# Patient Record
Sex: FEMALE | Race: BLACK OR AFRICAN AMERICAN | Employment: UNEMPLOYED | URBAN - METROPOLITAN AREA
[De-identification: names, ages, dates, MRNs, and addresses within clinical notes are randomized per-mention and may not be internally consistent; named-entity substitution may affect disease eponyms.]

---

## 2017-04-12 ENCOUNTER — HOSPITAL ENCOUNTER (EMERGENCY)
Age: 52
Discharge: HOME | End: 2017-04-12
Attending: EMERGENCY MEDICINE
Payer: SELF-PAY

## 2017-04-12 ENCOUNTER — APPOINTMENT (OUTPATIENT)
Dept: ULTRASOUND IMAGING | Age: 52
End: 2017-04-12
Attending: PHYSICIAN ASSISTANT
Payer: SELF-PAY

## 2017-04-12 VITALS
DIASTOLIC BLOOD PRESSURE: 95 MMHG | HEART RATE: 83 BPM | WEIGHT: 126 LBS | BODY MASS INDEX: 21.51 KG/M2 | RESPIRATION RATE: 20 BRPM | SYSTOLIC BLOOD PRESSURE: 120 MMHG | HEIGHT: 64 IN | OXYGEN SATURATION: 100 % | TEMPERATURE: 98.1 F

## 2017-04-12 DIAGNOSIS — N83.202 LEFT OVARIAN CYST: ICD-10-CM

## 2017-04-12 DIAGNOSIS — N39.0 URINARY TRACT INFECTION, ACUTE: Primary | ICD-10-CM

## 2017-04-12 LAB
APPEARANCE UR: ABNORMAL
BACTERIA URNS QL MICRO: ABNORMAL /HPF
BILIRUB UR QL: NEGATIVE
COLOR UR: YELLOW
EPITH CASTS URNS QL MICRO: ABNORMAL /LPF (ref 0–5)
GLUCOSE UR STRIP.AUTO-MCNC: NEGATIVE MG/DL
HCG UR QL: NEGATIVE
HGB UR QL STRIP: NEGATIVE
KETONES UR QL STRIP.AUTO: NEGATIVE MG/DL
LEUKOCYTE ESTERASE UR QL STRIP.AUTO: ABNORMAL
MUCOUS THREADS URNS QL MICRO: ABNORMAL /LPF
NITRITE UR QL STRIP.AUTO: NEGATIVE
PH UR STRIP: 6.5 [PH] (ref 5–8)
PROT UR STRIP-MCNC: NEGATIVE MG/DL
RBC #/AREA URNS HPF: ABNORMAL /HPF (ref 0–5)
SP GR UR REFRACTOMETRY: 1.03 (ref 1–1.03)
UROBILINOGEN UR QL STRIP.AUTO: 1 EU/DL (ref 0.2–1)
WBC URNS QL MICRO: ABNORMAL /HPF (ref 0–4)

## 2017-04-12 PROCEDURE — 76830 TRANSVAGINAL US NON-OB: CPT

## 2017-04-12 PROCEDURE — 81025 URINE PREGNANCY TEST: CPT | Performed by: EMERGENCY MEDICINE

## 2017-04-12 PROCEDURE — 99283 EMERGENCY DEPT VISIT LOW MDM: CPT

## 2017-04-12 PROCEDURE — 81001 URINALYSIS AUTO W/SCOPE: CPT | Performed by: EMERGENCY MEDICINE

## 2017-04-12 RX ORDER — NAPROXEN 375 MG/1
375 TABLET ORAL 2 TIMES DAILY WITH MEALS
Qty: 20 TAB | Refills: 0 | Status: SHIPPED | OUTPATIENT
Start: 2017-04-12 | End: 2017-10-26

## 2017-04-12 RX ORDER — CEPHALEXIN 500 MG/1
500 CAPSULE ORAL 2 TIMES DAILY
Qty: 14 CAP | Refills: 0 | Status: SHIPPED | OUTPATIENT
Start: 2017-04-12 | End: 2017-04-19

## 2017-04-12 NOTE — DISCHARGE INSTRUCTIONS
Urinary Tract Infection in Women: Care Instructions  Your Care Instructions    A urinary tract infection, or UTI, is a general term for an infection anywhere between the kidneys and the urethra (where urine comes out). Most UTIs are bladder infections. They often cause pain or burning when you urinate. UTIs are caused by bacteria and can be cured with antibiotics. Be sure to complete your treatment so that the infection goes away. Follow-up care is a key part of your treatment and safety. Be sure to make and go to all appointments, and call your doctor if you are having problems. It's also a good idea to know your test results and keep a list of the medicines you take. How can you care for yourself at home? · Take your antibiotics as directed. Do not stop taking them just because you feel better. You need to take the full course of antibiotics. · Drink extra water and other fluids for the next day or two. This may help wash out the bacteria that are causing the infection. (If you have kidney, heart, or liver disease and have to limit fluids, talk with your doctor before you increase your fluid intake.)  · Avoid drinks that are carbonated or have caffeine. They can irritate the bladder. · Urinate often. Try to empty your bladder each time. · To relieve pain, take a hot bath or lay a heating pad set on low over your lower belly or genital area. Never go to sleep with a heating pad in place. To prevent UTIs  · Drink plenty of water each day. This helps you urinate often, which clears bacteria from your system. (If you have kidney, heart, or liver disease and have to limit fluids, talk with your doctor before you increase your fluid intake.)  · Urinate when you need to. · Urinate right after you have sex. · Change sanitary pads often. · Avoid douches, bubble baths, feminine hygiene sprays, and other feminine hygiene products that have deodorants.   · After going to the bathroom, wipe from front to back.  When should you call for help? Call your doctor now or seek immediate medical care if:  · Symptoms such as fever, chills, nausea, or vomiting get worse or appear for the first time. · You have new pain in your back just below your rib cage. This is called flank pain. · There is new blood or pus in your urine. · You have any problems with your antibiotic medicine. Watch closely for changes in your health, and be sure to contact your doctor if:  · You are not getting better after taking an antibiotic for 2 days. · Your symptoms go away but then come back. Where can you learn more? Go to http://allie-francine.info/. Enter O982 in the search box to learn more about \"Urinary Tract Infection in Women: Care Instructions. \"  Current as of: November 28, 2016  Content Version: 11.2  © 7887-2362 [employer]. Care instructions adapted under license by [employer] (which disclaims liability or warranty for this information). If you have questions about a medical condition or this instruction, always ask your healthcare professional. [address] any warranty or liability for your use of this information.

## 2017-04-12 NOTE — ED PROVIDER NOTES
HPI Comments: 49yoF to ED c/o pelvic pain. Pt states pain similar to previous pain when she was dx with ovarian cyst. Reports some urinary frequency. She is still having normal periods. Denies dysuria, hematuria, vaginal discharge, fever, chills, or any other symptoms or concerns. Patient is a 46 y.o. female presenting with pelvic pain. The history is provided by the patient. Pelvic Pain          No past medical history on file. No past surgical history on file. No family history on file. Social History     Social History    Marital status: SINGLE     Spouse name: N/A    Number of children: N/A    Years of education: N/A     Occupational History    Not on file. Social History Main Topics    Smoking status: Never Smoker    Smokeless tobacco: Not on file    Alcohol use Yes      Comment: occational beer    Drug use: No    Sexual activity: Not on file     Other Topics Concern    Not on file     Social History Narrative         ALLERGIES: Review of patient's allergies indicates no known allergies. Review of Systems   Genitourinary: Positive for pelvic pain. All other systems reviewed and are negative. Vitals:    04/12/17 1112   BP: (!) 120/95   Pulse: 83   Resp: 20   Temp: 98.1 °F (36.7 °C)   SpO2: 100%   Weight: 57.2 kg (126 lb)   Height: 5' 4\" (1.626 m)            Physical Exam   Constitutional: She appears well-developed and well-nourished. No distress. HENT:   Head: Normocephalic and atraumatic. Eyes: Conjunctivae are normal.   Neck: Normal range of motion. Neck supple. Cardiovascular: Normal rate and regular rhythm. Pulmonary/Chest: Effort normal and breath sounds normal.   Abdominal: Soft. She exhibits no distension. There is no tenderness. Musculoskeletal: Normal range of motion. Neurological: She is alert. Skin: Skin is warm and dry. She is not diaphoretic. Psychiatric: She has a normal mood and affect.         MDM  Number of Diagnoses or Management Options  Left ovarian cyst:   Urinary tract infection, acute:   Diagnosis management comments: Pt presents with pelvic pain which she states is similar to pain when she was dx with ovarian cyst. She appears non toxic, VSS, afebrile. US with left ovarian cyst, no torsion. UTI noted on UA, will treat. Advised gyn f/u.  ARCHIE High 1:01 PM         Amount and/or Complexity of Data Reviewed  Clinical lab tests: reviewed and ordered  Tests in the radiology section of CPT®: ordered and reviewed    Risk of Complications, Morbidity, and/or Mortality  Presenting problems: moderate  Diagnostic procedures: moderate  Management options: low    Patient Progress  Patient progress: improved    ED Course       Procedures

## 2017-04-12 NOTE — ED NOTES
I have reviewed discharge instructions with the patient. The patient verbalized understanding. Pt given both verbal and written D/C information and 2 Rxs. Pt understands to F/U in ED for any new or worsening symptoms. Pt leaving ED now in stable condition, ambulatory, w/ no further questions or concerns at this time.

## 2017-04-12 NOTE — ED TRIAGE NOTES
Low pelvic pain returned from previous visit, did not follow up as directed.  \"they told me it was my ovaries\"

## 2017-10-24 ENCOUNTER — HOSPITAL ENCOUNTER (INPATIENT)
Age: 52
LOS: 2 days | Discharge: HOME | DRG: 282 | End: 2017-10-26
Attending: EMERGENCY MEDICINE | Admitting: HOSPITALIST
Payer: SELF-PAY

## 2017-10-24 ENCOUNTER — APPOINTMENT (OUTPATIENT)
Dept: GENERAL RADIOLOGY | Age: 52
DRG: 282 | End: 2017-10-24
Attending: EMERGENCY MEDICINE
Payer: SELF-PAY

## 2017-10-24 DIAGNOSIS — F14.10 COCAINE ABUSE (HCC): ICD-10-CM

## 2017-10-24 DIAGNOSIS — F10.10 ALCOHOL ABUSE: ICD-10-CM

## 2017-10-24 DIAGNOSIS — R77.8 ELEVATED TROPONIN: ICD-10-CM

## 2017-10-24 DIAGNOSIS — R55 SYNCOPE AND COLLAPSE: Primary | ICD-10-CM

## 2017-10-24 DIAGNOSIS — R11.0 NAUSEA WITHOUT VOMITING: ICD-10-CM

## 2017-10-24 DIAGNOSIS — R94.31 PROLONGED Q-T INTERVAL ON ECG: ICD-10-CM

## 2017-10-24 PROBLEM — I21.4 NSTEMI (NON-ST ELEVATED MYOCARDIAL INFARCTION) (HCC): Status: ACTIVE | Noted: 2017-10-24

## 2017-10-24 LAB
ALBUMIN SERPL-MCNC: 4.1 G/DL (ref 3.4–5)
ALBUMIN/GLOB SERPL: 0.9 {RATIO} (ref 0.8–1.7)
ALP SERPL-CCNC: 78 U/L (ref 45–117)
ALT SERPL-CCNC: 35 U/L (ref 13–56)
AMPHET UR QL SCN: NEGATIVE
ANION GAP SERPL CALC-SCNC: 12 MMOL/L (ref 3–18)
AST SERPL-CCNC: 57 U/L (ref 15–37)
BARBITURATES UR QL SCN: NEGATIVE
BASOPHILS # BLD: 0 K/UL (ref 0–0.1)
BASOPHILS NFR BLD: 0 % (ref 0–2)
BENZODIAZ UR QL: NEGATIVE
BILIRUB SERPL-MCNC: 0.4 MG/DL (ref 0.2–1)
BUN SERPL-MCNC: 16 MG/DL (ref 7–18)
BUN/CREAT SERPL: 24 (ref 12–20)
CALCIUM SERPL-MCNC: 8.2 MG/DL (ref 8.5–10.1)
CANNABINOIDS UR QL SCN: NEGATIVE
CHLORIDE SERPL-SCNC: 108 MMOL/L (ref 100–108)
CK MB CFR SERPL CALC: 1.9 % (ref 0–4)
CK MB SERPL-MCNC: 3.4 NG/ML (ref 5–25)
CK SERPL-CCNC: 177 U/L (ref 26–192)
CO2 SERPL-SCNC: 20 MMOL/L (ref 21–32)
COCAINE UR QL SCN: POSITIVE
CREAT SERPL-MCNC: 0.67 MG/DL (ref 0.6–1.3)
DIFFERENTIAL METHOD BLD: ABNORMAL
EOSINOPHIL # BLD: 0 K/UL (ref 0–0.4)
EOSINOPHIL NFR BLD: 0 % (ref 0–5)
ERYTHROCYTE [DISTWIDTH] IN BLOOD BY AUTOMATED COUNT: 14.1 % (ref 11.6–14.5)
ETHANOL SERPL-MCNC: 91 MG/DL (ref 0–3)
GLOBULIN SER CALC-MCNC: 4.4 G/DL (ref 2–4)
GLUCOSE SERPL-MCNC: 70 MG/DL (ref 74–99)
HCG UR QL: NEGATIVE
HCT VFR BLD AUTO: 40.1 % (ref 35–45)
HDSCOM,HDSCOM: ABNORMAL
HGB BLD-MCNC: 13.6 G/DL (ref 12–16)
LYMPHOCYTES # BLD: 0.5 K/UL (ref 0.9–3.6)
LYMPHOCYTES NFR BLD: 4 % (ref 21–52)
MCH RBC QN AUTO: 35.3 PG (ref 24–34)
MCHC RBC AUTO-ENTMCNC: 33.9 G/DL (ref 31–37)
MCV RBC AUTO: 104.2 FL (ref 74–97)
METHADONE UR QL: NEGATIVE
MONOCYTES # BLD: 0.1 K/UL (ref 0.05–1.2)
MONOCYTES NFR BLD: 1 % (ref 3–10)
NEUTS SEG # BLD: 9.6 K/UL (ref 1.8–8)
NEUTS SEG NFR BLD: 95 % (ref 40–73)
OPIATES UR QL: NEGATIVE
PCP UR QL: NEGATIVE
PLATELET # BLD AUTO: 265 K/UL (ref 135–420)
PMV BLD AUTO: 9.5 FL (ref 9.2–11.8)
POTASSIUM SERPL-SCNC: 3.8 MMOL/L (ref 3.5–5.5)
PROT SERPL-MCNC: 8.5 G/DL (ref 6.4–8.2)
RBC # BLD AUTO: 3.85 M/UL (ref 4.2–5.3)
SODIUM SERPL-SCNC: 140 MMOL/L (ref 136–145)
TROPONIN I BLD-MCNC: 0.09 NG/ML (ref 0–0.08)
TROPONIN I SERPL-MCNC: 0.24 NG/ML (ref 0–0.04)
WBC # BLD AUTO: 10.2 K/UL (ref 4.6–13.2)

## 2017-10-24 PROCEDURE — 80307 DRUG TEST PRSMV CHEM ANLYZR: CPT | Performed by: EMERGENCY MEDICINE

## 2017-10-24 PROCEDURE — 84484 ASSAY OF TROPONIN QUANT: CPT

## 2017-10-24 PROCEDURE — 93005 ELECTROCARDIOGRAM TRACING: CPT

## 2017-10-24 PROCEDURE — 81025 URINE PREGNANCY TEST: CPT | Performed by: EMERGENCY MEDICINE

## 2017-10-24 PROCEDURE — 85025 COMPLETE CBC W/AUTO DIFF WBC: CPT | Performed by: EMERGENCY MEDICINE

## 2017-10-24 PROCEDURE — 74011250636 HC RX REV CODE- 250/636: Performed by: EMERGENCY MEDICINE

## 2017-10-24 PROCEDURE — 99218 HC RM OBSERVATION: CPT

## 2017-10-24 PROCEDURE — 71010 XR CHEST PORT: CPT

## 2017-10-24 PROCEDURE — 80053 COMPREHEN METABOLIC PANEL: CPT | Performed by: EMERGENCY MEDICINE

## 2017-10-24 PROCEDURE — 99285 EMERGENCY DEPT VISIT HI MDM: CPT

## 2017-10-24 PROCEDURE — 82550 ASSAY OF CK (CPK): CPT | Performed by: EMERGENCY MEDICINE

## 2017-10-24 PROCEDURE — 96360 HYDRATION IV INFUSION INIT: CPT

## 2017-10-24 PROCEDURE — 65660000000 HC RM CCU STEPDOWN

## 2017-10-24 RX ORDER — ENOXAPARIN SODIUM 100 MG/ML
1 INJECTION SUBCUTANEOUS
Status: COMPLETED | OUTPATIENT
Start: 2017-10-24 | End: 2017-10-25

## 2017-10-24 RX ADMIN — SODIUM CHLORIDE 1000 ML: 9 INJECTION, SOLUTION INTRAVENOUS at 22:14

## 2017-10-24 NOTE — ED PROVIDER NOTES
HPI Comments: Justino Xavier   Is a 52-year-old female with no significant past medical history presents with syncope. The patient sees been drinking heavily today and resting on the couch. The significant other she has been using heroin. She proceeded to sit up from the couch and then pass out. The  then called 46. She denies any chest pain shortness of breath or abdominal pain. She does note some nausea. No sob. No other aggravating or alleviating factors. No other associated symptoms. Patient is a 46 y.o. female presenting with syncope. Syncope          No past medical history on file. No past surgical history on file. No family history on file. Social History     Social History    Marital status: SINGLE     Spouse name: N/A    Number of children: N/A    Years of education: N/A     Occupational History    Not on file. Social History Main Topics    Smoking status: Never Smoker    Smokeless tobacco: Not on file    Alcohol use Yes      Comment: occational beer    Drug use: No    Sexual activity: Not on file     Other Topics Concern    Not on file     Social History Narrative         ALLERGIES: Review of patient's allergies indicates no known allergies. Review of Systems   Cardiovascular: Positive for syncope. All other systems reviewed and are negative. There were no vitals filed for this visit. Physical Exam   Constitutional: She is oriented to person, place, and time. She appears well-developed. HENT:   Head: Normocephalic and atraumatic. Eyes: EOM are normal. Pupils are equal, round, and reactive to light. Neck: Normal range of motion. Neck supple. Cardiovascular: Normal rate, regular rhythm and normal heart sounds. Exam reveals no friction rub. No murmur heard. Pulmonary/Chest: Effort normal and breath sounds normal. No respiratory distress. She has no wheezes. Abdominal: Soft. She exhibits no distension. There is no tenderness.  There is no rebound and no guarding. Musculoskeletal: Normal range of motion. Neurological: She is alert and oriented to person, place, and time. Skin: Skin is warm and dry. Psychiatric: She has a normal mood and affect. Her behavior is normal. Thought content normal.        MDM  Number of Diagnoses or Management Options  Diagnosis management comments: 1. Syncope: likley orthostatic based on hx. No concerning sx. EKG: sinus at 51; nl axis. Nl int. No azael/d no hypertrophy. No old. .now persistnat nausea; check repeat ekg poc trop. If both neg, d/c.   cxr napd. Repeat EKG nsr at 82; nl axis. Nl int. No azael/d no hypertrophy.      ED Course       Procedures

## 2017-10-24 NOTE — Clinical Note
Patient Class[de-identified] Observation [004] Type of Bed: Telemetry [19] Reason for Observation: syncope, unresponsive, elevated troponin, prolonged QTc, cocaine abuse Admitting Diagnosis: Syncope [287170] Admitting Diagnosis: Elevated troponin [3807624] Admitting Diagnosis: QT prolongation [7858761] Admitting Diagnosis: Cocaine abuse [648225] Admitting Diagnosis: Alcohol abuse [357804] Admitting Physician: Dottie Dohrety [[id number]] Attending Physician: Dottie Doherty [[id number]]

## 2017-10-24 NOTE — IP AVS SNAPSHOT
Summary of Care Report The Summary of Care report has been created to help improve care coordination. Users with access to [facility] or [employer] (Web-based application) may access additional patient information including the Discharge Summary. If you are not currently a [facility] user and need more information, please call the number listed below in the [place] [address] section and ask to be connected with Medical Records. Facility Information Name Address Phone [address] [facility]  [address] [facility] [address] [phone] Patient Information Patient Name Sex ARIANNE Valdes ([id number]) Female 1965 Discharge Information Admitting Provider Service Area Unit Joaquin Headley MD / [address] 2s Telemetry / [phone] Discharge Provider Discharge Date/Time Discharge Disposition Destination (none) 10/26/2017 (Pending) [facility] (none) Patient Language Language ENGLISH [13] Hospital Problems as of 10/26/2017  Never Reviewed Class Noted - Resolved Last Modified POA Active Problems Alcohol abuse  10/24/2017 - Present 10/24/2017 by Sacha Sloan MD Unknown Entered by Sacha Sloan MD  
  Syncope  10/24/2017 - Present 10/24/2017 by Sacha Sloan MD Unknown Entered by Sacha Sloan MD  
  Cocaine abuse  10/24/2017 - Present 10/24/2017 by Sacha Sloan MD Unknown Entered by Sacha Sloan MD  
  Elevated troponin  10/24/2017 - Present 10/24/2017 by Sacha Sloan MD Unknown Entered by Sacha Sloan MD  
  QT prolongation  10/24/2017 - Present 10/24/2017 by Sacha Sloan MD Unknown Entered by Sacha Sloan MD  
  NSTEMI (non-ST elevated myocardial infarction) ([facility]  10/24/2017 - Present 10/24/2017 by Joaquin Headley MD Unknown   Entered by Joaquin Headley MD  
  
 You are allergic to the following No active allergies Current Discharge Medication List  
  
START taking these medications Dose & Instructions Dispensing Information Comments  
 amLODIPine 5 mg tablet Commonly known as:  Mabel Rivera Start taking on:  10/27/2017 Dose:  2.5 mg Take 0.5 Tabs by mouth daily. Indications: hypertension Quantity:  30 Tab Refills:  0  
   
 aspirin delayed-release 81 mg tablet Start taking on:  10/27/2017 Dose:  81 mg Take 1 Tab by mouth daily. Quantity:  30 Tab Refills:  0  
   
 atorvastatin 20 mg tablet Commonly known as:  LIPITOR Dose:  20 mg Take 1 Tab by mouth nightly. Quantity:  30 Tab Refills:  0  
   
 * docusate sodium 100 mg capsule Commonly known as:  Henreitta Pomeroy Dose:  100 mg Take 1 Cap by mouth two (2) times a day for 90 days. Quantity:  60 Cap Refills:  2  
   
 * docusate sodium 100 mg capsule Commonly known as:  Henreitta Pomeroy Take 1 capsule twice daily Quantity:  60 Cap Refills:  0  
   
 folic acid 1 mg tablet Commonly known as:  Malcolm Start taking on:  10/27/2017 Dose:  1 mg Take 1 Tab by mouth daily. Quantity:  30 Tab Refills:  0  
   
 potassium, sodium phosphates 280-160-250 mg packet Commonly known as:  NEUTRA-PHOS Dose:  1 Packet Take 1 Packet by mouth two (2) times a day for 5 doses. Quantity:  5 Packet Refills:  0  
   
 therapeutic multivitamin tablet Commonly known as:  [facility] Start taking on:  10/27/2017 Dose:  1 Tab Take 1 Tab by mouth daily. Quantity:  30 Tab Refills:  0  
   
 thiamine 100 mg tablet Commonly known as:  B-1 Start taking on:  10/27/2017 Dose:  100 mg Take 1 Tab by mouth daily. Quantity:  30 Tab Refills:  0  
   
 * Notice: This list has 2 medication(s) that are the same as other medications prescribed for you. Read the directions carefully, and ask your doctor or other care provider to review them with you. CONTINUE these medications which have NOT CHANGED Dose & Instructions Dispensing Information Comments  
 promethazine 25 mg tablet Commonly known as:  PHENERGAN Dose:  25 mg Take 1 Tab by mouth every six (6) hours as needed. Indications: Nausea/Vomiting Quantity:  12 Tab Refills:  0 STOP taking these medications Comments  
 naproxen 375 mg tablet Commonly known as:  NAPROSYN Current Immunizations Name Date Influenza Vaccine (Quad) PF 10/25/2017 TDAP Vaccine 9/29/2012 Follow-up Information Follow up With Details Comments Contact Info Caro Perez [facility] [id number] 
[phone] None   None (395) Patient stated that they have no PCP Discharge Instructions Acute Alcohol Intoxication: Care Instructions Your Care Instructions You have had treatment to help your body rid itself of alcohol. Too much alcohol upsets the body's fluid balance. Your doctor may have given you fluids and vitamins. For some people, drinking too much alcohol is a one-time event. For others, it is an ongoing problem. In either case, it is serious. It can be life-threatening. Follow-up care is a key part of your treatment and safety. Be sure to make and go to all appointments, and call your doctor if you are having problems. It's also a good idea to know your test results and keep a list of the medicines you take. How can you care for yourself at home? · Do not drink and drive. · Be safe with medicines. Take your medicines exactly as prescribed. Call your doctor if you think you are having a problem with your medicine. · Your doctor may have prescribed disulfiram (Antabuse). Do not drink any alcohol while you are taking this medicine. You may have severe or even life-threatening side effects from even small amounts of alcohol. · If you were given medicine to prevent nausea, be sure to take it exactly as prescribed. · Before you take any medicine, tell your doctor if: 
¨ You have had a bad reaction to any medicines in the past. 
¨ You are taking other medicines, including over-the-counter ones, or have other health problems. ¨ You are or could be pregnant. · Be prepared to have some symptoms of withdrawal in the next few days. · Drink plenty of liquids in the next few days. · Seek help if you need it to stop drinking. Getting counseling and joining a support group can help you stay sober. Try a support group such as Alcoholics Anonymous. · Avoid alcohol when you take medicines. It can react with many medicines and cause serious problems. When should you call for help? Call 911 anytime you think you may need emergency care. For example, call if: 
? · You feel confused and are seeing things that are not there. ? · You are thinking about killing yourself or hurting others. ? · You have a seizure. ? · You vomit blood or what looks like coffee grounds. ?Call your doctor now or seek immediate medical care if: 
? · You have trembling, restlessness, sweating, and other withdrawal symptoms that are new or that get worse. ? · Your withdrawal symptoms come back after not bothering you for days or weeks. ? · You can't stop vomiting. ? Watch closely for changes in your health, and be sure to contact your doctor if: 
? · You need help to stop drinking. Where can you learn more? Go to http://allie-francine.info/. Enter T102 in the search box to learn more about \"Acute Alcohol Intoxication: Care Instructions. \" Current as of: November 3, 2016 Content Version: 11.4 © 5829-6278 [employer]. Care instructions adapted under license by [employer] (which disclaims liability or warranty for this information).  If you have questions about a medical condition or this instruction, always ask your healthcare professional. [place] [address] any warranty or liability for your use of this information. DISCHARGE SUMMARY from Nurse PATIENT INSTRUCTIONS: 
 
 
F-face looks uneven A-arms unable to move or move unevenly S-speech slurred or non-existent T-time-call 911 as soon as signs and symptoms begin-DO NOT go Back to bed or wait to see if you get better-TIME IS BRAIN. Warning Signs of HEART ATTACK Call 911 if you have these symptoms: 
? Chest discomfort. Most heart attacks involve discomfort in the center of the chest that lasts more than a few minutes, or that goes away and comes back. It can feel like uncomfortable pressure, squeezing, fullness, or pain. ? Discomfort in other areas of the upper body. Symptoms can include pain or discomfort in one or both arms, the back, neck, jaw, or stomach. ? Shortness of breath with or without chest discomfort. ? Other signs may include breaking out in a cold sweat, nausea, or lightheadedness. Don't wait more than five minutes to call [address]! Fast action can save your life. Calling 911 is almost always the fastest way to get lifesaving treatment. Emergency Medical Services staff can begin treatment when they arrive  up to an hour sooner than if someone gets to the hospital by car. The discharge information has been reviewed with the patient. The patient verbalized understanding. Discharge medications reviewed with the patient and appropriate educational materials and side effects teaching were provided. ___________________________________________________________________________________________________________________________________ Chart Review Routing History No Routing History on File

## 2017-10-24 NOTE — ED NOTES
Bedside and Verbal shift change report given to Rena Brice (oncoming nurse) by Abdon Garcia RN (offgoing nurse). Report included the following information SBAR, Kardex, Intake/Output, MAR and Recent Results.

## 2017-10-24 NOTE — IP AVS SNAPSHOT
Euell Idol 
 
 
 [address] [facility] [address] [place] [address] Patient: Juan Hernandez MRN: [id number] :1965 My Medications STOP taking these medications   
 naproxen 375 mg tablet Commonly known as:  NAPROSYN  
   
  
  
TAKE these medications as instructed Instructions Each Dose to Equal  
 Morning Noon Evening Bedtime  
 amLODIPine 5 mg tablet Commonly known as:  Maxim Downing Start taking on:  10/27/2017 Your last dose was: Your next dose is: Take 0.5 Tabs by mouth daily. Indications: hypertension 2.5 mg  
    
   
   
   
  
 aspirin delayed-release 81 mg tablet Start taking on:  10/27/2017 Your last dose was: Your next dose is: Take 1 Tab by mouth daily. 81 mg  
    
   
   
   
  
 atorvastatin 20 mg tablet Commonly known as:  LIPITOR Your last dose was: Your next dose is: Take 1 Tab by mouth nightly. 20 mg  
    
   
   
   
  
 * docusate sodium 100 mg capsule Commonly known as:  Ana Lilia Agrawal Your last dose was: Your next dose is: Take 1 Cap by mouth two (2) times a day for 90 days. 100 mg  
    
   
   
   
  
 * docusate sodium 100 mg capsule Commonly known as:  Ana Lilia Agrawal Your last dose was: Your next dose is: Take 1 capsule twice daily  
     
   
   
   
  
 folic acid 1 mg tablet Commonly known as:  Malcolm Start taking on:  10/27/2017 Your last dose was: Your next dose is: Take 1 Tab by mouth daily. 1 mg  
    
   
   
   
  
 potassium, sodium phosphates 280-160-250 mg packet Commonly known as:  NEUTRA-PHOS Your last dose was: Your next dose is: Take 1 Packet by mouth two (2) times a day for 5 doses. 1 Packet  
    
   
   
   
  
 promethazine 25 mg tablet Commonly known as:  PHENERGAN Your last dose was: Your next dose is: Take 1 Tab by mouth every six (6) hours as needed. Indications: Nausea/Vomiting 25 mg  
    
   
   
   
  
 therapeutic multivitamin tablet Commonly known as:  [facility] Start taking on:  10/27/2017 Your last dose was: Your next dose is: Take 1 Tab by mouth daily. 1 Tab  
    
   
   
   
  
 thiamine 100 mg tablet Commonly known as:  B-1 Start taking on:  10/27/2017 Your last dose was: Your next dose is: Take 1 Tab by mouth daily. 100 mg * Notice: This list has 2 medication(s) that are the same as other medications prescribed for you. Read the directions carefully, and ask your doctor or other care provider to review them with you. Where to Get Your Medications Information on where to get these meds will be given to you by the nurse or doctor. ! Ask your nurse or doctor about these medications  
  amLODIPine 5 mg tablet  
 aspirin delayed-release 81 mg tablet  
 atorvastatin 20 mg tablet  
 docusate sodium 100 mg capsule  
 docusate sodium 100 mg capsule  
 folic acid 1 mg tablet  
 potassium, sodium phosphates 280-160-250 mg packet  
 therapeutic multivitamin tablet  
 thiamine 100 mg tablet

## 2017-10-24 NOTE — ED TRIAGE NOTES
BIBA, called by friend. Pt has been drinking and using drugs. Pt had been sleeping off \"gigh\" pt was sitting on side of couch and had syncopal episode. Pt denies medical hx. Only admitys to ETOH, pt is diaphoretic, pupils arent pinpoint. BS 80. Pt wasa afib . Attempted IV access. None at this time. No meds given. NKDA.

## 2017-10-24 NOTE — IP AVS SNAPSHOT
[address] Patient: Narinder Cowan MRN: [id number] :[id number] About your hospitalization You were admitted on:  2017 You last received care in the:  [address] [place] [address] You were discharged on:  2017 Why you were hospitalized Your primary diagnosis was:  Not on File Your diagnoses also included:  Alcohol Abuse, Syncope, Cocaine Abuse, Elevated Troponin, Qt Prolongation, Nstemi (Non-St Elevated Myocardial Infarction) (Hcc) Things You Need To Do (next 8 weeks) [facility] Phone:  [phone] Where:  [facility][note 3], [facility] [address] Follow up with None Where:  None (395) Patient stated that they have no PCP Discharge Orders None A check ladarius indicates which time of day the medication should be taken. My Medications STOP taking these medications   
 naproxen 375 mg tablet Commonly known as:  NAPROSYN  
   
  
  
TAKE these medications as instructed Instructions Each Dose to Equal  
 Morning Noon Evening Bedtime  
 amLODIPine 5 mg tablet Commonly known as:  Mabel Darting Start taking on:  10/27/2017 Your last dose was: Your next dose is: Take 0.5 Tabs by mouth daily. Indications: hypertension 2.5 mg  
    
   
   
   
  
 aspirin delayed-release 81 mg tablet Start taking on:  10/27/2017 Your last dose was: Your next dose is: Take 1 Tab by mouth daily. 81 mg  
    
   
   
   
  
 atorvastatin 20 mg tablet Commonly known as:  LIPITOR Your last dose was: Your next dose is: Take 1 Tab by mouth nightly. 20 mg  
    
   
   
   
  
 * docusate sodium 100 mg capsule Commonly known as:  Elfego Garcia Your last dose was: Your next dose is: Take 1 Cap by mouth two (2) times a day for 90 days. 100 mg  
    
   
   
   
  
 * docusate sodium 100 mg capsule Commonly known as:  oNemy Shore Your last dose was: Your next dose is: Take 1 capsule twice daily  
     
   
   
   
  
 folic acid 1 mg tablet Commonly known as:  [employer] Start taking on:  10/27/2017 Your last dose was: Your next dose is: Take 1 Tab by mouth daily. 1 mg  
    
   
   
   
  
 potassium, sodium phosphates 280-160-250 mg packet Commonly known as:  NEUTRA-PHOS Your last dose was: Your next dose is: Take 1 Packet by mouth two (2) times a day for 5 doses. 1 Packet  
    
   
   
   
  
 promethazine 25 mg tablet Commonly known as:  PHENERGAN Your last dose was: Your next dose is: Take 1 Tab by mouth every six (6) hours as needed. Indications: Nausea/Vomiting 25 mg  
    
   
   
   
  
 therapeutic multivitamin tablet Commonly known as:  [facility] Start taking on:  10/27/2017 Your last dose was: Your next dose is: Take 1 Tab by mouth daily. 1 Tab  
    
   
   
   
  
 thiamine 100 mg tablet Commonly known as:  B-1 Start taking on:  10/27/2017 Your last dose was: Your next dose is: Take 1 Tab by mouth daily. 100 mg * Notice: This list has 2 medication(s) that are the same as other medications prescribed for you. Read the directions carefully, and ask your doctor or other care provider to review them with you. Where to Get Your Medications Information on where to get these meds will be given to you by the nurse or doctor. ! Ask your nurse or doctor about these medications  
  amLODIPine 5 mg tablet  
 aspirin delayed-release 81 mg tablet  
 atorvastatin 20 mg tablet  
 docusate sodium 100 mg capsule  
 docusate sodium 100 mg capsule folic acid 1 mg tablet  
 potassium, sodium phosphates 280-160-250 mg packet  
 therapeutic multivitamin tablet  
 thiamine 100 mg tablet Discharge Instructions Acute Alcohol Intoxication: Care Instructions Your Care Instructions You have had treatment to help your body rid itself of alcohol. Too much alcohol upsets the body's fluid balance. Your doctor may have given you fluids and vitamins. For some people, drinking too much alcohol is a one-time event. For others, it is an ongoing problem. In either case, it is serious. It can be life-threatening. Follow-up care is a key part of your treatment and safety. Be sure to make and go to all appointments, and call your doctor if you are having problems. It's also a good idea to know your test results and keep a list of the medicines you take. How can you care for yourself at home? · Do not drink and drive. · Be safe with medicines. Take your medicines exactly as prescribed. Call your doctor if you think you are having a problem with your medicine. · Your doctor may have prescribed disulfiram (Antabuse). Do not drink any alcohol while you are taking this medicine. You may have severe or even life-threatening side effects from even small amounts of alcohol. · If you were given medicine to prevent nausea, be sure to take it exactly as prescribed. · Before you take any medicine, tell your doctor if: 
¨ You have had a bad reaction to any medicines in the past. 
¨ You are taking other medicines, including over-the-counter ones, or have other health problems. ¨ You are or could be pregnant. · Be prepared to have some symptoms of withdrawal in the next few days. · Drink plenty of liquids in the next few days. · Seek help if you need it to stop drinking. Getting counseling and joining a support group can help you stay sober. Try a support group such as Alcoholics Anonymous. · Avoid alcohol when you take medicines. It can react with many medicines and cause serious problems. When should you call for help? Call 911 anytime you think you may need emergency care. For example, call if: 
? · You feel confused and are seeing things that are not there. ? · You are thinking about killing yourself or hurting others. ? · You have a seizure. ? · You vomit blood or what looks like coffee grounds. ?Call your doctor now or seek immediate medical care if: 
? · You have trembling, restlessness, sweating, and other withdrawal symptoms that are new or that get worse. ? · Your withdrawal symptoms come back after not bothering you for days or weeks. ? · You can't stop vomiting. ? Watch closely for changes in your health, and be sure to contact your doctor if: 
? · You need help to stop drinking. Where can you learn more? Go to http://allieNovusEdgefrancine.info/. Enter T102 in the search box to learn more about \"Acute Alcohol Intoxication: Care Instructions. \" Current as of: November 3, 2016 Content Version: 11.4 © 1586-1297 [employer]. Care instructions adapted under license by [employer] (which disclaims liability or warranty for this information). If you have questions about a medical condition or this instruction, always ask your healthcare professional. [address] any warranty or liability for your use of this information. DISCHARGE SUMMARY from Nurse PATIENT INSTRUCTIONS: 
 
 
F-face looks uneven A-arms unable to move or move unevenly S-speech slurred or non-existent T-time-call 911 as soon as signs and symptoms begin-DO NOT go  
 Back to bed or wait to see if you get better-TIME IS BRAIN. Warning Signs of HEART ATTACK Call 911 if you have these symptoms: 
? Chest discomfort. Most heart attacks involve discomfort in the center of the chest that lasts more than a few minutes, or that goes away and comes back. It can feel like uncomfortable pressure, squeezing, fullness, or pain. ? Discomfort in other areas of the upper body. Symptoms can include pain or discomfort in one or both arms, the back, neck, jaw, or stomach. ? Shortness of breath with or without chest discomfort. ? Other signs may include breaking out in a cold sweat, nausea, or lightheadedness. Don't wait more than five minutes to call [address]! Fast action can save your life. Calling 911 is almost always the fastest way to get lifesaving treatment. Emergency Medical Services staff can begin treatment when they arrive  up to an hour sooner than if someone gets to the hospital by car. The discharge information has been reviewed with the patient. The patient verbalized understanding. Discharge medications reviewed with the patient and appropriate educational materials and side effects teaching were provided. ___________________________________________________________________________________________________________________________________ [employer] Announcement We are excited to announce that we are making your provider's discharge notes available to you in [employer]. You will see these notes when they are completed and signed by the physician that discharged you from your recent hospital stay. If you have any questions or concerns about any information you see in [employer], please call the Health Information Department where you were seen or reach out to your Primary Care Provider for more information about your plan of care. Introducing [facility]!    
 [facility] introduces [employer] patient portal. Now you can access parts of your medical record, email your doctor's office, and request medication refills online. 1. In your internet browser, go to [email or web address] 2. Click on the First Time User? Click Here link in the Sign In box. You will see the New Member Sign Up page. 3. Enter your [employer] Access Code exactly as it appears below. You will not need to use this code after youve completed the sign-up process. If you do not sign up before the expiration date, you must request a new code. · [employer] Access Code: [email or web address] Expires: 1/24/2018  5:51 PM 
 
4. Enter the last four digits of your Social Security Number (xxxx) and Date of Birth (mm/dd/yyyy) as indicated and click Submit. You will be taken to the next sign-up page. 5. Create a [employer] ID. This will be your [employer] login ID and cannot be changed, so think of one that is secure and easy to remember. 6. Create a [employer] password. You can change your password at any time. 7. Enter your Password Reset Question and Answer. This can be used at a later time if you forget your password. 8. Enter your e-mail address. You will receive e-mail notification when new information is available in [address] 9. Click Sign Up. You can now view and download portions of your medical record. 10. Click the Download Summary menu link to download a portable copy of your medical information. If you have questions, please visit the Frequently Asked Questions section of the [employer] website. Remember, [employer] is NOT to be used for urgent needs. For medical emergencies, dial 911. Now available from your iPhone and Android! Providers Seen During Your Hospitalization Provider Specialty Primary office phone Lizeth Das MD Emergency Medicine [phone] Taylor English MD Emergency Medicine [phone] Muriel Harrison MD Internal Medicine [phone] Immunizations Administered for This Admission Name Date Influenza Vaccine (Quad) PF 10/25/2017 Your Primary Care Physician (PCP) Primary Care Physician Office Phone Office Fax NONE ** None ** ** None ** You are allergic to the following No active allergies Recent Documentation Height Weight Breastfeeding? BMI Smoking Status 1.626 m 53 kg No 20.05 kg/m2 Never Smoker Emergency Contacts Name Discharge Info Relation Home Work Mobile Olga Rodriguez DECLINED CAREGIVER [4] Parent [1] [phone] Patient Belongings The following personal items are in your possession at time of discharge: 
  Dental Appliances: None  Visual Aid: None      Home Medications: None   Jewelry: None  Clothing: None    Other Valuables: None  Personal Items Sent to Safe: no 
 
  
  
 Please provide this summary of care documentation to your next provider. Signatures-by signing, you are acknowledging that this After Visit Summary has been reviewed with you and you have received a copy. Patient Signature:  ____________________________________________________________ Date:  ____________________________________________________________  
  
Terra Banger Provider Signature:  ____________________________________________________________ Date:  ____________________________________________________________

## 2017-10-25 LAB
ALBUMIN SERPL-MCNC: 3.5 G/DL (ref 3.4–5)
ALBUMIN/GLOB SERPL: 0.9 {RATIO} (ref 0.8–1.7)
ALP SERPL-CCNC: 66 U/L (ref 45–117)
ALT SERPL-CCNC: 30 U/L (ref 13–56)
ANION GAP SERPL CALC-SCNC: 10 MMOL/L (ref 3–18)
AST SERPL-CCNC: 49 U/L (ref 15–37)
ATRIAL RATE: 187 BPM
ATRIAL RATE: 82 BPM
ATRIAL RATE: 94 BPM
BILIRUB SERPL-MCNC: 0.5 MG/DL (ref 0.2–1)
BUN SERPL-MCNC: 16 MG/DL (ref 7–18)
BUN/CREAT SERPL: 28 (ref 12–20)
CALCIUM SERPL-MCNC: 7.5 MG/DL (ref 8.5–10.1)
CALCULATED P AXIS, ECG09: 70 DEGREES
CALCULATED P AXIS, ECG09: 81 DEGREES
CALCULATED R AXIS, ECG10: 36 DEGREES
CALCULATED R AXIS, ECG10: 49 DEGREES
CALCULATED R AXIS, ECG10: 58 DEGREES
CALCULATED T AXIS, ECG11: 137 DEGREES
CALCULATED T AXIS, ECG11: 81 DEGREES
CALCULATED T AXIS, ECG11: 88 DEGREES
CHLORIDE SERPL-SCNC: 109 MMOL/L (ref 100–108)
CHOLEST SERPL-MCNC: 166 MG/DL
CO2 SERPL-SCNC: 22 MMOL/L (ref 21–32)
CREAT SERPL-MCNC: 0.58 MG/DL (ref 0.6–1.3)
DIAGNOSIS, 93000: NORMAL
ERYTHROCYTE [DISTWIDTH] IN BLOOD BY AUTOMATED COUNT: 13.3 % (ref 11.6–14.5)
GLOBULIN SER CALC-MCNC: 3.7 G/DL (ref 2–4)
GLUCOSE SERPL-MCNC: 70 MG/DL (ref 74–99)
HCT VFR BLD AUTO: 33.7 % (ref 35–45)
HDLC SERPL-MCNC: 83 MG/DL (ref 40–60)
HDLC SERPL: 2 {RATIO} (ref 0–5)
HGB BLD-MCNC: 11.3 G/DL (ref 12–16)
LDLC SERPL CALC-MCNC: 76 MG/DL (ref 0–100)
LIPID PROFILE,FLP: ABNORMAL
MCH RBC QN AUTO: 34.6 PG (ref 24–34)
MCHC RBC AUTO-ENTMCNC: 33.5 G/DL (ref 31–37)
MCV RBC AUTO: 103.1 FL (ref 74–97)
P-R INTERVAL, ECG05: 184 MS
P-R INTERVAL, ECG05: 186 MS
PLATELET # BLD AUTO: 245 K/UL (ref 135–420)
PMV BLD AUTO: 9.4 FL (ref 9.2–11.8)
POTASSIUM SERPL-SCNC: 3.8 MMOL/L (ref 3.5–5.5)
PROT SERPL-MCNC: 7.2 G/DL (ref 6.4–8.2)
Q-T INTERVAL, ECG07: 384 MS
Q-T INTERVAL, ECG07: 466 MS
Q-T INTERVAL, ECG07: 534 MS
QRS DURATION, ECG06: 102 MS
QRS DURATION, ECG06: 88 MS
QRS DURATION, ECG06: 98 MS
QTC CALCULATION (BEZET), ECG08: 480 MS
QTC CALCULATION (BEZET), ECG08: 492 MS
QTC CALCULATION (BEZET), ECG08: 544 MS
RBC # BLD AUTO: 3.27 M/UL (ref 4.2–5.3)
SODIUM SERPL-SCNC: 141 MMOL/L (ref 136–145)
TRIGL SERPL-MCNC: 35 MG/DL (ref ?–150)
TROPONIN I SERPL-MCNC: 0.37 NG/ML (ref 0–0.04)
TROPONIN I SERPL-MCNC: 0.54 NG/ML (ref 0–0.04)
VENTRICULAR RATE, ECG03: 51 BPM
VENTRICULAR RATE, ECG03: 82 BPM
VENTRICULAR RATE, ECG03: 94 BPM
VLDLC SERPL CALC-MCNC: 7 MG/DL
WBC # BLD AUTO: 7.3 K/UL (ref 4.6–13.2)

## 2017-10-25 PROCEDURE — 90471 IMMUNIZATION ADMIN: CPT

## 2017-10-25 PROCEDURE — 93005 ELECTROCARDIOGRAM TRACING: CPT

## 2017-10-25 PROCEDURE — 65660000000 HC RM CCU STEPDOWN

## 2017-10-25 PROCEDURE — 74011250636 HC RX REV CODE- 250/636: Performed by: HOSPITALIST

## 2017-10-25 PROCEDURE — 85027 COMPLETE CBC AUTOMATED: CPT | Performed by: HOSPITALIST

## 2017-10-25 PROCEDURE — 93306 TTE W/DOPPLER COMPLETE: CPT

## 2017-10-25 PROCEDURE — 74011250637 HC RX REV CODE- 250/637: Performed by: NURSE PRACTITIONER

## 2017-10-25 PROCEDURE — 90686 IIV4 VACC NO PRSV 0.5 ML IM: CPT | Performed by: HOSPITALIST

## 2017-10-25 PROCEDURE — 36415 COLL VENOUS BLD VENIPUNCTURE: CPT | Performed by: HOSPITALIST

## 2017-10-25 PROCEDURE — 94760 N-INVAS EAR/PLS OXIMETRY 1: CPT

## 2017-10-25 PROCEDURE — 84484 ASSAY OF TROPONIN QUANT: CPT | Performed by: HOSPITALIST

## 2017-10-25 PROCEDURE — 74011250637 HC RX REV CODE- 250/637: Performed by: HOSPITALIST

## 2017-10-25 PROCEDURE — 80053 COMPREHEN METABOLIC PANEL: CPT | Performed by: HOSPITALIST

## 2017-10-25 PROCEDURE — 74011250636 HC RX REV CODE- 250/636: Performed by: EMERGENCY MEDICINE

## 2017-10-25 PROCEDURE — 80061 LIPID PANEL: CPT | Performed by: HOSPITALIST

## 2017-10-25 RX ORDER — THERA TABS 400 MCG
1 TAB ORAL DAILY
Status: DISCONTINUED | OUTPATIENT
Start: 2017-10-26 | End: 2017-10-26 | Stop reason: HOSPADM

## 2017-10-25 RX ORDER — ASPIRIN 81 MG/1
81 TABLET ORAL DAILY
Status: DISCONTINUED | OUTPATIENT
Start: 2017-10-26 | End: 2017-10-26 | Stop reason: HOSPADM

## 2017-10-25 RX ORDER — HYDRALAZINE HYDROCHLORIDE 25 MG/1
25 TABLET, FILM COATED ORAL 3 TIMES DAILY
Status: DISCONTINUED | OUTPATIENT
Start: 2017-10-25 | End: 2017-10-26

## 2017-10-25 RX ORDER — LORAZEPAM 2 MG/ML
3 INJECTION INTRAMUSCULAR
Status: DISCONTINUED | OUTPATIENT
Start: 2017-10-25 | End: 2017-10-26 | Stop reason: HOSPADM

## 2017-10-25 RX ORDER — LORAZEPAM 2 MG/ML
2 INJECTION INTRAMUSCULAR
Status: DISCONTINUED | OUTPATIENT
Start: 2017-10-25 | End: 2017-10-26 | Stop reason: HOSPADM

## 2017-10-25 RX ORDER — CHLORDIAZEPOXIDE HYDROCHLORIDE 25 MG/1
50 CAPSULE, GELATIN COATED ORAL EVERY 12 HOURS
Status: DISCONTINUED | OUTPATIENT
Start: 2017-10-26 | End: 2017-10-26 | Stop reason: HOSPADM

## 2017-10-25 RX ORDER — HALOPERIDOL 5 MG/ML
1 INJECTION INTRAMUSCULAR
Status: DISCONTINUED | OUTPATIENT
Start: 2017-10-25 | End: 2017-10-26 | Stop reason: HOSPADM

## 2017-10-25 RX ORDER — ASPIRIN 325 MG
325 TABLET, DELAYED RELEASE (ENTERIC COATED) ORAL DAILY
Status: DISCONTINUED | OUTPATIENT
Start: 2017-10-25 | End: 2017-10-25

## 2017-10-25 RX ORDER — LANOLIN ALCOHOL/MO/W.PET/CERES
100 CREAM (GRAM) TOPICAL DAILY
Status: DISCONTINUED | OUTPATIENT
Start: 2017-10-26 | End: 2017-10-26 | Stop reason: HOSPADM

## 2017-10-25 RX ORDER — CHLORDIAZEPOXIDE HYDROCHLORIDE 25 MG/1
50 CAPSULE, GELATIN COATED ORAL EVERY 8 HOURS
Status: COMPLETED | OUTPATIENT
Start: 2017-10-25 | End: 2017-10-26

## 2017-10-25 RX ORDER — LORAZEPAM 1 MG/1
1 TABLET ORAL
Status: DISCONTINUED | OUTPATIENT
Start: 2017-10-25 | End: 2017-10-26 | Stop reason: HOSPADM

## 2017-10-25 RX ORDER — CHLORDIAZEPOXIDE HYDROCHLORIDE 25 MG/1
25 CAPSULE, GELATIN COATED ORAL EVERY 12 HOURS
Status: DISCONTINUED | OUTPATIENT
Start: 2017-10-27 | End: 2017-10-26 | Stop reason: HOSPADM

## 2017-10-25 RX ORDER — SODIUM CHLORIDE 9 MG/ML
100 INJECTION, SOLUTION INTRAVENOUS CONTINUOUS
Status: DISCONTINUED | OUTPATIENT
Start: 2017-10-25 | End: 2017-10-26 | Stop reason: HOSPADM

## 2017-10-25 RX ORDER — SODIUM CHLORIDE 0.9 % (FLUSH) 0.9 %
5-10 SYRINGE (ML) INJECTION EVERY 8 HOURS
Status: DISCONTINUED | OUTPATIENT
Start: 2017-10-25 | End: 2017-10-26 | Stop reason: HOSPADM

## 2017-10-25 RX ORDER — CHLORDIAZEPOXIDE HYDROCHLORIDE 25 MG/1
25 CAPSULE, GELATIN COATED ORAL SEE ADMIN INSTRUCTIONS
Status: DISCONTINUED | OUTPATIENT
Start: 2017-10-25 | End: 2017-10-25

## 2017-10-25 RX ORDER — CHLORDIAZEPOXIDE HYDROCHLORIDE 25 MG/1
25 CAPSULE, GELATIN COATED ORAL EVERY 24 HOURS
Status: DISCONTINUED | OUTPATIENT
Start: 2017-10-28 | End: 2017-10-26 | Stop reason: HOSPADM

## 2017-10-25 RX ORDER — FOLIC ACID 1 MG/1
1 TABLET ORAL DAILY
Status: DISCONTINUED | OUTPATIENT
Start: 2017-10-26 | End: 2017-10-26

## 2017-10-25 RX ORDER — ATORVASTATIN CALCIUM 20 MG/1
20 TABLET, FILM COATED ORAL
Status: DISCONTINUED | OUTPATIENT
Start: 2017-10-25 | End: 2017-10-26 | Stop reason: HOSPADM

## 2017-10-25 RX ORDER — LORAZEPAM 2 MG/ML
1 INJECTION INTRAMUSCULAR
Status: DISCONTINUED | OUTPATIENT
Start: 2017-10-25 | End: 2017-10-26 | Stop reason: HOSPADM

## 2017-10-25 RX ORDER — LORAZEPAM 1 MG/1
2 TABLET ORAL
Status: DISCONTINUED | OUTPATIENT
Start: 2017-10-25 | End: 2017-10-26 | Stop reason: HOSPADM

## 2017-10-25 RX ORDER — ENOXAPARIN SODIUM 100 MG/ML
50 INJECTION SUBCUTANEOUS EVERY 12 HOURS
Status: DISCONTINUED | OUTPATIENT
Start: 2017-10-25 | End: 2017-10-26

## 2017-10-25 RX ORDER — SODIUM CHLORIDE 0.9 % (FLUSH) 0.9 %
5-10 SYRINGE (ML) INJECTION AS NEEDED
Status: DISCONTINUED | OUTPATIENT
Start: 2017-10-25 | End: 2017-10-26 | Stop reason: HOSPADM

## 2017-10-25 RX ORDER — HYDRALAZINE HYDROCHLORIDE 20 MG/ML
10 INJECTION INTRAMUSCULAR; INTRAVENOUS
Status: DISCONTINUED | OUTPATIENT
Start: 2017-10-25 | End: 2017-10-26 | Stop reason: HOSPADM

## 2017-10-25 RX ADMIN — ATORVASTATIN CALCIUM 20 MG: 20 TABLET, FILM COATED ORAL at 21:22

## 2017-10-25 RX ADMIN — HYDRALAZINE HYDROCHLORIDE 25 MG: 25 TABLET, FILM COATED ORAL at 13:06

## 2017-10-25 RX ADMIN — INFLUENZA VIRUS VACCINE 0.5 ML: 15; 15; 15; 15 SUSPENSION INTRAMUSCULAR at 08:48

## 2017-10-25 RX ADMIN — ENOXAPARIN SODIUM 50 MG: 60 INJECTION SUBCUTANEOUS at 00:27

## 2017-10-25 RX ADMIN — CHLORDIAZEPOXIDE HYDROCHLORIDE 50 MG: 25 CAPSULE ORAL at 21:22

## 2017-10-25 RX ADMIN — SODIUM CHLORIDE 100 ML/HR: 9 INJECTION, SOLUTION INTRAVENOUS at 01:55

## 2017-10-25 RX ADMIN — ASPIRIN 325 MG: 325 TABLET, DELAYED RELEASE ORAL at 08:48

## 2017-10-25 RX ADMIN — Medication 10 ML: at 21:23

## 2017-10-25 RX ADMIN — HYDRALAZINE HYDROCHLORIDE 25 MG: 25 TABLET, FILM COATED ORAL at 21:22

## 2017-10-25 RX ADMIN — ENOXAPARIN SODIUM 50 MG: 60 INJECTION SUBCUTANEOUS at 13:06

## 2017-10-25 RX ADMIN — HYDRALAZINE HYDROCHLORIDE 25 MG: 25 TABLET, FILM COATED ORAL at 15:21

## 2017-10-25 NOTE — CONSULTS
[facility] - Consult Note    Date of  Admission: 10/24/2017  5:00 PM     Primary Care Physician:  None     Plan:     1. ACS- in a patient w/o previous CAD history and recent Cocaine use. She is chest pain free no SOB no CHF symptoms. Will continue monitoring for any ischemia  Symptoms. Continue with Lovenox  SQ bid, asa and statin. Not on bb due to recent Cocaine use. Follow serial troponin. Ordered echo to asess lvf, rvf or any wma. Patient will need Cardiac w/u possible NUC stress test tomorrow. Further planes based  on clinicial course   2. Hypertension- slightly elevated will add low dose Hydralazine. 3. QTc prolongation- from Cocaine and alcohol use. resolved on today's ekg. 4. Alcohol abuse  5. Paroxymal Atrial Fib? - short run. Maintaining NSR    Patient with drug abuse admitted with chest pain. Has mild CE elevation with mild t wave inversion- likely from cocaine use. However cant rule out CAD. Will proceed with stress test/ echo tomorrow. Currently asymptomatic     Assessment:     Hospital Problems  Never Reviewed          Codes Class Noted POA    Alcohol abuse ICD-10-CM: F10.10  ICD-9-CM: 305.00  10/24/2017 Unknown        Syncope ICD-10-CM: R55  ICD-9-CM: 780.2  10/24/2017 Unknown        Cocaine abuse ICD-10-CM: F14.10  ICD-9-CM: 305.60  10/24/2017 Unknown        Elevated troponin ICD-10-CM: R74.8  ICD-9-CM: 790.6  10/24/2017 Unknown        QT prolongation ICD-10-CM: R94.31  ICD-9-CM: 794.31  10/24/2017 Unknown        NSTEMI (non-ST elevated myocardial infarction) [facility]) ICD-10-CM: I21.4  ICD-9-CM: 410.70  10/24/2017 Unknown                   History of Present Illness: This is a 46 y.o. female admitted for Syncope. Elevated troponin. QT prolongation. Cocaine abuse. Alcohol abuse and  NSTEMI (non-ST elevated myocardial infarction) ([facility] [address]  Patient w/o  significant past medical history presents with cocaine & alcohol abuse. The patient has been drinking heavily last night and smoking Cocaine. She reports not feeling well. She reports feeling dizzy. Then patient  lie down and passed out on the couch. The significant other she has been using cocaine. He mentions he had a hard time waking her up so he called EMS. The initial   ER eval - patient  noted to have elevated troponin and QTc prolongation. She is now resting in bed comfortable. deniea any chest pain or chest pressure. No SOB no palpitations no dizziness no  lightheadedness. No blood in stool or urine. No nausea or vomit. No recent CVA. Past Medical History:   History reviewed. No pertinent past medical history. Social History:     Social History     Social History    Marital status: SINGLE     Spouse name: N/A    Number of children: N/A    Years of education: N/A     Social History Main Topics    Smoking status: Never Smoker    Smokeless tobacco: Never Used    Alcohol use Yes      Comment: occational beer    Drug use: No    Sexual activity: Not Asked     Other Topics Concern    None     Social History Narrative        Family History:   History reviewed. No pertinent family history.      Medications:   No Known Allergies     Current Facility-Administered Medications   Medication Dose Route Frequency    0.9% sodium chloride infusion  100 mL/hr IntraVENous CONTINUOUS    enoxaparin (LOVENOX) injection 50 mg  50 mg SubCUTAneous Q12H    aspirin delayed-release tablet 325 mg  325 mg Oral DAILY    atorvastatin (LIPITOR) tablet 20 mg  20 mg Oral QHS    hydrALAZINE (APRESOLINE) 20 mg/mL injection 10 mg  10 mg IntraVENous Q6H PRN        Review Of Systems:         Constitutional: No fever, no chills, no weight loss, no night sweats   HEENT: No epistaxis, no nasal drainage, no difficulty in swallowing, no redness in eyes  Respiratory:  negative for cough, sputum, hemoptysis, pleurisy/chest pain, wheezing, dyspnea on exertion or emphysema  Cardiovascular: syncope  Gastrointestinal: nausea and vomiting  Genitourinary: No urinary symptoms or hematuria  Integument/breast: No ulcers or rashes  Musculoskeletal: no muscle pain, no weakness  Neurological: dizziness. Behvioral/Psych: No anxiety, no depression     Physical Exam:     Visit Vitals    BP (!) 144/91 (BP 1 Location: Right arm, BP Patient Position: At rest)    Pulse 89    Temp 99.1 °F (37.3 °C)    Resp 16    Wt 53.3 kg (117 lb 8 oz)    SpO2 99%    Breastfeeding No    BMI 20.17 kg/m2     BP Readings from Last 3 Encounters:   10/25/17 (!) 144/91   04/12/17 (!) 120/95   12/21/16 123/84     Pulse Readings from Last 3 Encounters:   10/25/17 89   04/12/17 83   12/21/16 91     Wt Readings from Last 3 Encounters:   10/25/17 53.3 kg (117 lb 8 oz)   04/12/17 57.2 kg (126 lb)   10/05/12 53.1 kg (117 lb)       General:  alert, cooperative, no distress, appears stated age  Skin: Warm and dry, acyanotic, normal color. Head: Normocephalic, atraumatic. Eyes: Sclerae anicteric, conjunctivae without injection. Neck:  nontender, no nuchal rigidity, no masses, no stridor, no carotid bruit, no JVD  Lungs:  clear to auscultation bilaterally. Heart:  regular rate and rhythm, S1, S2 normal, no S3 or S4, no click  Abdomen:  abdomen is soft without significant tenderness, masses, organomegaly or guarding  Extremities:  extremities normal, atraumatic, no cyanosis or edema  Neurological: grossly intact. No focal abnormalities, moves all extremities well. Psychiatric Affect: The patient is awake, alert and oriented x3. David Cordoba is interactive and appropriate.      Data Review:     Recent Results (from the past 48 hour(s))   EKG, 12 LEAD, INITIAL    Collection Time: 10/24/17  5:31 PM   Result Value Ref Range    Ventricular Rate 51 BPM    Atrial Rate 187 BPM    QRS Duration 88 ms    Q-T Interval 534 ms    QTC Calculation (Bezet) 492 ms    Calculated R Axis 36 degrees    Calculated T Axis 137 degrees    Diagnosis       Atrial fibrillation with slow ventricular response  Voltage criteria for left ventricular hypertrophy  Cannot rule out Septal infarct , age undetermined  ST & T wave abnormality, consider lateral ischemia  Abnormal ECG  No previous ECGs available     CBC WITH AUTOMATED DIFF    Collection Time: 10/24/17  6:46 PM   Result Value Ref Range    WBC 10.2 4.6 - 13.2 K/uL    RBC 3.85 (L) 4.20 - 5.30 M/uL    HGB 13.6 12.0 - 16.0 g/dL    HCT 40.1 35.0 - 45.0 %    .2 (H) 74.0 - 97.0 FL    MCH 35.3 (H) 24.0 - 34.0 PG    MCHC 33.9 31.0 - 37.0 g/dL    RDW 14.1 11.6 - 14.5 %    PLATELET 560 222 - 001 K/uL    MPV 9.5 9.2 - 11.8 FL    NEUTROPHILS 95 (H) 40 - 73 %    LYMPHOCYTES 4 (L) 21 - 52 %    MONOCYTES 1 (L) 3 - 10 %    EOSINOPHILS 0 0 - 5 %    BASOPHILS 0 0 - 2 %    ABS. NEUTROPHILS 9.6 (H) 1.8 - 8.0 K/UL    ABS. LYMPHOCYTES 0.5 (L) 0.9 - 3.6 K/UL    ABS. MONOCYTES 0.1 0.05 - 1.2 K/UL    ABS. EOSINOPHILS 0.0 0.0 - 0.4 K/UL    ABS.  BASOPHILS 0.0 0.0 - 0.1 K/UL    DF AUTOMATED     HCG URINE, QL    Collection Time: 10/24/17  8:24 PM   Result Value Ref Range    HCG urine, Ql. NEGATIVE  NEG     DRUG SCREEN, URINE    Collection Time: 10/24/17  8:24 PM   Result Value Ref Range    BENZODIAZEPINES NEGATIVE  NEG      BARBITURATES NEGATIVE  NEG      THC (TH-CANNABINOL) NEGATIVE  NEG      OPIATES NEGATIVE  NEG      PCP(PHENCYCLIDINE) NEGATIVE  NEG      COCAINE POSITIVE (A) NEG      AMPHETAMINES NEGATIVE  NEG      METHADONE NEGATIVE  NEG      HDSCOM (NOTE)    POC TROPONIN-I    Collection Time: 10/24/17  8:42 PM   Result Value Ref Range    Troponin-I (POC) 0.09 (H) 0.00 - 0.08 ng/mL   CARDIAC PANEL,(CK, CKMB & TROPONIN)    Collection Time: 10/24/17  8:57 PM   Result Value Ref Range     26 - 192 U/L    CK - MB 3.4 <3.6 ng/ml    CK-MB Index 1.9 0.0 - 4.0 %    Troponin-I, Qt. 0.24 (H) 0.0 - 1.951 NG/ML   METABOLIC PANEL, COMPREHENSIVE    Collection Time: 10/24/17  8:57 PM   Result Value Ref Range    Sodium 140 136 - 145 mmol/L    Potassium 3.8 3.5 - 5.5 mmol/L    Chloride 108 100 - 108 mmol/L    CO2 20 (L) 21 - 32 mmol/L    Anion gap 12 3.0 - 18 mmol/L    Glucose 70 (L) 74 - 99 mg/dL    BUN 16 7.0 - 18 MG/DL    Creatinine 0.67 0.6 - 1.3 MG/DL    BUN/Creatinine ratio 24 (H) 12 - 20      GFR est AA >60 >60 ml/min/1.73m2    GFR est non-AA >60 >60 ml/min/1.73m2    Calcium 8.2 (L) 8.5 - 10.1 MG/DL    Bilirubin, total 0.4 0.2 - 1.0 MG/DL    ALT (SGPT) 35 13 - 56 U/L    AST (SGOT) 57 (H) 15 - 37 U/L    Alk. phosphatase 78 45 - 117 U/L    Protein, total 8.5 (H) 6.4 - 8.2 g/dL    Albumin 4.1 3.4 - 5.0 g/dL    Globulin 4.4 (H) 2.0 - 4.0 g/dL    A-G Ratio 0.9 0.8 - 1.7     ETHYL ALCOHOL    Collection Time: 10/24/17  8:57 PM   Result Value Ref Range    ALCOHOL(ETHYL),SERUM 91 (H) 0 - 3 MG/DL   EKG, 12 LEAD, SUBSEQUENT    Collection Time: 10/24/17  9:10 PM   Result Value Ref Range    Ventricular Rate 82 BPM    Atrial Rate 82 BPM    P-R Interval 186 ms    QRS Duration 98 ms    Q-T Interval 466 ms    QTC Calculation (Bezet) 544 ms    Calculated P Axis 81 degrees    Calculated R Axis 58 degrees    Calculated T Axis 81 degrees    Diagnosis       Normal sinus rhythm  Possible Left atrial enlargement  Left ventricular hypertrophy  Anterior infarct (cited on or before 24-OCT-2017)  Prolonged QT  Abnormal ECG  When compared with ECG of 24-OCT-2017 17:31,  Sinus rhythm has replaced Atrial fibrillation  Vent.  rate has increased BY  31 BPM  Serial changes of Anterior infarct present     METABOLIC PANEL, COMPREHENSIVE    Collection Time: 10/25/17  2:29 AM   Result Value Ref Range    Sodium 141 136 - 145 mmol/L    Potassium 3.8 3.5 - 5.5 mmol/L    Chloride 109 (H) 100 - 108 mmol/L    CO2 22 21 - 32 mmol/L    Anion gap 10 3.0 - 18 mmol/L    Glucose 70 (L) 74 - 99 mg/dL    BUN 16 7.0 - 18 MG/DL    Creatinine 0.58 (L) 0.6 - 1.3 MG/DL    BUN/Creatinine ratio 28 (H) 12 - 20      GFR est AA >60 >60 ml/min/1.73m2    GFR est non-AA >60 >60 ml/min/1.73m2    Calcium 7.5 (L) 8.5 - 10.1 MG/DL    Bilirubin, total 0.5 0.2 - 1.0 MG/DL    ALT (SGPT) 30 13 - 56 U/L    AST (SGOT) 49 (H) 15 - 37 U/L    Alk. phosphatase 66 45 - 117 U/L    Protein, total 7.2 6.4 - 8.2 g/dL    Albumin 3.5 3.4 - 5.0 g/dL    Globulin 3.7 2.0 - 4.0 g/dL    A-G Ratio 0.9 0.8 - 1.7     LIPID PANEL    Collection Time: 10/25/17  2:29 AM   Result Value Ref Range    LIPID PROFILE          Cholesterol, total 166 <200 MG/DL    Triglyceride 35 <150 MG/DL    HDL Cholesterol 83 (H) 40 - 60 MG/DL    LDL, calculated 76 0 - 100 MG/DL    VLDL, calculated 7 MG/DL    CHOL/HDL Ratio 2.0 0 - 5.0     CBC W/O DIFF    Collection Time: 10/25/17  2:29 AM   Result Value Ref Range    WBC 7.3 4.6 - 13.2 K/uL    RBC 3.27 (L) 4.20 - 5.30 M/uL    HGB 11.3 (L) 12.0 - 16.0 g/dL    HCT 33.7 (L) 35.0 - 45.0 %    .1 (H) 74.0 - 97.0 FL    MCH 34.6 (H) 24.0 - 34.0 PG    MCHC 33.5 31.0 - 37.0 g/dL    RDW 13.3 11.6 - 14.5 %    PLATELET 965 876 - 515 K/uL    MPV 9.4 9.2 - 11.8 FL   TROPONIN I    Collection Time: 10/25/17  2:29 AM   Result Value Ref Range    Troponin-I, Qt. 0.54 (H) 0.0 - 0.045 NG/ML         Intake/Output Summary (Last 24 hours) at 10/25/17 1128  Last data filed at 10/25/17 1023   Gross per 24 hour   Intake                0 ml   Output                0 ml   Net                0 ml       Cardiographics:     ECG: NSR with QTc prolongation and LVH    Echocardiogram: ordered       Signed By: Cooper Conti NP supervised    October 25, 2017      I have independently evaluated and examined the patient. All relevant labs and testing data's are reviewed. Care plan discussed and updated after review.     Gama Marcano MD

## 2017-10-25 NOTE — ED NOTES
Pt resting on stretcher with eyes closed, observed rise and fall of chest.  Pt easily aroused by name calling. NAD noted, no new complaints voiced at this time. Family member remains at bedside.

## 2017-10-25 NOTE — ROUTINE PROCESS
Bedside and Verbal shift change report given to A Saba (oncoming nurse) by Yolanda Hartley   (offgoing nurse). Report included the following information SBAR, Intake/Output, MAR and Cardiac Rhythm Sinus Rhythm.

## 2017-10-25 NOTE — NURSE NAVIGATOR
Spoke with patient in room, with a friend at [place] [address] [phone]. The patient stated that the address on the face sheet is a friends address and that she stays with her mother Henrique Ram [phone] [address] [place] [address]. The [employer], [address] sometimes intermittently. She stated that the best phone # for contact is [phone]. She stated that she does not have a PCP at this time. She stated that she uses the city bus for her transportation. She plans to return to her mother address upon discharge and denies any need for DME's at this time. She stated that she is independent will ADL's. She stated she has never had Home Health before and feels she does not need this service at this time.

## 2017-10-25 NOTE — PROGRESS NOTES
Completed Echocardiogram. Report to follow. Patient to be transported back to room.     Jeremie Deutsch, MEGHAN, RDCS

## 2017-10-25 NOTE — ROUTINE PROCESS
TRANSFER - OUT REPORT:    Verbal report given to Son Martinez RN (name) on Wiliam Burnette  being transferred to [address] [place] [address] ([facility]) for routine progression of care       Report consisted of patients Situation, Background, Assessment and   Recommendations(SBAR). Information from the following report(s) SBAR, Kardex, ED Summary, Intake/Output, MAR and Recent Results was reviewed with the receiving nurse. Lines:   Peripheral IV 10/24/17 Right Antecubital (Active)   Site Assessment Clean, dry, & intact 10/24/2017  6:51 PM   Phlebitis Assessment 0 10/24/2017  6:51 PM   Infiltration Assessment 0 10/24/2017  6:51 PM   Dressing Status Clean, dry, & intact 10/24/2017  6:51 PM   Dressing Type Transparent 10/24/2017  6:51 PM   Hub Color/Line Status Flushed 10/24/2017  6:51 PM   Action Taken Blood drawn 10/24/2017  6:51 PM       Peripheral IV 10/24/17 Right Hand (Active)   Site Assessment Clean, dry, & intact 10/24/2017  9:05 PM   Phlebitis Assessment 0 10/24/2017  9:05 PM   Infiltration Assessment 0 10/24/2017  9:05 PM   Dressing Status Clean, dry, & intact 10/24/2017  9:05 PM   Dressing Type Transparent;Tape 10/24/2017  9:05 PM   Hub Color/Line Status Blue;Patent; Flushed 10/24/2017  9:05 PM   Action Taken Blood drawn 10/24/2017  9:05 PM        Opportunity for questions and clarification was provided.       Patient transported with:   Monitor  Registered Nurse

## 2017-10-25 NOTE — ED NOTES
TRANSFER - OUT REPORT:    Verbal report given to Courtney Watson RN (name) on Beto Hiss  being transferred to [address] [place] [address] ([facility]) for routine progression of care       Report consisted of patients Situation, Background, Assessment and   Recommendations(SBAR). Information from the following report(s) SBAR, Kardex, ED Summary, Intake/Output, MAR and Recent Results was reviewed with the receiving nurse. Lines:   Peripheral IV 10/24/17 Right Antecubital (Active)   Site Assessment Clean, dry, & intact 10/24/2017  6:51 PM   Phlebitis Assessment 0 10/24/2017  6:51 PM   Infiltration Assessment 0 10/24/2017  6:51 PM   Dressing Status Clean, dry, & intact 10/24/2017  6:51 PM   Dressing Type Transparent 10/24/2017  6:51 PM   Hub Color/Line Status Flushed 10/24/2017  6:51 PM   Action Taken Blood drawn 10/24/2017  6:51 PM       Peripheral IV 10/24/17 Right Hand (Active)   Site Assessment Clean, dry, & intact 10/24/2017  9:05 PM   Phlebitis Assessment 0 10/24/2017  9:05 PM   Infiltration Assessment 0 10/24/2017  9:05 PM   Dressing Status Clean, dry, & intact 10/24/2017  9:05 PM   Dressing Type Transparent;Tape 10/24/2017  9:05 PM   Hub Color/Line Status Blue;Patent; Flushed 10/24/2017  9:05 PM   Action Taken Blood drawn 10/24/2017  9:05 PM        Opportunity for questions and clarification was provided.       Patient transported with:   Monitor  Registered Nurse

## 2017-10-25 NOTE — PROGRESS NOTES
[facility] Hospitalist Group  Progress Note    Patient: Bety Rose Age: 46 y.o. : 1965 MR#: [id number] SSN: [id number]  Date: 10/25/2017     Subjective:     Reports feeling much improved. Denies chest pain, shortness of breath or nausea. States she used cocaine and drank ? Maybe 6 beers yesterday but she cannot remember for sure. Assessment/Plan:   1. Alcohol abuse - high risk for withdrawal, start CIWA protocol. Discussed with patient risk of withdrawal and continued alcohol abuse. 2.  Cocaine abuse - Erin Morales conversation with patient r/t illicit drug use and impact on health including risk of death. Patient states she is very motivated to stop using. No Beta Block in light of recent illicit drug use. 3.  NSTEMI - troponin elevated, now trending down. Echo pending, stress test tomorrow, NPO after midnight. 4.  Hypertension - Elevated earlier today, hydralazine ordered by cardiology. No meds PTA. 5.  QT prolongation - Initial prolongation in setting of #2; improved on recheck EKG today.      Additional Notes:      Case discussed with:  [x]Patient  [x]Family  [x]Nursing  []Case Management  DVT Prophylaxis:  [x]Lovenox  []Hep SQ  []SCDs  []Coumadin   []On Heparin gtt    Objective:   VS:   Visit Vitals    BP (!) 144/91 (BP 1 Location: Right arm, BP Patient Position: At rest)    Pulse 89    Temp 99.1 °F (37.3 °C)    Resp 16    Wt 53.3 kg (117 lb 8 oz)    SpO2 99%    Breastfeeding No    BMI 20.17 kg/m2      Tmax/24hrs: Temp (24hrs), Av.1 °F (36.7 °C), Min:97.3 °F (36.3 °C), Max:99.1 °F (37.3 °C)    Intake/Output Summary (Last 24 hours) at 10/25/17 1541  Last data filed at 10/25/17 1023   Gross per 24 hour   Intake                0 ml   Output                0 ml   Net                0 ml       General:  Alert, NAD  Cardiovascular:  RRR  Pulmonary:  LSC throughout; respiratory effort WNL  GI:  +BS in all four quadrants, soft, non-tender  Extremities: No edema; 2+ dorsalis pedis pulses bilaterally  Neuro: oriented x 4     Family contact: Emergency contact Ilda Rueda [phone], would benefit from AMD completion    Labs:    Recent Results (from the past 24 hour(s))   EKG, 12 LEAD, INITIAL    Collection Time: 10/24/17  5:31 PM   Result Value Ref Range    Ventricular Rate 51 BPM    Atrial Rate 187 BPM    QRS Duration 88 ms    Q-T Interval 534 ms    QTC Calculation (Bezet) 492 ms    Calculated R Axis 36 degrees    Calculated T Axis 137 degrees    Diagnosis       Atrial fibrillation with slow ventricular response  Voltage criteria for left ventricular hypertrophy  Cannot rule out Septal infarct , age undetermined  ST & T wave abnormality, consider lateral ischemia  Abnormal ECG  No previous ECGs available     CBC WITH AUTOMATED DIFF    Collection Time: 10/24/17  6:46 PM   Result Value Ref Range    WBC 10.2 4.6 - 13.2 K/uL    RBC 3.85 (L) 4.20 - 5.30 M/uL    HGB 13.6 12.0 - 16.0 g/dL    HCT 40.1 35.0 - 45.0 %    .2 (H) 74.0 - 97.0 FL    MCH 35.3 (H) 24.0 - 34.0 PG    MCHC 33.9 31.0 - 37.0 g/dL    RDW 14.1 11.6 - 14.5 %    PLATELET 852 840 - 138 K/uL    MPV 9.5 9.2 - 11.8 FL    NEUTROPHILS 95 (H) 40 - 73 %    LYMPHOCYTES 4 (L) 21 - 52 %    MONOCYTES 1 (L) 3 - 10 %    EOSINOPHILS 0 0 - 5 %    BASOPHILS 0 0 - 2 %    ABS. NEUTROPHILS 9.6 (H) 1.8 - 8.0 K/UL    ABS. LYMPHOCYTES 0.5 (L) 0.9 - 3.6 K/UL    ABS. MONOCYTES 0.1 0.05 - 1.2 K/UL    ABS. EOSINOPHILS 0.0 0.0 - 0.4 K/UL    ABS.  BASOPHILS 0.0 0.0 - 0.1 K/UL    DF AUTOMATED     HCG URINE, QL    Collection Time: 10/24/17  8:24 PM   Result Value Ref Range    HCG urine, Ql. NEGATIVE  NEG     DRUG SCREEN, URINE    Collection Time: 10/24/17  8:24 PM   Result Value Ref Range    BENZODIAZEPINES NEGATIVE  NEG      BARBITURATES NEGATIVE  NEG      THC (TH-CANNABINOL) NEGATIVE  NEG      OPIATES NEGATIVE  NEG      PCP(PHENCYCLIDINE) NEGATIVE  NEG      COCAINE POSITIVE (A) NEG      AMPHETAMINES NEGATIVE  NEG      METHADONE NEGATIVE  NEG      HDSCOM (NOTE)    POC TROPONIN-I    Collection Time: 10/24/17  8:42 PM   Result Value Ref Range    Troponin-I (POC) 0.09 (H) 0.00 - 0.08 ng/mL   CARDIAC PANEL,(CK, CKMB & TROPONIN)    Collection Time: 10/24/17  8:57 PM   Result Value Ref Range     26 - 192 U/L    CK - MB 3.4 <3.6 ng/ml    CK-MB Index 1.9 0.0 - 4.0 %    Troponin-I, Qt. 0.24 (H) 0.0 - 4.794 NG/ML   METABOLIC PANEL, COMPREHENSIVE    Collection Time: 10/24/17  8:57 PM   Result Value Ref Range    Sodium 140 136 - 145 mmol/L    Potassium 3.8 3.5 - 5.5 mmol/L    Chloride 108 100 - 108 mmol/L    CO2 20 (L) 21 - 32 mmol/L    Anion gap 12 3.0 - 18 mmol/L    Glucose 70 (L) 74 - 99 mg/dL    BUN 16 7.0 - 18 MG/DL    Creatinine 0.67 0.6 - 1.3 MG/DL    BUN/Creatinine ratio 24 (H) 12 - 20      GFR est AA >60 >60 ml/min/1.73m2    GFR est non-AA >60 >60 ml/min/1.73m2    Calcium 8.2 (L) 8.5 - 10.1 MG/DL    Bilirubin, total 0.4 0.2 - 1.0 MG/DL    ALT (SGPT) 35 13 - 56 U/L    AST (SGOT) 57 (H) 15 - 37 U/L    Alk. phosphatase 78 45 - 117 U/L    Protein, total 8.5 (H) 6.4 - 8.2 g/dL    Albumin 4.1 3.4 - 5.0 g/dL    Globulin 4.4 (H) 2.0 - 4.0 g/dL    A-G Ratio 0.9 0.8 - 1.7     ETHYL ALCOHOL    Collection Time: 10/24/17  8:57 PM   Result Value Ref Range    ALCOHOL(ETHYL),SERUM 91 (H) 0 - 3 MG/DL   EKG, 12 LEAD, SUBSEQUENT    Collection Time: 10/24/17  9:10 PM   Result Value Ref Range    Ventricular Rate 82 BPM    Atrial Rate 82 BPM    P-R Interval 186 ms    QRS Duration 98 ms    Q-T Interval 466 ms    QTC Calculation (Bezet) 544 ms    Calculated P Axis 81 degrees    Calculated R Axis 58 degrees    Calculated T Axis 81 degrees    Diagnosis       Normal sinus rhythm  Possible Left atrial enlargement  Left ventricular hypertrophy  Anterior infarct (cited on or before 24-OCT-2017)  Prolonged QT  Abnormal ECG  When compared with ECG of 24-OCT-2017 17:31,  Sinus rhythm has replaced Atrial fibrillation  Vent.  rate has increased BY  31 BPM  Serial changes of Anterior infarct present     METABOLIC PANEL, COMPREHENSIVE    Collection Time: 10/25/17  2:29 AM   Result Value Ref Range    Sodium 141 136 - 145 mmol/L    Potassium 3.8 3.5 - 5.5 mmol/L    Chloride 109 (H) 100 - 108 mmol/L    CO2 22 21 - 32 mmol/L    Anion gap 10 3.0 - 18 mmol/L    Glucose 70 (L) 74 - 99 mg/dL    BUN 16 7.0 - 18 MG/DL    Creatinine 0.58 (L) 0.6 - 1.3 MG/DL    BUN/Creatinine ratio 28 (H) 12 - 20      GFR est AA >60 >60 ml/min/1.73m2    GFR est non-AA >60 >60 ml/min/1.73m2    Calcium 7.5 (L) 8.5 - 10.1 MG/DL    Bilirubin, total 0.5 0.2 - 1.0 MG/DL    ALT (SGPT) 30 13 - 56 U/L    AST (SGOT) 49 (H) 15 - 37 U/L    Alk.  phosphatase 66 45 - 117 U/L    Protein, total 7.2 6.4 - 8.2 g/dL    Albumin 3.5 3.4 - 5.0 g/dL    Globulin 3.7 2.0 - 4.0 g/dL    A-G Ratio 0.9 0.8 - 1.7     LIPID PANEL    Collection Time: 10/25/17  2:29 AM   Result Value Ref Range    LIPID PROFILE          Cholesterol, total 166 <200 MG/DL    Triglyceride 35 <150 MG/DL    HDL Cholesterol 83 (H) 40 - 60 MG/DL    LDL, calculated 76 0 - 100 MG/DL    VLDL, calculated 7 MG/DL    CHOL/HDL Ratio 2.0 0 - 5.0     CBC W/O DIFF    Collection Time: 10/25/17  2:29 AM   Result Value Ref Range    WBC 7.3 4.6 - 13.2 K/uL    RBC 3.27 (L) 4.20 - 5.30 M/uL    HGB 11.3 (L) 12.0 - 16.0 g/dL    HCT 33.7 (L) 35.0 - 45.0 %    .1 (H) 74.0 - 97.0 FL    MCH 34.6 (H) 24.0 - 34.0 PG    MCHC 33.5 31.0 - 37.0 g/dL    RDW 13.3 11.6 - 14.5 %    PLATELET 363 916 - 144 K/uL    MPV 9.4 9.2 - 11.8 FL   TROPONIN I    Collection Time: 10/25/17  2:29 AM   Result Value Ref Range    Troponin-I, Qt. 0.54 (H) 0.0 - 0.045 NG/ML   TROPONIN I    Collection Time: 10/25/17  1:25 PM   Result Value Ref Range    Troponin-I, Qt. 0.37 (H) 0.0 - 0.045 NG/ML   EKG, 12 LEAD, SUBSEQUENT    Collection Time: 10/25/17  1:27 PM   Result Value Ref Range    Ventricular Rate 94 BPM    Atrial Rate 94 BPM    P-R Interval 184 ms    QRS Duration 102 ms    Q-T Interval 384 ms    QTC Calculation (Bezet) 480 ms    Calculated P Axis 70 degrees    Calculated R Axis 49 degrees    Calculated T Axis 88 degrees    Diagnosis       Normal sinus rhythm  Voltage criteria for left ventricular hypertrophy  Cannot rule out Septal infarct (cited on or before 24-OCT-2017)  T wave abnormality, consider anterior ischemia  Abnormal ECG  When compared with ECG of 24-OCT-2017 21:10,  Serial changes of evolving Septal infarct present       Signed By: Maurice Cruz NP     October 25, 2017

## 2017-10-25 NOTE — H&P
HISTORY & PHYSICAL            Patient: Juan Hernandez MRN: [id number]  CSN: [id number]    YOB: 1965  Age: 46 y.o. Sex: female    DOA: 10/24/2017 LOS:  LOS: 1 day        DOA: 10/24/2017        Assessment/Plan     Active Problems:    Alcohol abuse (10/24/2017)      Syncope (10/24/2017)      Cocaine abuse (10/24/2017)      Elevated troponin (10/24/2017)      QT prolongation (10/24/2017)      NSTEMI (non-ST elevated myocardial infarction) ([address] (10/24/2017)        Plan:  1. Alcohol Abuse - IVF   2. Cocaine abuse   3. NSTEMI - likely from cocaine abuse - ASA, statins , No BB since she used cocaine recently , Lovenox sq [id number] , cardiology consulted by ER, Troponin [id number]   DVT Px - Lovenox   Full code     Severity of Signs & Symptoms - Moderate   Risk of adverse events - Moderate   Current Medical Rx Plan - As Above   Patient history & comorbidities - per HPI   Discharge Plan - Home             HPI:         Juan Hernandez   Is a 49-year-old female with no significant past medical history presents with cocaine & alcohol abuse. The patient has been drinking heavily last nigth and passed out on the couch. The significant other she has been using cocaine. He mentions he had a hard time waking her up so he called EMS   ER eval - pt noted to have NSTEMI with elevated troponin & EKG changes   Cardiology consulted - recommended Lovenox   Will admit for further eval.     History reviewed. No pertinent past medical history. History reviewed. No pertinent surgical history. History reviewed. No pertinent family history.     Social History     Social History    Marital status: SINGLE     Spouse name: N/A    Number of children: N/A    Years of education: N/A     Social History Main Topics    Smoking status: Never Smoker    Smokeless tobacco: Never Used    Alcohol use Yes      Comment: occational beer    Drug use: No    Sexual activity: Not Asked     Other Topics Concern    None     Social History Narrative       Prior to Admission medications    Medication Sig Start Date End Date Taking? Authorizing Provider   naproxen (NAPROSYN) 375 mg tablet Take 1 Tab by mouth two (2) times daily (with meals). 4/12/17   ARCHIE Diaz   promethazine (PHENERGAN) 25 mg tablet Take 1 Tab by mouth every six (6) hours as needed. Indications: Nausea/Vomiting 12/21/16   ARCHIE Alvarez       No Known Allergies    Review of Systems  A comprehensive review of systems was negative except for that written in the History of Present Illness. Physical Exam:      Visit Vitals    /85 (BP 1 Location: Right arm, BP Patient Position: At rest)    Pulse 71    Temp 98.3 °F (36.8 °C)    Resp 18    Wt 53.3 kg (117 lb 8 oz)    SpO2 99%    Breastfeeding No    BMI 20.17 kg/m2       Physical Exam:    Gen: In general, this is a well nourished female in no acute distress  HEENT: Sclerae nonicteric. Oral mucous membranes moist. Dentition normal  Neck: Supple with midline trachea. CV: RRR without murmur or rub appreciated. Resp:Respirations are unlabored without use of accessory muscles. Lung fields bilaterally without wheezes or rhonchi. Abd: Soft, nontender, nondistended. Extrem: Extremities are warm, without cyanosis or clubbing. No pitting pretibial edema. Palpable distal pulses X 4.   Skin: Warm, no visible rashes. Neuro: Patient is alert, oriented, and cooperative. No obvious focal defects. Moves all 4 extremities.     Labs Reviewed:    Recent Results (from the past 24 hour(s))   EKG, 12 LEAD, INITIAL    Collection Time: 10/24/17  5:31 PM   Result Value Ref Range    Ventricular Rate 51 BPM    Atrial Rate 187 BPM    QRS Duration 88 ms    Q-T Interval 534 ms    QTC Calculation (Bezet) 492 ms    Calculated R Axis 36 degrees    Calculated T Axis 137 degrees    Diagnosis       Atrial fibrillation with slow ventricular response  Voltage criteria for left ventricular hypertrophy  Cannot rule out Septal infarct , age undetermined  ST & T wave abnormality, consider lateral ischemia  Abnormal ECG  No previous ECGs available     CBC WITH AUTOMATED DIFF    Collection Time: 10/24/17  6:46 PM   Result Value Ref Range    WBC 10.2 4.6 - 13.2 K/uL    RBC 3.85 (L) 4.20 - 5.30 M/uL    HGB 13.6 12.0 - 16.0 g/dL    HCT 40.1 35.0 - 45.0 %    .2 (H) 74.0 - 97.0 FL    MCH 35.3 (H) 24.0 - 34.0 PG    MCHC 33.9 31.0 - 37.0 g/dL    RDW 14.1 11.6 - 14.5 %    PLATELET 417 157 - 011 K/uL    MPV 9.5 9.2 - 11.8 FL    NEUTROPHILS 95 (H) 40 - 73 %    LYMPHOCYTES 4 (L) 21 - 52 %    MONOCYTES 1 (L) 3 - 10 %    EOSINOPHILS 0 0 - 5 %    BASOPHILS 0 0 - 2 %    ABS. NEUTROPHILS 9.6 (H) 1.8 - 8.0 K/UL    ABS. LYMPHOCYTES 0.5 (L) 0.9 - 3.6 K/UL    ABS. MONOCYTES 0.1 0.05 - 1.2 K/UL    ABS. EOSINOPHILS 0.0 0.0 - 0.4 K/UL    ABS.  BASOPHILS 0.0 0.0 - 0.1 K/UL    DF AUTOMATED     HCG URINE, QL    Collection Time: 10/24/17  8:24 PM   Result Value Ref Range    HCG urine, Ql. NEGATIVE  NEG     DRUG SCREEN, URINE    Collection Time: 10/24/17  8:24 PM   Result Value Ref Range    BENZODIAZEPINES NEGATIVE  NEG      BARBITURATES NEGATIVE  NEG      THC (TH-CANNABINOL) NEGATIVE  NEG      OPIATES NEGATIVE  NEG      PCP(PHENCYCLIDINE) NEGATIVE  NEG      COCAINE POSITIVE (A) NEG      AMPHETAMINES NEGATIVE  NEG      METHADONE NEGATIVE  NEG      HDSCOM (NOTE)    POC TROPONIN-I    Collection Time: 10/24/17  8:42 PM   Result Value Ref Range    Troponin-I (POC) 0.09 (H) 0.00 - 0.08 ng/mL   CARDIAC PANEL,(CK, CKMB & TROPONIN)    Collection Time: 10/24/17  8:57 PM   Result Value Ref Range     26 - 192 U/L    CK - MB 3.4 <3.6 ng/ml    CK-MB Index 1.9 0.0 - 4.0 %    Troponin-I, Qt. 0.24 (H) 0.0 - 3.112 NG/ML   METABOLIC PANEL, COMPREHENSIVE    Collection Time: 10/24/17  8:57 PM   Result Value Ref Range    Sodium 140 136 - 145 mmol/L    Potassium 3.8 3.5 - 5.5 mmol/L    Chloride 108 100 - 108 mmol/L    CO2 20 (L) 21 - 32 mmol/L    Anion gap 12 3.0 - 18 mmol/L    Glucose 70 (L) 74 - 99 mg/dL    BUN 16 7.0 - 18 MG/DL    Creatinine 0.67 0.6 - 1.3 MG/DL    BUN/Creatinine ratio 24 (H) 12 - 20      GFR est AA >60 >60 ml/min/1.73m2    GFR est non-AA >60 >60 ml/min/1.73m2    Calcium 8.2 (L) 8.5 - 10.1 MG/DL    Bilirubin, total 0.4 0.2 - 1.0 MG/DL    ALT (SGPT) 35 13 - 56 U/L    AST (SGOT) 57 (H) 15 - 37 U/L    Alk. phosphatase 78 45 - 117 U/L    Protein, total 8.5 (H) 6.4 - 8.2 g/dL    Albumin 4.1 3.4 - 5.0 g/dL    Globulin 4.4 (H) 2.0 - 4.0 g/dL    A-G Ratio 0.9 0.8 - 1.7     ETHYL ALCOHOL    Collection Time: 10/24/17  8:57 PM   Result Value Ref Range    ALCOHOL(ETHYL),SERUM 91 (H) 0 - 3 MG/DL   EKG, 12 LEAD, SUBSEQUENT    Collection Time: 10/24/17  9:10 PM   Result Value Ref Range    Ventricular Rate 82 BPM    Atrial Rate 82 BPM    P-R Interval 186 ms    QRS Duration 98 ms    Q-T Interval 466 ms    QTC Calculation (Bezet) 544 ms    Calculated P Axis 81 degrees    Calculated R Axis 58 degrees    Calculated T Axis 81 degrees    Diagnosis       Normal sinus rhythm  Possible Left atrial enlargement  Left ventricular hypertrophy  Anterior infarct (cited on or before 24-OCT-2017)  Prolonged QT  Abnormal ECG  When compared with ECG of 24-OCT-2017 17:31,  Sinus rhythm has replaced Atrial fibrillation  Vent. rate has increased BY  31 BPM  Serial changes of Anterior infarct present     METABOLIC PANEL, COMPREHENSIVE    Collection Time: 10/25/17  2:29 AM   Result Value Ref Range    Sodium 141 136 - 145 mmol/L    Potassium 3.8 3.5 - 5.5 mmol/L    Chloride 109 (H) 100 - 108 mmol/L    CO2 22 21 - 32 mmol/L    Anion gap 10 3.0 - 18 mmol/L    Glucose 70 (L) 74 - 99 mg/dL    BUN 16 7.0 - 18 MG/DL    Creatinine 0.58 (L) 0.6 - 1.3 MG/DL    BUN/Creatinine ratio 28 (H) 12 - 20      GFR est AA >60 >60 ml/min/1.73m2    GFR est non-AA >60 >60 ml/min/1.73m2    Calcium 7.5 (L) 8.5 - 10.1 MG/DL    Bilirubin, total 0.5 0.2 - 1.0 MG/DL    ALT (SGPT) 30 13 - 56 U/L    AST (SGOT) 49 (H) 15 - 37 U/L    Alk.  phosphatase 66 45 - 117 U/L    Protein, total 7.2 6.4 - 8.2 g/dL    Albumin 3.5 3.4 - 5.0 g/dL    Globulin 3.7 2.0 - 4.0 g/dL    A-G Ratio 0.9 0.8 - 1.7     LIPID PANEL    Collection Time: 10/25/17  2:29 AM   Result Value Ref Range    LIPID PROFILE          Cholesterol, total 166 <200 MG/DL    Triglyceride 35 <150 MG/DL    HDL Cholesterol 83 (H) 40 - 60 MG/DL    LDL, calculated 76 0 - 100 MG/DL    VLDL, calculated 7 MG/DL    CHOL/HDL Ratio 2.0 0 - 5.0     CBC W/O DIFF    Collection Time: 10/25/17  2:29 AM   Result Value Ref Range    WBC 7.3 4.6 - 13.2 K/uL    RBC 3.27 (L) 4.20 - 5.30 M/uL    HGB 11.3 (L) 12.0 - 16.0 g/dL    HCT 33.7 (L) 35.0 - 45.0 %    .1 (H) 74.0 - 97.0 FL    MCH 34.6 (H) 24.0 - 34.0 PG    MCHC 33.5 31.0 - 37.0 g/dL    RDW 13.3 11.6 - 14.5 %    PLATELET 908 684 - 966 K/uL    MPV 9.4 9.2 - 11.8 FL   TROPONIN I    Collection Time: 10/25/17  2:29 AM   Result Value Ref Range    Troponin-I, Qt. 0.54 (H) 0.0 - 0.045 NG/ML       Imaging Reviewed:    Reviewed           Lashonda Miller MD  10/25/2017, 11:10 PM

## 2017-10-25 NOTE — ED NOTES
ADDENDUM: Patient care transferred to myself from Dr. Lisa Starkey pending troponin and labs. Patient reports that she was out drinking and using cocaine last night. A friend dropped her off at her house this am and she slept on the couch all day.  called 911 because she was unresponsive at home on the cough, unclear if she synopsized on her way back to the bathroom or if she was just unresponsive on the couch. She was reportedly very diaphoretic. Patient denies any CP or SOB, she does report \"feeling bad\" but attributes this to drinking too much. Trop elevated at 0.24, Qtc 544 ms. Will admit for cardiac tele and ACS r/o. Consult: Spoke to Dr. Ozzy Lemus, hospitalist, agrees to admit, but requested that I call cardiology for recommendations about heparinization. Consult: Spoke to Martin Hylton, cardiology PA, discussed the case, EKG and troponin and he recommended lovenox 1 mg/kg SQ and will come to see the patient in the morning.     Bay Anton MD

## 2017-10-26 VITALS
DIASTOLIC BLOOD PRESSURE: 79 MMHG | HEIGHT: 64 IN | BODY MASS INDEX: 19.94 KG/M2 | OXYGEN SATURATION: 98 % | HEART RATE: 68 BPM | TEMPERATURE: 98.3 F | SYSTOLIC BLOOD PRESSURE: 116 MMHG | RESPIRATION RATE: 18 BRPM | WEIGHT: 116.8 LBS

## 2017-10-26 LAB
ALBUMIN SERPL-MCNC: 2.9 G/DL (ref 3.4–5)
ALBUMIN/GLOB SERPL: 1 {RATIO} (ref 0.8–1.7)
ALP SERPL-CCNC: 63 U/L (ref 45–117)
ALT SERPL-CCNC: 23 U/L (ref 13–56)
ANION GAP SERPL CALC-SCNC: 7 MMOL/L (ref 3–18)
AST SERPL-CCNC: 41 U/L (ref 15–37)
ATTENDING PHYSICIAN, CST07: NORMAL
BASOPHILS # BLD: 0 K/UL (ref 0–0.1)
BASOPHILS NFR BLD: 0 % (ref 0–2)
BILIRUB DIRECT SERPL-MCNC: 0.1 MG/DL (ref 0–0.2)
BILIRUB SERPL-MCNC: 0.3 MG/DL (ref 0.2–1)
BUN SERPL-MCNC: 11 MG/DL (ref 7–18)
BUN/CREAT SERPL: 19 (ref 12–20)
CALCIUM SERPL-MCNC: 7.7 MG/DL (ref 8.5–10.1)
CHLORIDE SERPL-SCNC: 107 MMOL/L (ref 100–108)
CO2 SERPL-SCNC: 25 MMOL/L (ref 21–32)
CREAT SERPL-MCNC: 0.57 MG/DL (ref 0.6–1.3)
DIAGNOSIS, 93000: NORMAL
DIFFERENTIAL METHOD BLD: ABNORMAL
DUKE TM SCORE RESULT, CST14: NORMAL
DUKE TREADMILL SCORE, CST13: NORMAL
ECG INTERP BEFORE EX, CST11: NORMAL
ECG INTERP DURING EX, CST12: NORMAL
EOSINOPHIL # BLD: 0 K/UL (ref 0–0.4)
EOSINOPHIL NFR BLD: 1 % (ref 0–5)
ERYTHROCYTE [DISTWIDTH] IN BLOOD BY AUTOMATED COUNT: 13 % (ref 11.6–14.5)
FUNCTIONAL CAPACITY, CST17: NORMAL
GLOBULIN SER CALC-MCNC: 2.8 G/DL (ref 2–4)
GLUCOSE SERPL-MCNC: 101 MG/DL (ref 74–99)
HCT VFR BLD AUTO: 31.6 % (ref 35–45)
HGB BLD-MCNC: 10.8 G/DL (ref 12–16)
KNOWN CARDIAC CONDITION, CST08: NORMAL
LYMPHOCYTES # BLD: 1.8 K/UL (ref 0.9–3.6)
LYMPHOCYTES NFR BLD: 47 % (ref 21–52)
MAGNESIUM SERPL-MCNC: 1.9 MG/DL (ref 1.6–2.6)
MAX. DIASTOLIC BP, CST04: 102 MMHG
MAX. HEART RATE, CST05: 108 BPM
MAX. SYSTOLIC BP, CST03: 138 MMHG
MCH RBC QN AUTO: 34.3 PG (ref 24–34)
MCHC RBC AUTO-ENTMCNC: 34.2 G/DL (ref 31–37)
MCV RBC AUTO: 100.3 FL (ref 74–97)
MONOCYTES # BLD: 0.2 K/UL (ref 0.05–1.2)
MONOCYTES NFR BLD: 5 % (ref 3–10)
NEUTS SEG # BLD: 1.8 K/UL (ref 1.8–8)
NEUTS SEG NFR BLD: 47 % (ref 40–73)
OVERALL BP RESPONSE TO EXERCISE, CST16: NORMAL
OVERALL HR RESPONSE TO EXERCISE, CST15: NORMAL
PEAK EX METS, CST10: 1 METS
PHOSPHATE SERPL-MCNC: 1.7 MG/DL (ref 2.5–4.9)
PLATELET # BLD AUTO: 221 K/UL (ref 135–420)
PMV BLD AUTO: 9.3 FL (ref 9.2–11.8)
POTASSIUM SERPL-SCNC: 3.4 MMOL/L (ref 3.5–5.5)
PROT SERPL-MCNC: 5.7 G/DL (ref 6.4–8.2)
PROTOCOL NAME, CST01: NORMAL
RBC # BLD AUTO: 3.15 M/UL (ref 4.2–5.3)
SODIUM SERPL-SCNC: 139 MMOL/L (ref 136–145)
TEST INDICATION, CST09: NORMAL
WBC # BLD AUTO: 3.8 K/UL (ref 4.6–13.2)

## 2017-10-26 PROCEDURE — 74011250637 HC RX REV CODE- 250/637: Performed by: NURSE PRACTITIONER

## 2017-10-26 PROCEDURE — 74011250636 HC RX REV CODE- 250/636: Performed by: INTERNAL MEDICINE

## 2017-10-26 PROCEDURE — 93017 CV STRESS TEST TRACING ONLY: CPT | Performed by: INTERNAL MEDICINE

## 2017-10-26 PROCEDURE — 80076 HEPATIC FUNCTION PANEL: CPT | Performed by: HOSPITALIST

## 2017-10-26 PROCEDURE — 80048 BASIC METABOLIC PNL TOTAL CA: CPT | Performed by: HOSPITALIST

## 2017-10-26 PROCEDURE — 85025 COMPLETE CBC W/AUTO DIFF WBC: CPT | Performed by: HOSPITALIST

## 2017-10-26 PROCEDURE — 36415 COLL VENOUS BLD VENIPUNCTURE: CPT | Performed by: HOSPITALIST

## 2017-10-26 PROCEDURE — 78452 HT MUSCLE IMAGE SPECT MULT: CPT | Performed by: INTERNAL MEDICINE

## 2017-10-26 PROCEDURE — 74011250636 HC RX REV CODE- 250/636: Performed by: HOSPITALIST

## 2017-10-26 PROCEDURE — 93306 TTE W/DOPPLER COMPLETE: CPT

## 2017-10-26 PROCEDURE — 74011250637 HC RX REV CODE- 250/637: Performed by: HOSPITALIST

## 2017-10-26 PROCEDURE — 83735 ASSAY OF MAGNESIUM: CPT | Performed by: HOSPITALIST

## 2017-10-26 PROCEDURE — 84100 ASSAY OF PHOSPHORUS: CPT | Performed by: HOSPITALIST

## 2017-10-26 PROCEDURE — A9500 TC99M SESTAMIBI: HCPCS

## 2017-10-26 RX ORDER — SODIUM CHLORIDE 9 MG/ML
250 INJECTION, SOLUTION INTRAVENOUS ONCE
Status: COMPLETED | OUTPATIENT
Start: 2017-10-26 | End: 2017-10-26

## 2017-10-26 RX ORDER — DOCUSATE SODIUM 100 MG/1
CAPSULE, LIQUID FILLED ORAL
Qty: 60 CAP | Refills: 0 | Status: SHIPPED | OUTPATIENT
Start: 2017-10-26 | End: 2021-07-31

## 2017-10-26 RX ORDER — AMLODIPINE BESYLATE 5 MG/1
5 TABLET ORAL DAILY
Qty: 30 TAB | Refills: 0 | Status: SHIPPED | OUTPATIENT
Start: 2017-10-27 | End: 2017-10-26

## 2017-10-26 RX ORDER — THERA TABS 400 MCG
1 TAB ORAL DAILY
Qty: 30 TAB | Refills: 0 | Status: SHIPPED | OUTPATIENT
Start: 2017-10-27

## 2017-10-26 RX ORDER — LANOLIN ALCOHOL/MO/W.PET/CERES
100 CREAM (GRAM) TOPICAL DAILY
Qty: 30 TAB | Refills: 0 | Status: SHIPPED | OUTPATIENT
Start: 2017-10-27

## 2017-10-26 RX ORDER — SODIUM,POTASSIUM PHOSPHATES 280-250MG
1 POWDER IN PACKET (EA) ORAL 2 TIMES DAILY
Status: DISCONTINUED | OUTPATIENT
Start: 2017-10-26 | End: 2017-10-26 | Stop reason: HOSPADM

## 2017-10-26 RX ORDER — ATORVASTATIN CALCIUM 20 MG/1
20 TABLET, FILM COATED ORAL
Qty: 30 TAB | Refills: 0 | Status: SHIPPED | OUTPATIENT
Start: 2017-10-26

## 2017-10-26 RX ORDER — POTASSIUM CHLORIDE 20 MEQ/1
20 TABLET, EXTENDED RELEASE ORAL
Status: COMPLETED | OUTPATIENT
Start: 2017-10-26 | End: 2017-10-26

## 2017-10-26 RX ORDER — FOLIC ACID 1 MG/1
1 TABLET ORAL DAILY
Qty: 30 TAB | Refills: 0 | Status: SHIPPED | OUTPATIENT
Start: 2017-10-27

## 2017-10-26 RX ORDER — DOCUSATE SODIUM 100 MG/1
100 CAPSULE, LIQUID FILLED ORAL 2 TIMES DAILY
Qty: 60 CAP | Refills: 2 | Status: SHIPPED | OUTPATIENT
Start: 2017-10-26 | End: 2018-01-24

## 2017-10-26 RX ORDER — FOLIC ACID 1 MG/1
1 TABLET ORAL DAILY
Status: DISCONTINUED | OUTPATIENT
Start: 2017-10-26 | End: 2017-10-26

## 2017-10-26 RX ORDER — AMLODIPINE BESYLATE 5 MG/1
5 TABLET ORAL DAILY
Status: DISCONTINUED | OUTPATIENT
Start: 2017-10-27 | End: 2017-10-26 | Stop reason: HOSPADM

## 2017-10-26 RX ORDER — ASPIRIN 81 MG/1
81 TABLET ORAL DAILY
Qty: 30 TAB | Refills: 0 | Status: SHIPPED | OUTPATIENT
Start: 2017-10-27

## 2017-10-26 RX ORDER — SODIUM,POTASSIUM PHOSPHATES 280-250MG
1 POWDER IN PACKET (EA) ORAL 2 TIMES DAILY
Qty: 5 PACKET | Refills: 0 | Status: SHIPPED | OUTPATIENT
Start: 2017-10-26 | End: 2017-10-29

## 2017-10-26 RX ORDER — FOLIC ACID 1 MG/1
1 TABLET ORAL DAILY
Status: DISCONTINUED | OUTPATIENT
Start: 2017-10-27 | End: 2017-10-26 | Stop reason: HOSPADM

## 2017-10-26 RX ORDER — AMLODIPINE BESYLATE 5 MG/1
2.5 TABLET ORAL DAILY
Qty: 30 TAB | Refills: 0 | Status: SHIPPED | OUTPATIENT
Start: 2017-10-27 | End: 2021-09-07 | Stop reason: SDUPTHER

## 2017-10-26 RX ADMIN — CHLORDIAZEPOXIDE HYDROCHLORIDE 50 MG: 25 CAPSULE ORAL at 06:16

## 2017-10-26 RX ADMIN — FOLIC ACID 1 MG: 1 TABLET ORAL at 08:26

## 2017-10-26 RX ADMIN — THERA TABS 1 TABLET: TAB at 08:26

## 2017-10-26 RX ADMIN — Medication 100 MG: at 08:26

## 2017-10-26 RX ADMIN — POTASSIUM & SODIUM PHOSPHATES POWDER PACK 280-160-250 MG 1 PACKET: 280-160-250 PACK at 13:12

## 2017-10-26 RX ADMIN — ASPIRIN 81 MG: 81 TABLET, COATED ORAL at 08:26

## 2017-10-26 RX ADMIN — HYDRALAZINE HYDROCHLORIDE 25 MG: 25 TABLET, FILM COATED ORAL at 08:26

## 2017-10-26 RX ADMIN — SODIUM CHLORIDE 100 ML/HR: 9 INJECTION, SOLUTION INTRAVENOUS at 00:54

## 2017-10-26 RX ADMIN — POTASSIUM CHLORIDE 20 MEQ: 20 TABLET, EXTENDED RELEASE ORAL at 13:12

## 2017-10-26 RX ADMIN — ENOXAPARIN SODIUM 50 MG: 60 INJECTION SUBCUTANEOUS at 00:53

## 2017-10-26 RX ADMIN — ENOXAPARIN SODIUM 50 MG: 60 INJECTION SUBCUTANEOUS at 13:12

## 2017-10-26 RX ADMIN — REGADENOSON 0.4 MG: 0.08 INJECTION, SOLUTION INTRAVENOUS at 10:00

## 2017-10-26 RX ADMIN — SODIUM CHLORIDE 250 ML/HR: 900 INJECTION, SOLUTION INTRAVENOUS at 10:00

## 2017-10-26 RX ADMIN — CHLORDIAZEPOXIDE HYDROCHLORIDE 50 MG: 25 CAPSULE ORAL at 13:13

## 2017-10-26 NOTE — DISCHARGE INSTRUCTIONS
Acute Alcohol Intoxication: Care Instructions  Your Care Instructions    You have had treatment to help your body rid itself of alcohol. Too much alcohol upsets the body's fluid balance. Your doctor may have given you fluids and vitamins. For some people, drinking too much alcohol is a one-time event. For others, it is an ongoing problem. In either case, it is serious. It can be life-threatening. Follow-up care is a key part of your treatment and safety. Be sure to make and go to all appointments, and call your doctor if you are having problems. It's also a good idea to know your test results and keep a list of the medicines you take. How can you care for yourself at home? · Do not drink and drive. · Be safe with medicines. Take your medicines exactly as prescribed. Call your doctor if you think you are having a problem with your medicine. · Your doctor may have prescribed disulfiram (Antabuse). Do not drink any alcohol while you are taking this medicine. You may have severe or even life-threatening side effects from even small amounts of alcohol. · If you were given medicine to prevent nausea, be sure to take it exactly as prescribed. · Before you take any medicine, tell your doctor if:  ¨ You have had a bad reaction to any medicines in the past.  ¨ You are taking other medicines, including over-the-counter ones, or have other health problems. ¨ You are or could be pregnant. · Be prepared to have some symptoms of withdrawal in the next few days. · Drink plenty of liquids in the next few days. · Seek help if you need it to stop drinking. Getting counseling and joining a support group can help you stay sober. Try a support group such as Alcoholics Anonymous. · Avoid alcohol when you take medicines. It can react with many medicines and cause serious problems. When should you call for help? Call 911 anytime you think you may need emergency care.  For example, call if:  ? · You feel confused and are seeing things that are not there. ? · You are thinking about killing yourself or hurting others. ? · You have a seizure. ? · You vomit blood or what looks like coffee grounds. ?Call your doctor now or seek immediate medical care if:  ? · You have trembling, restlessness, sweating, and other withdrawal symptoms that are new or that get worse. ? · Your withdrawal symptoms come back after not bothering you for days or weeks. ? · You can't stop vomiting. ? Watch closely for changes in your health, and be sure to contact your doctor if:  ? · You need help to stop drinking. Where can you learn more? Go to http://allie-francine.info/. Enter T102 in the search box to learn more about \"Acute Alcohol Intoxication: Care Instructions. \"  Current as of: November 3, 2016  Content Version: 11.4  © 1431-9947 [employer]. Care instructions adapted under license by [employer] (which disclaims liability or warranty for this information). If you have questions about a medical condition or this instruction, always ask your healthcare professional. [address] any warranty or liability for your use of this information. DISCHARGE SUMMARY from Nurse    PATIENT INSTRUCTIONS:    After general anesthesia or intravenous sedation, for 24 hours or while taking prescription Narcotics:  · Limit your activities  · Do not drive and operate hazardous machinery  · Do not make important personal or business decisions  · Do  not drink alcoholic beverages  · If you have not urinated within 8 hours after discharge, please contact your surgeon on call.     Report the following to your surgeon:  · Excessive pain, swelling, redness or odor of or around the surgical area  · Temperature over 100.5  · Nausea and vomiting lasting longer than 4 hours or if unable to take medications  · Any signs of decreased circulation or nerve impairment to extremity: change in color, persistent numbness, tingling, coldness or increase pain  · Any questions    What to do at Home:  Recommended activity: Activity as tolerated,     If you experience any of the following symptoms chest pain, shortness of breath, please follow up with your provider. *  Please give a list of your current medications to your Primary Care Provider. *  Please update this list whenever your medications are discontinued, doses are      changed, or new medications (including over-the-counter products) are added. *  Please carry medication information at all times in case of emergency situations. These are general instructions for a healthy lifestyle:    No smoking/ No tobacco products/ Avoid exposure to second hand smoke  Surgeon General's Warning:  Quitting smoking now greatly reduces serious risk to your health. Obesity, smoking, and sedentary lifestyle greatly increases your risk for illness    A healthy diet, regular physical exercise & weight monitoring are important for maintaining a healthy lifestyle    You may be retaining fluid if you have a history of heart failure or if you experience any of the following symptoms:  Weight gain of 3 pounds or more overnight or 5 pounds in a week, increased swelling in our hands or feet or shortness of breath while lying flat in bed. Please call your doctor as soon as you notice any of these symptoms; do not wait until your next office visit. Recognize signs and symptoms of STROKE:    F-face looks uneven    A-arms unable to move or move unevenly    S-speech slurred or non-existent    T-time-call 911 as soon as signs and symptoms begin-DO NOT go       Back to bed or wait to see if you get better-TIME IS BRAIN. Warning Signs of HEART ATTACK     Call 911 if you have these symptoms:   Chest discomfort. Most heart attacks involve discomfort in the center of the chest that lasts more than a few minutes, or that goes away and comes back.  It can feel like uncomfortable pressure, squeezing, fullness, or pain.  Discomfort in other areas of the upper body. Symptoms can include pain or discomfort in one or both arms, the back, neck, jaw, or stomach.  Shortness of breath with or without chest discomfort.  Other signs may include breaking out in a cold sweat, nausea, or lightheadedness. Don't wait more than five minutes to call 911 - MINUTES MATTER! Fast action can save your life. Calling 911 is almost always the fastest way to get lifesaving treatment. Emergency Medical Services staff can begin treatment when they arrive -- up to an hour sooner than if someone gets to the hospital by car. The discharge information has been reviewed with the patient. The patient verbalized understanding. Discharge medications reviewed with the patient and appropriate educational materials and side effects teaching were provided.   ___________________________________________________________________________________________________________________________________

## 2017-10-26 NOTE — PROGRESS NOTES
NUTRITION    Nutrition Screen      RECOMMENDATIONS / PLAN:     - Change diet to double portions. - Continue RD inpatient monitoring and evaluation. NUTRITION INTERVENTIONS & DIAGNOSIS:     [x] Meals/Snacks: modified diet    Nutrition Diagnosis: Unintentional weight loss related to inadequate energy intake as evidenced by 9 lb, 7% weight loss x 2 weeks. ASSESSMENT:     Appetite and meal intake improved today, pt still hungry after consuming 100% of lunch. Reported weight loss over the past several weeks. Denies nausea/vomiting or constipation/diarrhea at present. Electrolytes replaced.      Average po intake adequate to meet patients estimated nutritional needs:   [x] Yes     [] No   [] Unable to determine at this time    Diet: DIET CARDIAC Regular      Food Allergies: NKFA  Current Appetite:   [x] Good     [] Fair     [] Poor     [] Other:  Appetite/meal intake prior to admission:   [x] Good     [] Fair     [] Poor     [x] Other: little to no po x 1-2 days PTA 2/2 nausea, lethargy from excess alcohol consumption; good appetite/meal intake prior to that  Feeding Limitations:  [] Swallowing difficulty    [] Chewing difficulty    [] Other:  Current Meal Intake: Patient Vitals for the past 100 hrs:   % Diet Eaten   10/26/17 1312 100 %   10/25/17 1740 75 %     BM: 10/24   Skin Integrity: WDL  Edema: none  Pertinent Medications: Reviewed: NS at 100 mL/hr, neutra-phos, theragran, thiamine, folic acid, KCl     Recent Labs      10/26/17   0300  10/25/17   0229  10/24/17   2057   NA  139  141  140   K  3.4*  3.8  3.8   CL  107  109*  108   CO2  25  22  20*   GLU  101*  70*  70*   BUN  11  16  16   CREA  0.57*  0.58*  0.67   CA  7.7*  7.5*  8.2*   MG  1.9   --    --    PHOS  1.7*   --    --    ALB  2.9*  3.5  4.1   SGOT  41*  49*  57*   ALT  23  30  35       Intake/Output Summary (Last 24 hours) at 10/26/17 1457  Last data filed at 10/26/17 1312   Gross per 24 hour   Intake              580 ml   Output 0 ml   Net              580 ml       Anthropometrics:  Ht Readings from Last 1 Encounters:   10/26/17 5' 4\" (1.626 m)     Last 3 Recorded Weights in this Encounter    10/25/17 0050 10/25/17 0351 10/26/17 0426   Weight: 53.3 kg (117 lb 8 oz) 53.3 kg (117 lb 8 oz) 53 kg (116 lb 12.8 oz)     Body mass index is 20.05 kg/(m^2). Weight History: patient reports previous weight of 125 lb and 9 lb, 7% weight loss x 2 weeks PTA    Weight Metrics 10/26/2017 4/12/2017 10/5/2012 9/29/2012   Weight 116 lb 12.8 oz 126 lb 117 lb 117 lb 4 oz   BMI 20.05 kg/m2 21.63 kg/m2 18.89 kg/m2 20.12 kg/m2        Admitting Diagnosis: Syncope  Elevated troponin  QT prolongation  Cocaine abuse  Alcohol abuse  NSTEMI (non-ST elevated myocardial infarction) [facility])  Pertinent PMHx: alcohol abuse    Education Needs:        [x] None identified  [] Identified - Not appropriate at this time  []  Identified and addressed - refer to education log  Learning Limitations:   [x] None identified  [] Identified    Cultural, Orthodoxy & ethnic food preferences:  [x] None identified    [] Identified and addressed     ESTIMATED NUTRITION NEEDS:     Calories: 2013-1909 kcal (MSJx1.4-1.5) based on  [x] Actual BW  53 kg    [] IBW   Protein: 42-53 gm (0.8-1 gm/kg) based on  [x] Actual BW      [] IBW   Fluid: 1 mL/kcal     MONITORING & EVALUATION:     Nutrition Goal(s):   1. Po intake of meals will meet >75% of patient estimated nutritional needs within the next 7 days.   Outcome:  [] Met/Ongoing    []  Not Met    [x] New/Initial Goal     Monitoring:   [x] Diet tolerance   [x] Meal intake   [] Supplement intake   [] GI symptoms/ability to tolerate po diet   [] Respiratory status   [] Plan of care      Previous Recommendations (for follow-up assessments only):     []   Implemented       []   Not Implemented (RD to address)     [] No Recommendation Made     Discharge Planning: cardiac diet   [x] Participated in care planning, discharge planning, & interdisciplinary rounds as appropriate      Ganesh Sutherland, [address] [place] [address], [address] [place]    Pager: [phone]

## 2017-10-26 NOTE — PROGRESS NOTES
[facility]      CARDIOLOGY PROGRESS NOTE  RECS:      1. ACS-stable no chest [ain no chest pressure no SOB. Continue with Lovenox  SQ bid, asa and statin. Follow NUC stress test to assess CAD. Follow echo report. 2. Hypertension- low normal now.  will hold BP medications and monitor for now. 3. QTc prolongation- from Cocaine and alcohol use. resolved   4. Alcohol abuse-Patient advised to take alcohol in moderation to avoid future cardiac and liver problems including arrhythmias, cardiomyopathy and congestive heart failure. 5. Paroxymal Atrial Fib? - short run. Maintaining NSR  6. Tobacco and Cocaine abuse.       No ischemia on stress test.  Echo images reviewed- low normal EF 45%.  Will consider BB as outpatient- as she used cocaine prior to admission  Will add low dose norvasc for htn  Will f/u in clinic      ASSESSMENT:  Hospital Problems  Never Reviewed          Codes Class Noted POA    Alcohol abuse ICD-10-CM: F10.10  ICD-9-CM: 305.00  10/24/2017 Unknown        Syncope ICD-10-CM: R55  ICD-9-CM: 780.2  10/24/2017 Unknown        Cocaine abuse ICD-10-CM: F14.10  ICD-9-CM: 305.60  10/24/2017 Unknown        Elevated troponin ICD-10-CM: R74.8  ICD-9-CM: 790.6  10/24/2017 Unknown        QT prolongation ICD-10-CM: R94.31  ICD-9-CM: 794.31  10/24/2017 Unknown        NSTEMI (non-ST elevated myocardial infarction) [facility]) ICD-10-CM: I21.4  ICD-9-CM: 410.70  10/24/2017 Unknown                SUBJECTIVE:    No CP  No SOB    OBJECTIVE:    VS:   Visit Vitals    /73 (BP 1 Location: Right arm, BP Patient Position: At rest)    Pulse 70    Temp 97.6 °F (36.4 °C)    Resp 16    Wt 53 kg (116 lb 12.8 oz)    SpO2 99%    Breastfeeding No    BMI 20.05 kg/m2         Intake/Output Summary (Last 24 hours) at 10/26/17 1017  Last data filed at 10/25/17 1740   Gross per 24 hour   Intake              240 ml   Output                0 ml   Net              240 ml     TELE: normal sinus rhythm    General: alert, well developed, pleasant and in no apparent distress  HENT: Normocephalic, atraumatic. Normal external eye. Neck :  no bruit, no JVD  Cardiac:  regular rate and rhythm, S1, S2 normal, no S3 or S4, no click, no rub  Lungs: clear to auscultation bilaterally  Abdomen: Soft, nontender, no masses  Extremities:  No c/c/e, peripheral pulses present      Labs: Results:       Chemistry Recent Labs      10/26/17   0300  10/25/17   0229  10/24/17   2057   GLU  101*  70*  70*   NA  139  141  140   K  3.4*  3.8  3.8   CL  107  109*  108   CO2  25  22  20*   BUN  11  16  16   CREA  0.57*  0.58*  0.67   CA  7.7*  7.5*  8.2*   AGAP  7  10  12   BUCR  19  28*  24*   AP  63  66  78   TP  5.7*  7.2  8.5*   ALB  2.9*  3.5  4.1   GLOB  2.8  3.7  4.4*   AGRAT  1.0  0.9  0.9      CBC w/Diff Recent Labs      10/26/17   0300  10/25/17   0229  10/24/17   1846   WBC  3.8*  7.3  10.2   RBC  3.15*  3.27*  3.85*   HGB  10.8*  11.3*  13.6   HCT  31.6*  33.7*  40.1   PLT  221  245  265   GRANS  47   --   95*   LYMPH  47   --   4*   EOS  1   --   0      Cardiac Enzymes Recent Labs      10/24/17   2057   CPK  177   CKND1  1.9      Coagulation No results for input(s): PTP, INR, APTT in the last 72 hours. No lab exists for component: INREXT    Lipid Panel Lab Results   Component Value Date/Time    Cholesterol, total 166 10/25/2017 02:29 AM    HDL Cholesterol 83 10/25/2017 02:29 AM    LDL, calculated 76 10/25/2017 02:29 AM    VLDL, calculated 7 10/25/2017 02:29 AM    Triglyceride 35 10/25/2017 02:29 AM    CHOL/HDL Ratio 2.0 10/25/2017 02:29 AM      BNP No results for input(s): BNPP in the last 72 hours. Liver Enzymes Recent Labs      10/26/17   0300   TP  5.7*   ALB  2.9*   AP  63   SGOT  41*      Thyroid Studies No results found for: T4, T3U, TSH, TSHEXT           Maura Gramajo Gretchen:[phone] supervised    I have independently evaluated and examined the patient. All relevant labs and testing data's are reviewed.   Care plan discussed and updated after review.     Keesha Ventura MD

## 2017-10-26 NOTE — ROUTINE PROCESS
Patient discharge for home, prescriptions and pharmacy voucher given. Discharge instructions as well as application for [facility].

## 2017-10-26 NOTE — DISCHARGE SUMMARY
[facility]  Discharge Summary       Patient: Justino Xavier Age: 46 y.o. : 1965 MR#: [id number] SSN: [id number]  PCP on record: None  Admit date: 10/24/2017  Discharge date: 10/26/2017    Disposition:    [x]Home   []Home with Home Health   []SNF/NH   []Rehab   []Home with family   []Alternate Facility:____________________    Discharge Diagnoses:                             1.  NSTEMI  2. Hypertension  3. Alcohol abuse  4. Cocaine abuse   5. QT prolongation  6. Recent weight loss    Discharge Medications:     Current Discharge Medication List      START taking these medications    Details   amLODIPine (NORVASC) 5 mg tablet Take 1 Tab by mouth daily. Indications: hypertension  Qty: 30 Tab, Refills: 0      aspirin delayed-release 81 mg tablet Take 1 Tab by mouth daily. Qty: 30 Tab, Refills: 0      atorvastatin (LIPITOR) 20 mg tablet Take 1 Tab by mouth nightly. Qty: 30 Tab, Refills: 0      folic acid (FOLVITE) 1 mg tablet Take 1 Tab by mouth daily. Qty: 30 Tab, Refills: 0      therapeutic multivitamin (THERAGRAN) tablet Take 1 Tab by mouth daily. Qty: 30 Tab, Refills: 0      thiamine (B-1) 100 mg tablet Take 1 Tab by mouth daily. Qty: 30 Tab, Refills: 0         CONTINUE these medications which have NOT CHANGED    Details   naproxen (NAPROSYN) 375 mg tablet Take 1 Tab by mouth two (2) times daily (with meals). Qty: 20 Tab, Refills: 0      promethazine (PHENERGAN) 25 mg tablet Take 1 Tab by mouth every six (6) hours as needed.  Indications: Nausea/Vomiting  Qty: 12 Tab, Refills: 0           Consults:    - Cardiology    Procedures:  -   [facility] [address] MEDICINE CARDIAC STRESS     Name:  Lisa Huston  MR#:  [id number]  :  1965  Account #:  [de-identified]  Date of Adm:  10/24/2017  Date of Service:  10/26/2017        LEXISCAN REPORT     ORDERING PHYSICIAN: Shannan Villasenor MD     INTERPRETING PHYSICIAN: Shannan Villasenor MD     INDICATION: Acute coronary syndrome.     BASELINE DATA: Baseline EKG revealed sinus bradycardia with a  heart rate of 63 beats a minute. Possible old anteroseptal infarct. Baseline blood pressure was 138/102 mmHg. Following this, 0.4 mg of  IV Lexiscan was injected over a 30-second period. Heart rate  increased to a maximum of 108 beats a minute. Blood pressure  dropped to 124/91 mmHg. The patient did not report chest pain or  trouble breathing. No ventricular arrhythmias were noted. No ischemic  ST-T changes were noted. Test was stopped due to completion of  protocol.     INTERPRETATION: Negative EKG portion for ischemia.     Nuclear imaging report to follow.     NUCLEAR IMAGING REPORT: Following IV Lexiscan, 32.2 mCi  of sestamibi was injected. Stress images were obtained. Rest images  were obtained using 10.5 mCi of sestamibi. Images were compared.     FINDINGS  1. Post-stress images in short axis, horizontal, and vertical long axis  reveal normal isotope redistribution in all myocardial segments. 2. Resting images show normal isotope uptake in all areas. 3. Left ventricular ejection fraction is 53% with normal wall motion  and cavity size.     CONCLUSION  1. No evidence of ischemia or infarction based on the SPECT imaging. 2. Normal left ventricular function with normal cavity size and wall  motion. 3. Low-risk scan. Significant Diagnostic Studies:   XR CHEST PORT on 10/24/2017:  IMPRESSION:     1. No clearly acute findings. Bibasilar streaky densities, atelectasis versus  infiltrate. Mildly enlarged cardiac silhouette. Hospital Course by Problem   1. NSTEMI - At time of admission patient found to have ST elevation on EKG and elevated troponins in setting of recent cocaine use. Patient subsequently underwent a nuclear stress test without evidence of ischemia. At time of discharge encouraged continuation of aspirin, statin and norvasc as prescribed.   No beta blockade provided given recent cocaine abuse. 2. Hypertension - Intermittent elevation during admission, patient not on any blood pressure medication prior to admission. Low dose norvasc added with recommendations to continue following in outpatient setting. 3.  Alcohol abuse - Patient showed no signs of alcohol withdrawal during admission and was monitored and covered per CIWA protocol and lithium taper. Counseled patient on limitation and ideally cessation and potential consequences of continued alcohol use. Patient verbalizes understanding. 4.  Cocaine abuse - Counseled on cessation of which patient states she recognizes the importance of cessation with this incident. Discussed consequences with continued use cardiac arrest and death. 5.  QT prolongation - Initial prolongation in setting of cocaine use; improved on recheck EKG. 6.  Recent weight loss - Patient reports unintentional weight loss of at least 10 pounds in the last couple of months. Needs update on routine monitoring including mammogram and colonoscopy. Appears patient has been without recent medical care. *Patient was provided with scripts for medication and vouchers as self pay. Was also provided with generic / low cost \"$4\" list for [facility] as well. Patient would benefit from advanced medical directive completion. Today's examination of the patient revealed:     Subjective:   Feels well, no chest pain, shortness of breath, diaphoresis.   Objective:   VS:   Visit Vitals    /79 (BP 1 Location: Right arm, BP Patient Position: At rest;Lying left side)    Pulse 68    Temp 98.3 °F (36.8 °C)    Resp 18    Ht 5' 4\" (1.626 m)    Wt 53 kg (116 lb 12.8 oz)    SpO2 98%    Breastfeeding No    BMI 20.05 kg/m2      Tmax/24hrs: Temp (24hrs), Av.9 °F (36.6 °C), Min:97 °F (36.1 °C), Max:98.7 °F (37.1 °C)     Input/Output:   Intake/Output Summary (Last 24 hours) at 10/26/17 1037  Last data filed at 10/26/17 1312   Gross per 24 hour   Intake 580 ml   Output                0 ml   Net              580 ml       General:  Alert, NAD  Cardiovascular:  RRR  Pulmonary:  LSC throughout; respiratory effort WNL  GI:  +BS in all four quadrants, soft, non-tender  Extremities:  No edema; 2+ dorsalis pedis pulses bilaterally  Neuro: oriented x 4    Labs:    Recent Results (from the past 24 hour(s))   CBC WITH AUTOMATED DIFF    Collection Time: 10/26/17  3:00 AM   Result Value Ref Range    WBC 3.8 (L) 4.6 - 13.2 K/uL    RBC 3.15 (L) 4.20 - 5.30 M/uL    HGB 10.8 (L) 12.0 - 16.0 g/dL    HCT 31.6 (L) 35.0 - 45.0 %    .3 (H) 74.0 - 97.0 FL    MCH 34.3 (H) 24.0 - 34.0 PG    MCHC 34.2 31.0 - 37.0 g/dL    RDW 13.0 11.6 - 14.5 %    PLATELET 768 397 - 734 K/uL    MPV 9.3 9.2 - 11.8 FL    NEUTROPHILS 47 40 - 73 %    LYMPHOCYTES 47 21 - 52 %    MONOCYTES 5 3 - 10 %    EOSINOPHILS 1 0 - 5 %    BASOPHILS 0 0 - 2 %    ABS. NEUTROPHILS 1.8 1.8 - 8.0 K/UL    ABS. LYMPHOCYTES 1.8 0.9 - 3.6 K/UL    ABS. MONOCYTES 0.2 0.05 - 1.2 K/UL    ABS. EOSINOPHILS 0.0 0.0 - 0.4 K/UL    ABS. BASOPHILS 0.0 0.0 - 0.1 K/UL    DF AUTOMATED     HEPATIC FUNCTION PANEL    Collection Time: 10/26/17  3:00 AM   Result Value Ref Range    Protein, total 5.7 (L) 6.4 - 8.2 g/dL    Albumin 2.9 (L) 3.4 - 5.0 g/dL    Globulin 2.8 2.0 - 4.0 g/dL    A-G Ratio 1.0 0.8 - 1.7      Bilirubin, total 0.3 0.2 - 1.0 MG/DL    Bilirubin, direct 0.1 0.0 - 0.2 MG/DL    Alk.  phosphatase 63 45 - 117 U/L    AST (SGOT) 41 (H) 15 - 37 U/L    ALT (SGPT) 23 13 - 56 U/L   MAGNESIUM    Collection Time: 10/26/17  3:00 AM   Result Value Ref Range    Magnesium 1.9 1.6 - 2.6 mg/dL   METABOLIC PANEL, BASIC    Collection Time: 10/26/17  3:00 AM   Result Value Ref Range    Sodium 139 136 - 145 mmol/L    Potassium 3.4 (L) 3.5 - 5.5 mmol/L    Chloride 107 100 - 108 mmol/L    CO2 25 21 - 32 mmol/L    Anion gap 7 3.0 - 18 mmol/L    Glucose 101 (H) 74 - 99 mg/dL    BUN 11 7.0 - 18 MG/DL    Creatinine 0.57 (L) 0.6 - 1.3 MG/DL BUN/Creatinine ratio 19 12 - 20      GFR est AA >60 >60 ml/min/1.73m2    GFR est non-AA >60 >60 ml/min/1.73m2    Calcium 7.7 (L) 8.5 - 10.1 MG/DL   PHOSPHORUS    Collection Time: 10/26/17  3:00 AM   Result Value Ref Range    Phosphorus 1.7 (L) 2.5 - 4.9 MG/DL   NUCLEAR STRESS TEST    Collection Time: 10/26/17  9:39 AM   Result Value Ref Range    Diagnosis      Test indication ACS     Functional capacity      ECG Interp. Before Exercise      ECG Interp. During Exercise      Overall HR response to exercise      Overall BP response to exercise      Max. Systolic  mmHg    Max. Diastolic  mmHg    Max.  Heart rate 108 BPM    [facility] treadmill score      Reddy TM score result      Peak Ex METs 1.0 METS    Protocol name Nancy Najera         Known cardiac condition      Attending physician       Additional Data Reviewed:     Condition:   Follow-up Appointments:   Dr. Naif Blackburn in 2-3 weeks  Dr. Sunday Morales / Scar Duran provider in 1 week    >30 minutes spent coordinating this discharge (review instructions/follow-up, prescriptions, preparing report for sign off)    Signed:  Sri Hernández NP  10/26/2017  5:31 PM

## 2017-10-26 NOTE — PROGRESS NOTES
Patient returned from doing stress test BP noted to be elevated at 142/120, hospitalist informed about same who states that patient should be monitored for now

## 2017-10-26 NOTE — ROUTINE PROCESS
Bedside shift change report given to Levi Huang RN (oncoming nurse) by Juju Walter (offgoing nurse). Report included the following information SBAR, Kardex, ED Summary, OR Summary, Procedure Summary, Intake/Output, MAR, Accordion and Recent Results.

## 2017-10-27 ENCOUNTER — TELEPHONE (OUTPATIENT)
Dept: OTHER | Age: 52
End: 2017-10-27

## 2017-10-27 ENCOUNTER — TELEPHONE (OUTPATIENT)
Dept: CARDIOLOGY CLINIC | Age: 52
End: 2017-10-27

## 2017-10-27 NOTE — TELEPHONE ENCOUNTER
----- Message from Darylene Albee, NP sent at 10/27/2017  1:42 PM EDT -----  Regarding: Norvasc  Please start norvasc 2.5 mg daily. Ok to call medication.

## 2017-10-27 NOTE — TELEPHONE ENCOUNTER
Patient has prescription for amlodipine given to her from [facility]. She has misplaced vouchers allowing her to get her medication from the pharmacy that she was given from the [facility] at discharge and is not able to pay out of pocket cost for any of her prescriptions. Patient is not able to go to [place] office to be seen for earlier appt.  Scheduled patient f/u for first available in [place] on Dec .

## 2017-10-27 NOTE — PROCEDURES
[address] STRESS    Name:  Jonny Cadena  MR#:  [id number]  :  1965  Account #:  [de-identified]  Date of Adm:  10/24/2017  Date of Service:  10/26/2017      Kristy Sher REPORT    ORDERING PHYSICIAN: Dennis Calles MD    INTERPRETING PHYSICIAN: Dennis Calles MD    INDICATION: Acute coronary syndrome. BASELINE DATA: Baseline EKG revealed sinus bradycardia with a  heart rate of 63 beats a minute. Possible old anteroseptal infarct. Baseline blood pressure was 138/102 mmHg. Following this, 0.4 mg of  IV Lexiscan was injected over a 30-second period. Heart rate  increased to a maximum of 108 beats a minute. Blood pressure  dropped to 124/91 mmHg. The patient did not report chest pain or  trouble breathing. No ventricular arrhythmias were noted. No ischemic  ST-T changes were noted. Test was stopped due to completion of  protocol. INTERPRETATION: Negative EKG portion for ischemia. Nuclear imaging report to follow. NUCLEAR IMAGING REPORT: Following IV Lexiscan, 32.2 mCi  of sestamibi was injected. Stress images were obtained. Rest images  were obtained using 10.5 mCi of sestamibi. Images were compared. FINDINGS  1. Post-stress images in short axis, horizontal, and vertical long axis  reveal normal isotope redistribution in all myocardial segments. 2. Resting images show normal isotope uptake in all areas. 3. Left ventricular ejection fraction is 53% with normal wall motion  and cavity size. CONCLUSION  1. No evidence of ischemia or infarction based on the SPECT imaging. 2. Normal left ventricular function with normal cavity size and wall  motion. 3. Low-risk scan.         MD DERIAN Ring / BRADEN  D:  10/26/2017   16:02  T:  10/27/2017   07:01  Job #:  [id number]

## 2017-12-21 ENCOUNTER — HOSPITAL ENCOUNTER (EMERGENCY)
Age: 52
Discharge: HOME | End: 2017-12-21
Attending: EMERGENCY MEDICINE | Admitting: EMERGENCY MEDICINE
Payer: SELF-PAY

## 2017-12-21 VITALS
SYSTOLIC BLOOD PRESSURE: 138 MMHG | HEIGHT: 64 IN | WEIGHT: 129.2 LBS | OXYGEN SATURATION: 100 % | DIASTOLIC BLOOD PRESSURE: 101 MMHG | TEMPERATURE: 98 F | HEART RATE: 92 BPM | BODY MASS INDEX: 22.06 KG/M2 | RESPIRATION RATE: 20 BRPM

## 2017-12-21 DIAGNOSIS — J06.9 ACUTE UPPER RESPIRATORY INFECTION: Primary | ICD-10-CM

## 2017-12-21 PROCEDURE — 99282 EMERGENCY DEPT VISIT SF MDM: CPT

## 2017-12-21 RX ORDER — CODEINE PHOSPHATE AND GUAIFENESIN 10; 100 MG/5ML; MG/5ML
5 SOLUTION ORAL
Qty: 118 ML | Refills: 0 | Status: SHIPPED | OUTPATIENT
Start: 2017-12-21 | End: 2017-12-26

## 2017-12-21 RX ORDER — AZITHROMYCIN 250 MG/1
TABLET, FILM COATED ORAL
Qty: 6 TAB | Refills: 0 | Status: SHIPPED | OUTPATIENT
Start: 2017-12-21 | End: 2021-07-31

## 2017-12-21 NOTE — ED PROVIDER NOTES
Patient is a 45 y.o. female presenting with cough. The history is provided by the patient. Cough   This is a new problem. Episode onset: About 1 week ago, worse since Saturday. The problem occurs constantly. The problem has been gradually worsening. The cough is productive of purulent sputum. Patient reports a subjective fever - was not measured. The fever has been present for 3 - 4 days. Associated symptoms include chills, sweats, headaches, rhinorrhea, sore throat and myalgias. Pertinent negatives include no chest pain, no weight loss, no eye redness, no ear congestion, no ear pain, no shortness of breath, no wheezing, no nausea, no vomiting and no confusion. She has tried cough syrup (tylenol, motrin) for the symptoms. The treatment provided mild relief. She is not a smoker. Her past medical history does not include pneumonia, COPD, asthma or CHF. History reviewed. No pertinent past medical history. History reviewed. No pertinent surgical history. History reviewed. No pertinent family history. Social History     Social History    Marital status: SINGLE     Spouse name: N/A    Number of children: N/A    Years of education: N/A     Occupational History    Not on file. Social History Main Topics    Smoking status: Never Smoker    Smokeless tobacco: Never Used    Alcohol use Yes      Comment: occational beer    Drug use: No    Sexual activity: Not on file     Other Topics Concern    Not on file     Social History Narrative         ALLERGIES: Review of patient's allergies indicates no known allergies. Review of Systems   Constitutional: Positive for chills. Negative for fever and weight loss. HENT: Positive for congestion, rhinorrhea and sore throat. Negative for ear pain, sinus pain, sinus pressure, sneezing, trouble swallowing and voice change. Eyes: Negative for pain and redness. Respiratory: Positive for cough. Negative for shortness of breath, wheezing and stridor. Cardiovascular: Negative for chest pain, palpitations and leg swelling. Gastrointestinal: Negative for abdominal pain, constipation, diarrhea, nausea and vomiting. Endocrine: Negative. Genitourinary: Negative for dysuria. Musculoskeletal: Positive for myalgias. Negative for arthralgias, back pain, gait problem, joint swelling, neck pain and neck stiffness. Skin: Negative. Neurological: Positive for headaches. Negative for dizziness, tremors, syncope, weakness, light-headedness and numbness. Hematological: Negative. Psychiatric/Behavioral: Negative. Negative for confusion. Vitals:    12/21/17 1037   BP: (!) 138/101   Pulse: 92   Resp: 20   Temp: 98 °F (36.7 °C)   SpO2: 100%   Weight: 58.6 kg (129 lb 3.2 oz)   Height: 5' 4\" (1.626 m)            Physical Exam   Constitutional: She is oriented to person, place, and time. She appears well-developed and well-nourished. She is active and cooperative. Non-toxic appearance. She does not have a sickly appearance. She does not appear ill. No distress. HENT:   Head: Normocephalic and atraumatic. Right Ear: Tympanic membrane, external ear and ear canal normal.   Left Ear: Tympanic membrane, external ear and ear canal normal.   Nose: Rhinorrhea present. Mouth/Throat: Uvula is midline and mucous membranes are normal. Posterior oropharyngeal erythema present. No oropharyngeal exudate, posterior oropharyngeal edema or tonsillar abscesses. Eyes: Conjunctivae and EOM are normal. Pupils are equal, round, and reactive to light. Neck: Normal range of motion. Neck supple. Cardiovascular: Normal rate, regular rhythm, normal heart sounds and intact distal pulses. Exam reveals no gallop and no friction rub. No murmur heard. Pulmonary/Chest: Effort normal and breath sounds normal. No accessory muscle usage. No respiratory distress. She has no decreased breath sounds. She has no wheezes. She has no rhonchi. She has no rales. Abdominal: Soft. Bowel sounds are normal. There is no tenderness. Musculoskeletal: Normal range of motion. She exhibits no edema or tenderness. Lymphadenopathy:     She has no cervical adenopathy. Neurological: She is alert and oriented to person, place, and time. Coordination normal.   Skin: Skin is warm and dry. She is not diaphoretic. Psychiatric: She has a normal mood and affect. Her behavior is normal. Judgment and thought content normal.   Nursing note and vitals reviewed. MDM  Number of Diagnoses or Management Options  Acute upper respiratory infection: new and requires workup  Diagnosis management comments: DDx:  viral vs bacterial URI, influenza, asthma exacerbation, Bronchiolitis, bronchitis, pneumonia, pharyngitis, epiglottitis, allergies, pneumothorax, costochondritis/chest wall pain, pericarditis, trauma (cardiac contusion, rib Fx, etc), pleuritic chest pain, pleural effusion, intraabdominal process    IMPRESSION AND MEDICAL DECISION MAKING:  Based upon the patient's presentation with noted HPI and PE, along with the work up done in the emergency department, I believe that the patient is having a prolonged URI outside the normal time range of viral URI, yet no signs of bronchitis nor pneumonia. Given prolonged time course, will cover with antibiotics. DIAGNOSIS:  1. Acute upper respiratory infection. SPECIFIC PATIENT INSTRUCTIONS FROM THE PHYSICIAN WHO TREATED YOU IN THE ER TODAY:  1. Return if any concerns or worsening of condition(s)  2. Zithromax as prescribed until finished. 3. Robitussin DM (over the counter) syrup during the day for cough. Use the prescribed Robitussin AC for cough at night. 4. Over the counter Tylenol for aches and pains and if fever develops. 5. FOLLOW UP APPOINTMENT:  Your primary doctor in 2-3 days. Pt results have been reviewed with them. They have been counseled regarding diagnosis, treatment, and plan.  Pt verbally conveys understanding and agreement of the signs, symptoms, diagnosis, treatment and prognosis and additionally agrees to follow up as discussed. Pt also agrees with the care-plan and conveys that all of their questions have been answered. I have also provided discharge instructions for them that include: educational information regarding their diagnosis and treatment, and list of reasons why they would want to return to the ED prior to their follow-up appointment, should their condition change. Layne Clemons PA-C 12:29 PM        Amount and/or Complexity of Data Reviewed  Review and summarize past medical records: yes  Discuss the patient with other providers: yes    Risk of Complications, Morbidity, and/or Mortality  Presenting problems: low  Diagnostic procedures: low  Management options: low    Patient Progress  Patient progress: stable    ED Course       Procedures      Diagnosis:   1. Acute upper respiratory infection          Disposition: Discharge to home. Follow-up Information     Follow up With Details Comments Contact Info    [facility]  As needed, If symptoms worsen [address] [facility] [address] [facility]    Sabine Sow MD Go in 2 days  [address] [address]  [phone]            Patient's Medications   Start Taking    AZITHROMYCIN (ZITHROMAX Z-SELVIN) 250 MG TABLET    Take two tablets the first day and then one every day thereafter until completion    GUAIFENESIN-CODEINE (ROBITUSSIN AC) 100-10 MG/5 ML SOLUTION    Take 5 mL by mouth three (3) times daily as needed for Cough or Congestion for up to 5 days. Max Daily Amount: 15 mL. Indications: Cough   Continue Taking    AMLODIPINE (NORVASC) 5 MG TABLET    Take 0.5 Tabs by mouth daily. Indications: hypertension    ASPIRIN DELAYED-RELEASE 81 MG TABLET    Take 1 Tab by mouth daily. ATORVASTATIN (LIPITOR) 20 MG TABLET    Take 1 Tab by mouth nightly. DOCUSATE SODIUM (COLACE) 100 MG CAPSULE    Take 1 Cap by mouth two (2) times a day for 90 days. DOCUSATE SODIUM (COLACE) 100 MG CAPSULE    Take 1 capsule twice daily    FOLIC ACID (FOLVITE) 1 MG TABLET    Take 1 Tab by mouth daily. PROMETHAZINE (PHENERGAN) 25 MG TABLET    Take 1 Tab by mouth every six (6) hours as needed. Indications: Nausea/Vomiting    THERAPEUTIC MULTIVITAMIN (THERAGRAN) TABLET    Take 1 Tab by mouth daily. THIAMINE (B-1) 100 MG TABLET    Take 1 Tab by mouth daily.    These Medications have changed    No medications on file   Stop Taking    No medications on file

## 2017-12-21 NOTE — DISCHARGE INSTRUCTIONS
Saline Nasal Washes: Care Instructions  Your Care Instructions  Saline nasal washes help keep the nasal passages open by washing out thick or dried mucus. This simple remedy can help relieve symptoms of allergies, sinusitis, and colds. It also can make the nose feel more comfortable by keeping the mucous membranes moist. You may notice a little burning sensation in your nose the first few times you use the solution, but this usually gets better in a few days. Follow-up care is a key part of your treatment and safety. Be sure to make and go to all appointments, and call your doctor if you are having problems. It's also a good idea to know your test results and keep a list of the medicines you take. How can you care for yourself at home? · You can buy premixed saline solution in a squeeze bottle or other sinus rinse products at a drugstore. Read and follow the instructions on the label. · You also can make your own saline solution by adding 1 teaspoon of salt and 1 teaspoon of baking soda to 2 cups of distilled water. · If you use a homemade solution, pour a small amount into a clean bowl. Using a rubber bulb syringe, squeeze the syringe and place the tip in the salt water. Pull a small amount of the salt water into the syringe by relaxing your hand. · Sit down with your head tilted slightly back. Do not lie down. Put the tip of the bulb syringe or the squeeze bottle a little way into one of your nostrils. Gently drip or squirt a few drops into the nostril. Repeat with the other nostril. Some sneezing and gagging are normal at first.  · Gently blow your nose. · Wipe the syringe or bottle tip clean after each use. · Repeat this 2 or 3 times a day. · Use nasal washes gently if you have nosebleeds often. When should you call for help? Watch closely for changes in your health, and be sure to contact your doctor if:  ? · You often get nosebleeds. ? · You have problems doing the nasal washes.    Where can you learn more? Go to http://allie-francine.info/. Enter 071 [id number] [id number] in the search box to learn more about \"Saline Nasal Washes: Care Instructions. \"  Current as of: May 12, 2017  Content Version: 11.4  © 1217-4810 [employer]. Care instructions adapted under license by [employer] (which disclaims liability or warranty for this information). If you have questions about a medical condition or this instruction, always ask your healthcare professional. [address] any warranty or liability for your use of this information. Upper Respiratory Infection (Cold): Care Instructions  Your Care Instructions    An upper respiratory infection, or URI, is an infection of the nose, sinuses, or throat. URIs are spread by coughs, sneezes, and direct contact. The common cold is the most frequent kind of URI. The flu and sinus infections are other kinds of URIs. Almost all URIs are caused by viruses. Antibiotics won't cure them. But you can treat most infections with home care. This may include drinking lots of fluids and taking over-the-counter pain medicine. You will probably feel better in 4 to 10 days. The doctor has checked you carefully, but problems can develop later. If you notice any problems or new symptoms, get medical treatment right away. Follow-up care is a key part of your treatment and safety. Be sure to make and go to all appointments, and call your doctor if you are having problems. It's also a good idea to know your test results and keep a list of the medicines you take. How can you care for yourself at home? · To prevent dehydration, drink plenty of fluids, enough so that your urine is light yellow or clear like water. Choose water and other caffeine-free clear liquids until you feel better. If you have kidney, heart, or liver disease and have to limit fluids, talk with your doctor before you increase the amount of fluids you drink.   · Take an over-the-counter pain medicine, such as acetaminophen (Tylenol), ibuprofen (Advil, Motrin), or naproxen (Aleve). Read and follow all instructions on the label. · Before you use cough and cold medicines, check the label. These medicines may not be safe for young children or for people with certain health problems. · Be careful when taking over-the-counter cold or flu medicines and Tylenol at the same time. Many of these medicines have acetaminophen, which is Tylenol. Read the labels to make sure that you are not taking more than the recommended dose. Too much acetaminophen (Tylenol) can be harmful. · Get plenty of rest.  · Do not smoke or allow others to smoke around you. If you need help quitting, talk to your doctor about stop-smoking programs and medicines. These can increase your chances of quitting for good. When should you call for help? Call 911 anytime you think you may need emergency care. For example, call if:  ? · You have severe trouble breathing. ?Call your doctor now or seek immediate medical care if:  ? · You seem to be getting much sicker. ? · You have new or worse trouble breathing. ? · You have a new or higher fever. ? · You have a new rash. ? Watch closely for changes in your health, and be sure to contact your doctor if:  ? · You have a new symptom, such as a sore throat, an earache, or sinus pain. ? · You cough more deeply or more often, especially if you notice more mucus or a change in the color of your mucus. ? · You do not get better as expected. Where can you learn more? Go to http://allie-francine.info/. Enter A273 in the search box to learn more about \"Upper Respiratory Infection (Cold): Care Instructions. \"  Current as of: May 12, 2017  Content Version: 11.4  © 5493-0123 [employer], [employer]. Care instructions adapted under license by [employer] (which disclaims liability or warranty for this information).  If you have questions about a medical condition or this instruction, always ask your healthcare professional. [address] [place] [address] any warranty or liability for your use of this information.

## 2018-01-02 NOTE — PROGRESS NOTES
Patient was given 10.54 mCi of Sestamibi for the Resting pictures. Patient received 0.4 mg of Lexiscan for the exercise portion of the Stress test. Patient was then given 32.2 mCi of Sestamibi for the Stress pictures. Patient went back to room with armband still on. TRAUMA

## 2018-03-23 ENCOUNTER — HOSPITAL ENCOUNTER (EMERGENCY)
Age: 53
Discharge: HOME | End: 2018-03-23
Attending: EMERGENCY MEDICINE
Payer: SELF-PAY

## 2018-03-23 ENCOUNTER — APPOINTMENT (OUTPATIENT)
Dept: GENERAL RADIOLOGY | Age: 53
End: 2018-03-23
Attending: EMERGENCY MEDICINE
Payer: SELF-PAY

## 2018-03-23 VITALS
DIASTOLIC BLOOD PRESSURE: 92 MMHG | HEIGHT: 64 IN | BODY MASS INDEX: 22.2 KG/M2 | RESPIRATION RATE: 14 BRPM | HEART RATE: 78 BPM | OXYGEN SATURATION: 98 % | WEIGHT: 130 LBS | SYSTOLIC BLOOD PRESSURE: 136 MMHG | TEMPERATURE: 97.7 F

## 2018-03-23 DIAGNOSIS — T14.8XXA MUSCLE STRAIN: Primary | ICD-10-CM

## 2018-03-23 PROCEDURE — 74011250637 HC RX REV CODE- 250/637: Performed by: EMERGENCY MEDICINE

## 2018-03-23 PROCEDURE — 99283 EMERGENCY DEPT VISIT LOW MDM: CPT

## 2018-03-23 PROCEDURE — 72170 X-RAY EXAM OF PELVIS: CPT

## 2018-03-23 PROCEDURE — 73552 X-RAY EXAM OF FEMUR 2/>: CPT

## 2018-03-23 RX ORDER — NAPROXEN 500 MG/1
500 TABLET ORAL 2 TIMES DAILY WITH MEALS
Qty: 20 TAB | Refills: 0 | Status: SHIPPED | OUTPATIENT
Start: 2018-03-23 | End: 2018-04-02

## 2018-03-23 RX ORDER — IBUPROFEN 400 MG/1
800 TABLET ORAL
Status: COMPLETED | OUTPATIENT
Start: 2018-03-23 | End: 2018-03-23

## 2018-03-23 RX ADMIN — IBUPROFEN 800 MG: 400 TABLET ORAL at 09:55

## 2018-03-23 NOTE — ED PROVIDER NOTES
EMERGENCY DEPARTMENT HISTORY AND PHYSICAL EXAM    9:47 AM      Date: 3/23/2018  Patient Name: Raissa Wolff    History of Presenting Illness     Chief Complaint   Patient presents with    Hip Pain         History Provided By: Patient    Chief Complaint: leg pain  Duration: 2 Days  Timing:  Acute and Worsening  Location: left upper leg  Quality: Aching  Severity: Moderate  Modifying Factors: worse with movement, no relief with Motrin, Aleve  Associated Symptoms: denies any other associated signs or symptoms      Additional History (Context): Raissa Wolff is a 45 y.o. female with myocardial infarction who presents with 2 days of acute onset worsening moderate aching left upper leg pain that is worse with movement and no relief with Motrin or Aleve. Pt reports that the side of her left thigh started to hurt two days ago and then yesterday the pain continued and started to radiate up into her hip/buttocks area. Pt states that walking puts \"pressure\" on it making the pain worse. Pt has taken Motrin, Aleve and a topical cream name of which she does not remember with no relief from the pain. No other concerns or symptoms at this time. PCP: None    Current Outpatient Prescriptions   Medication Sig Dispense Refill    naproxen (NAPROSYN) 500 mg tablet Take 1 Tab by mouth two (2) times daily (with meals) for 10 days. 20 Tab 0    azithromycin (ZITHROMAX Z-SELVIN) 250 mg tablet Take two tablets the first day and then one every day thereafter until completion 6 Tab 0    aspirin delayed-release 81 mg tablet Take 1 Tab by mouth daily. 30 Tab 0    atorvastatin (LIPITOR) 20 mg tablet Take 1 Tab by mouth nightly. 30 Tab 0    folic acid (FOLVITE) 1 mg tablet Take 1 Tab by mouth daily. 30 Tab 0    therapeutic multivitamin (THERAGRAN) tablet Take 1 Tab by mouth daily. 30 Tab 0    thiamine (B-1) 100 mg tablet Take 1 Tab by mouth daily. 30 Tab 0    amLODIPine (NORVASC) 5 mg tablet Take 0.5 Tabs by mouth daily.  Indications: hypertension 30 Tab 0    docusate sodium (COLACE) 100 mg capsule Take 1 capsule twice daily 60 Cap 0    promethazine (PHENERGAN) 25 mg tablet Take 1 Tab by mouth every six (6) hours as needed. Indications: Nausea/Vomiting 12 Tab 0       Past History     Past Medical History:  No past medical history on file. Past Surgical History:  No past surgical history on file. Family History:  No family history on file. Social History:  Social History   Substance Use Topics    Smoking status: Never Smoker    Smokeless tobacco: Never Used    Alcohol use Yes      Comment: occational beer       Allergies:  No Known Allergies      Review of Systems     Review of Systems   Constitutional: Negative for chills and fever. Respiratory: Negative for shortness of breath. Cardiovascular: Negative for chest pain. Gastrointestinal: Negative for diarrhea, nausea and vomiting. Musculoskeletal:        Positive for left upper leg pain   All other systems reviewed and are negative. Physical Exam     Visit Vitals    BP (!) 136/92 (BP 1 Location: Left arm, BP Patient Position: At rest)    Pulse 78    Temp 97.7 °F (36.5 °C)    Resp 14    Ht 5' 4\" (1.626 m)    Wt 59 kg (130 lb)    SpO2 98%    BMI 22.31 kg/m2         Physical Exam   Constitutional: She is oriented to person, place, and time. She appears well-developed and well-nourished. No distress. HENT:   Head: Normocephalic and atraumatic. Eyes: Conjunctivae and EOM are normal. Right eye exhibits no discharge. Left eye exhibits no discharge. No scleral icterus. Neck: Normal range of motion. Neck supple. No tracheal deviation present. Cardiovascular: Normal rate, regular rhythm and normal heart sounds. No murmur heard. Pulmonary/Chest: Effort normal and breath sounds normal. No respiratory distress. She has no wheezes. She has no rales. Abdominal: Soft. She exhibits no distension. There is no tenderness. There is no rebound and no guarding. Musculoskeletal: Normal range of motion. She exhibits no edema or deformity. Mild tenderness to palpation left lower back  Negative straight leg raise   Neurological: She is alert and oriented to person, place, and time. No cranial nerve deficit. Skin: Skin is warm and dry. She is not diaphoretic. Psychiatric: She has a normal mood and affect. Her behavior is normal. Judgment and thought content normal.         Diagnostic Study Results     Labs -  No results found for this or any previous visit (from the past 12 hour(s)). Radiologic Studies -   XR FEMUR LT 2 V   Final Result      XR PELV AP ONLY   Final Result        XR FEMUR LT: IMPRESSION:  1. No acute bony injuries. 2. Osteoarthritis of the left knee. XR PELV AP: IMPRESSION: No acute bony injuries. No significant joint space narrowing. Medical Decision Making   I am the first provider for this patient. I reviewed the vital signs, available nursing notes, past medical history, past surgical history, family history and social history. Vital Signs-Reviewed the patient's vital signs. Records Reviewed: Nursing Notes and Old Medical Records (Time of Review: 9:47 AM)      Provider Notes (Medical Decision Making):   Pt with pain in left hamstring distribution with no acute findings on x-ray, will be discharged with Naproxen and PCP follow up. Diagnosis     Clinical Impression:   1. Muscle strain        Disposition: home    Follow-up Information     Follow up With Details Comments Jones Gonzalez 89 in 2 days For follow up [address] [facility]    [facility] EMERGENCY DEPT Go to As needed, If symptoms worsen [address] [facility] [id number]  [phone]           Patient's Medications   Start Taking    NAPROXEN (NAPROSYN) 500 MG TABLET    Take 1 Tab by mouth two (2) times daily (with meals) for 10 days.    Continue Taking    AMLODIPINE (NORVASC) 5 MG TABLET    Take 0.5 Tabs by mouth daily. Indications: hypertension    ASPIRIN DELAYED-RELEASE 81 MG TABLET    Take 1 Tab by mouth daily. ATORVASTATIN (LIPITOR) 20 MG TABLET    Take 1 Tab by mouth nightly. AZITHROMYCIN (ZITHROMAX Z-SELVIN) 250 MG TABLET    Take two tablets the first day and then one every day thereafter until completion    DOCUSATE SODIUM (COLACE) 100 MG CAPSULE    Take 1 capsule twice daily    FOLIC ACID (FOLVITE) 1 MG TABLET    Take 1 Tab by mouth daily. PROMETHAZINE (PHENERGAN) 25 MG TABLET    Take 1 Tab by mouth every six (6) hours as needed. Indications: Nausea/Vomiting    THERAPEUTIC MULTIVITAMIN (THERAGRAN) TABLET    Take 1 Tab by mouth daily. THIAMINE (B-1) 100 MG TABLET    Take 1 Tab by mouth daily. These Medications have changed    No medications on file   Stop Taking    No medications on file     _______________________________    Attestations:  3 [facility] acting as a scribe for and in the presence of Jennie Alfaro MD      March 23, 2018 at 9:47 AM       Provider Attestation:      I personally performed the services described in the documentation, reviewed the documentation, as recorded by the scribe in my presence, and it accurately and completely records my words and actions.  March 23, 2018 at 9:47 AM - Jennie Alfaro MD    _______________________________

## 2018-03-23 NOTE — DISCHARGE INSTRUCTIONS
Hamstring Strain: Rehab Exercises  Your Care Instructions  Here are some examples of typical rehabilitation exercises for your condition. Start each exercise slowly. Ease off the exercise if you start to have pain. Your doctor or physical therapist will tell you when you can start these exercises and which ones will work best for you. How to do the exercises  Hamstring set (heel dig)    1. Sit with your affected leg bent. Your good leg should be straight and supported on the floor. 2. Tighten the muscles on the back of your bent leg (hamstring) by pressing your heel into the floor. 3. Hold for about 6 seconds, and then rest for up to 10 seconds. 4. Repeat 8 to 12 times. Hamstring curl    1. Lie on your stomach with your knees straight. Place a pillow under your stomach. If your kneecap is uncomfortable, roll up a washcloth and put it under your leg just above your kneecap. 2. Lift the foot of your affected leg by bending your knee so that you bring your foot up toward your buttock. If this motion hurts, try it without bending your knee quite as far. This may help you avoid any painful motion. 3. Slowly move your leg up and down. 4. Repeat 8 to 12 times. 5. When you can do this exercise with ease and no pain, add some resistance. To do this:  6. Tie the ends of an exercise band together to form a loop. Attach one end of the loop to a secure object or shut a door on it to hold it in place. (Or you can have someone hold one end of the loop to provide resistance.)  7. Loop the other end of the exercise band around the lower part of your affected leg. 8. Repeat steps 1 through 4, slowly pulling back on the exercise band with your leg. Hip extension    1. Stand facing a wall with your hands on the wall at about chest level. 2. Keeping the knee of your affected leg straight, kick that leg straight back behind you. 3. Relax, and lower your leg back to the starting position.   4. Repeat 8 to 12 times.  5. When you can do this exercise with ease and no pain, add some resistance. To do this:  6. Tie the ends of an exercise band together to form a loop. Attach one end of the loop to a secure object or shut a door on it to hold it in place. (Or you can have someone hold one end of the loop to provide resistance.)  7. Loop the other end of the exercise band around the lower part of your affected leg. 8. Repeat steps 1 through 4, slowly pulling back on the exercise band with your leg. Hamstring wall stretch    1. Lie on your back in a doorway, with your good leg through the open door. 2. Slide your affected leg up the wall to straighten your knee. You should feel a gentle stretch down the back of your leg. 1. Do not arch your back. 2. Do not bend either knee. 3. Keep one heel touching the floor and the other heel touching the wall. Do not point your toes. 3. Hold the stretch for at least 1 minute to begin. Then try to lengthen the time you hold the stretch to as long as 6 minutes. 4. Repeat 2 to 4 times. 5. If you do not have a place to do this exercise in a doorway, there is another way to do it:  6. Lie on your back, and bend the knee of your affected leg. 7. Loop a towel under the ball and toes of that foot, and hold the ends of the towel in your hands. 8. Straighten your knee, and slowly pull back on the towel. You should feel a gentle stretch down the back of your leg. 9. Hold the stretch for 15 to 30 seconds. Or even better, hold the stretch for 1 minute if you can. 10. Repeat 2 to 4 times. Calf stretch    1. Stand facing a wall with your hands on the wall at about eye level. Put your affected leg about a step behind your other leg. 2. Keeping your back leg straight and your back heel on the floor, bend your front knee and gently bring your hip and chest toward the wall until you feel a stretch in the calf of your back leg. 3. Hold the stretch for 15 to 30 seconds.   4. Repeat 2 to 4 times.  5. Repeat steps 1 through 4, but this time keep your back knee bent. Single-leg balance    1. Stand on a flat surface with your arms stretched out to your sides like you are making the letter \"T. \" Then lift your good leg off the floor, bending it at the knee. If you are not steady on your feet, use one hand to hold on to a chair, counter, or wall. 2. Standing on your affected leg, keep that knee straight. Try to balance on that leg for up to 30 seconds. Then rest for up to 10 seconds. 3. Repeat 6 to 8 times. 4. When you can balance on your affected leg for 30 seconds with your eyes open, try to balance on it with your eyes closed. 5. When you can do this exercise with your eyes closed for 30 seconds and with ease and no pain, try standing on a pillow or piece of foam, and repeat steps 1 through 4. Follow-up care is a key part of your treatment and safety. Be sure to make and go to all appointments, and call your doctor if you are having problems. It's also a good idea to know your test results and keep a list of the medicines you take. Where can you learn more? Go to http://allie-francine.info/. Enter [id number] in the search box to learn more about \"Hamstring Strain: Rehab Exercises. \"  Current as of: March 21, 2017  Content Version: 11.4  © 1791-9716 [employer]. Care instructions adapted under license by [employer] (which disclaims liability or warranty for this information). If you have questions about a medical condition or this instruction, always ask your healthcare professional. [address] any warranty or liability for your use of this information.

## 2018-03-23 NOTE — ED TRIAGE NOTES
The patient presents for evaluation of left hip and leg pain that began two days ago. Denies trauma.

## 2018-03-26 NOTE — ED NOTES
Contacted the patient but was unable to reach the patient. She was not in at this time, left message for the patient to contact my office when she return. If patient does not contact my office in 1 week she will be contacted by mail to help get assistance with getting established at [facility].

## 2018-03-26 NOTE — ED NOTES
The L.MADAI. returned the clients voicemail message and was informed that the client was able to get their prescriptions filled and will contact the  Department for further assistance with connecting with a primary care provider.

## 2018-07-31 ENCOUNTER — HOSPITAL ENCOUNTER (EMERGENCY)
Age: 53
Discharge: HOME | End: 2018-08-01
Attending: EMERGENCY MEDICINE | Admitting: EMERGENCY MEDICINE
Payer: SELF-PAY

## 2018-07-31 VITALS
HEART RATE: 86 BPM | BODY MASS INDEX: 20.73 KG/M2 | OXYGEN SATURATION: 100 % | HEIGHT: 64 IN | RESPIRATION RATE: 16 BRPM | WEIGHT: 121.4 LBS | TEMPERATURE: 97.7 F | SYSTOLIC BLOOD PRESSURE: 144 MMHG | DIASTOLIC BLOOD PRESSURE: 99 MMHG

## 2018-07-31 DIAGNOSIS — N39.0 ACUTE UTI: Primary | ICD-10-CM

## 2018-07-31 DIAGNOSIS — I10 ESSENTIAL HYPERTENSION: ICD-10-CM

## 2018-07-31 LAB
ALBUMIN SERPL-MCNC: 3.7 G/DL (ref 3.4–5)
ALBUMIN/GLOB SERPL: 0.9 {RATIO} (ref 0.8–1.7)
ALP SERPL-CCNC: 69 U/L (ref 45–117)
ALT SERPL-CCNC: 16 U/L (ref 13–56)
ANION GAP SERPL CALC-SCNC: 7 MMOL/L (ref 3–18)
APPEARANCE UR: CLEAR
AST SERPL-CCNC: 13 U/L (ref 15–37)
BACTERIA URNS QL MICRO: ABNORMAL /HPF
BASOPHILS # BLD: 0 K/UL (ref 0–0.1)
BASOPHILS NFR BLD: 0 % (ref 0–2)
BILIRUB DIRECT SERPL-MCNC: <0.1 MG/DL (ref 0–0.2)
BILIRUB SERPL-MCNC: 0.3 MG/DL (ref 0.2–1)
BILIRUB UR QL: NEGATIVE
BUN SERPL-MCNC: 15 MG/DL (ref 7–18)
BUN/CREAT SERPL: 11 (ref 12–20)
CALCIUM SERPL-MCNC: 8.7 MG/DL (ref 8.5–10.1)
CHLORIDE SERPL-SCNC: 109 MMOL/L (ref 100–108)
CK MB CFR SERPL CALC: NORMAL % (ref 0–4)
CK MB SERPL-MCNC: <1 NG/ML (ref 5–25)
CK SERPL-CCNC: 74 U/L (ref 26–192)
CO2 SERPL-SCNC: 28 MMOL/L (ref 21–32)
COLOR UR: YELLOW
CREAT SERPL-MCNC: 1.36 MG/DL (ref 0.6–1.3)
DIFFERENTIAL METHOD BLD: ABNORMAL
EOSINOPHIL # BLD: 0 K/UL (ref 0–0.4)
EOSINOPHIL NFR BLD: 1 % (ref 0–5)
EPITH CASTS URNS QL MICRO: ABNORMAL /LPF (ref 0–5)
ERYTHROCYTE [DISTWIDTH] IN BLOOD BY AUTOMATED COUNT: 13.1 % (ref 11.6–14.5)
GLOBULIN SER CALC-MCNC: 3.9 G/DL (ref 2–4)
GLUCOSE SERPL-MCNC: 81 MG/DL (ref 74–99)
GLUCOSE UR STRIP.AUTO-MCNC: NEGATIVE MG/DL
HCG UR QL: NEGATIVE
HCT VFR BLD AUTO: 35.6 % (ref 35–45)
HGB BLD-MCNC: 12.1 G/DL (ref 12–16)
HGB UR QL STRIP: NEGATIVE
KETONES UR QL STRIP.AUTO: ABNORMAL MG/DL
LEUKOCYTE ESTERASE UR QL STRIP.AUTO: ABNORMAL
LIPASE SERPL-CCNC: 175 U/L (ref 73–393)
LYMPHOCYTES # BLD: 1 K/UL (ref 0.9–3.6)
LYMPHOCYTES NFR BLD: 24 % (ref 21–52)
MCH RBC QN AUTO: 33.9 PG (ref 24–34)
MCHC RBC AUTO-ENTMCNC: 34 G/DL (ref 31–37)
MCV RBC AUTO: 99.7 FL (ref 74–97)
MONOCYTES # BLD: 0.4 K/UL (ref 0.05–1.2)
MONOCYTES NFR BLD: 10 % (ref 3–10)
NEUTS SEG # BLD: 2.8 K/UL (ref 1.8–8)
NEUTS SEG NFR BLD: 65 % (ref 40–73)
NITRITE UR QL STRIP.AUTO: NEGATIVE
PH UR STRIP: 7 [PH] (ref 5–8)
PLATELET # BLD AUTO: 183 K/UL (ref 135–420)
PMV BLD AUTO: 9.5 FL (ref 9.2–11.8)
POTASSIUM SERPL-SCNC: 3.8 MMOL/L (ref 3.5–5.5)
PROT SERPL-MCNC: 7.6 G/DL (ref 6.4–8.2)
PROT UR STRIP-MCNC: NEGATIVE MG/DL
RBC # BLD AUTO: 3.57 M/UL (ref 4.2–5.3)
RBC #/AREA URNS HPF: ABNORMAL /HPF (ref 0–5)
SODIUM SERPL-SCNC: 144 MMOL/L (ref 136–145)
SP GR UR REFRACTOMETRY: 1.02 (ref 1–1.03)
TRICHOMONAS UR QL MICRO: ABNORMAL
TROPONIN I SERPL-MCNC: <0.02 NG/ML (ref 0–0.04)
UROBILINOGEN UR QL STRIP.AUTO: 1 EU/DL (ref 0.2–1)
WBC # BLD AUTO: 4.3 K/UL (ref 4.6–13.2)
WBC URNS QL MICRO: ABNORMAL /HPF (ref 0–4)

## 2018-07-31 PROCEDURE — 96374 THER/PROPH/DIAG INJ IV PUSH: CPT

## 2018-07-31 PROCEDURE — 74011250636 HC RX REV CODE- 250/636: Performed by: EMERGENCY MEDICINE

## 2018-07-31 PROCEDURE — 82550 ASSAY OF CK (CPK): CPT | Performed by: EMERGENCY MEDICINE

## 2018-07-31 PROCEDURE — 83690 ASSAY OF LIPASE: CPT | Performed by: EMERGENCY MEDICINE

## 2018-07-31 PROCEDURE — 80076 HEPATIC FUNCTION PANEL: CPT | Performed by: EMERGENCY MEDICINE

## 2018-07-31 PROCEDURE — 74011000250 HC RX REV CODE- 250: Performed by: EMERGENCY MEDICINE

## 2018-07-31 PROCEDURE — 80048 BASIC METABOLIC PNL TOTAL CA: CPT | Performed by: EMERGENCY MEDICINE

## 2018-07-31 PROCEDURE — 81001 URINALYSIS AUTO W/SCOPE: CPT | Performed by: EMERGENCY MEDICINE

## 2018-07-31 PROCEDURE — 99283 EMERGENCY DEPT VISIT LOW MDM: CPT

## 2018-07-31 PROCEDURE — 81025 URINE PREGNANCY TEST: CPT | Performed by: EMERGENCY MEDICINE

## 2018-07-31 PROCEDURE — 93005 ELECTROCARDIOGRAM TRACING: CPT

## 2018-07-31 PROCEDURE — 85025 COMPLETE CBC W/AUTO DIFF WBC: CPT | Performed by: EMERGENCY MEDICINE

## 2018-07-31 RX ORDER — CEPHALEXIN 500 MG/1
500 CAPSULE ORAL 4 TIMES DAILY
Qty: 28 CAP | Refills: 0 | Status: SHIPPED | OUTPATIENT
Start: 2018-07-31 | End: 2018-08-07

## 2018-07-31 RX ADMIN — CEFTRIAXONE SODIUM 1 G: 1 INJECTION, POWDER, FOR SOLUTION INTRAMUSCULAR; INTRAVENOUS at 23:11

## 2018-08-01 LAB
ATRIAL RATE: 77 BPM
CALCULATED P AXIS, ECG09: 71 DEGREES
CALCULATED R AXIS, ECG10: 48 DEGREES
CALCULATED T AXIS, ECG11: 72 DEGREES
DIAGNOSIS, 93000: NORMAL
P-R INTERVAL, ECG05: 176 MS
Q-T INTERVAL, ECG07: 400 MS
QRS DURATION, ECG06: 98 MS
QTC CALCULATION (BEZET), ECG08: 452 MS
VENTRICULAR RATE, ECG03: 77 BPM

## 2018-08-01 NOTE — ED NOTES
I have reviewed discharge instructions with the patient. The patient verbalized understanding. Discharge medications reviewed with patient and appropriate educational materials and side effects teaching were provided. I have reviewed the provider's instructions with the patient, answering all questions to her satisfaction. Patient armband removed and given to patient to take home. Patient was informed of the privacy risks if armband lost or stolen. Pt signed paper discharge instructions removed all belongings pt ambulated without distress or discomfort.

## 2018-08-01 NOTE — ED PROVIDER NOTES
EMERGENCY DEPARTMENT HISTORY AND PHYSICAL EXAM    Date: 7/31/2018  Patient Name: Reta Ahuja    History of Presenting Illness     Chief Complaint   Patient presents with    Abdominal Pain    Back Pain         History Provided By: Patient    Chief Complaint: epigastric abd pain  Duration: 4 Days  Timing:  Acute  Location: epigastric  Quality: Cramping  Severity: Moderate  Modifying Factors: drank ETOH night before symptom onset  Associated Symptoms: pain now radiates to her back      Additional History (Context): Reta Ahuja is a 45 y.o. female with hypertension and hyperlipidemia who presents with epigastric abd pain x 4d; has vomited a few times. Denies diarrhea, melena, hematochezia, dysuria, h/o pancreatitis, cirrhosis, hepatitis, prior abd surgeries or prior colonoscopy. No one else ill at home. Did drink ETOH night prior to symptom onset. PCP: None    Current Facility-Administered Medications   Medication Dose Route Frequency Provider Last Rate Last Dose    cefTRIAXone (ROCEPHIN) 1 g in sterile water (preservative free) 10 mL IV syringe  1 g IntraVENous NOW ARCHIE Davidson         Current Outpatient Prescriptions   Medication Sig Dispense Refill    cephALEXin (KEFLEX) 500 mg capsule Take 1 Cap by mouth four (4) times daily for 7 days. 28 Cap 0    azithromycin (ZITHROMAX Z-SELVIN) 250 mg tablet Take two tablets the first day and then one every day thereafter until completion 6 Tab 0    aspirin delayed-release 81 mg tablet Take 1 Tab by mouth daily. 30 Tab 0    atorvastatin (LIPITOR) 20 mg tablet Take 1 Tab by mouth nightly. 30 Tab 0    folic acid (FOLVITE) 1 mg tablet Take 1 Tab by mouth daily. 30 Tab 0    therapeutic multivitamin (THERAGRAN) tablet Take 1 Tab by mouth daily. 30 Tab 0    thiamine (B-1) 100 mg tablet Take 1 Tab by mouth daily. 30 Tab 0    amLODIPine (NORVASC) 5 mg tablet Take 0.5 Tabs by mouth daily.  Indications: hypertension 30 Tab 0    docusate sodium (COLACE) 100 mg capsule Take 1 capsule twice daily 60 Cap 0    promethazine (PHENERGAN) 25 mg tablet Take 1 Tab by mouth every six (6) hours as needed. Indications: Nausea/Vomiting 12 Tab 0       Past History     Past Medical History:  History reviewed. No pertinent past medical history. Past Surgical History:  History reviewed. No pertinent surgical history. Family History:  History reviewed. No pertinent family history. Social History:  Social History   Substance Use Topics    Smoking status: Never Smoker    Smokeless tobacco: Never Used    Alcohol use Yes      Comment: occational beer       Allergies:  No Known Allergies      Review of Systems   Review of Systems   Constitutional: Negative for fever. Gastrointestinal: Positive for abdominal pain, nausea and vomiting. Negative for blood in stool, constipation and diarrhea. Genitourinary: Positive for flank pain. Negative for dysuria. All other systems reviewed and are negative. All Other Systems Negative  Physical Exam     Vitals:    07/31/18 2048   BP: (!) 144/99   Pulse: 86   Resp: 16   Temp: 97.7 °F (36.5 °C)   SpO2: 100%   Weight: 55.1 kg (121 lb 6.4 oz)   Height: 5' 4\" (1.626 m)     Physical Exam   Constitutional: She is oriented to person, place, and time. She appears well-developed. HENT:   Head: Normocephalic and atraumatic. Eyes: Pupils are equal, round, and reactive to light. Neck: No JVD present. No tracheal deviation present. No thyromegaly present. Cardiovascular: Normal rate, regular rhythm and normal heart sounds. Exam reveals no gallop and no friction rub. No murmur heard. Pulmonary/Chest: Effort normal and breath sounds normal. No stridor. No respiratory distress. She has no wheezes. She has no rales. She exhibits no tenderness. Abdominal: Soft. She exhibits no distension and no mass. There is tenderness. There is no rebound and no guarding. Mild epigastric abd TTP   Musculoskeletal: She exhibits no edema or tenderness. Lymphadenopathy:     She has no cervical adenopathy. Neurological: She is alert and oriented to person, place, and time. Skin: Skin is warm and dry. No rash noted. No erythema. No pallor. Psychiatric: She has a normal mood and affect. Her behavior is normal. Thought content normal.   Nursing note and vitals reviewed.              Diagnostic Study Results     Labs -     Recent Results (from the past 12 hour(s))   URINALYSIS W/ RFLX MICROSCOPIC    Collection Time: 07/31/18  8:50 PM   Result Value Ref Range    Color YELLOW      Appearance CLEAR      Specific gravity 1.024 1.005 - 1.030      pH (UA) 7.0 5.0 - 8.0      Protein NEGATIVE  NEG mg/dL    Glucose NEGATIVE  NEG mg/dL    Ketone TRACE (A) NEG mg/dL    Bilirubin NEGATIVE  NEG      Blood NEGATIVE  NEG      Urobilinogen 1.0 0.2 - 1.0 EU/dL    Nitrites NEGATIVE  NEG      Leukocyte Esterase LARGE (A) NEG     HCG URINE, QL    Collection Time: 07/31/18  8:50 PM   Result Value Ref Range    HCG urine, QL NEGATIVE  NEG     URINE MICROSCOPIC ONLY    Collection Time: 07/31/18  8:50 PM   Result Value Ref Range    WBC TOO NUMEROUS TO COUNT 0 - 4 /hpf    RBC 0 to 3 0 - 5 /hpf    Epithelial cells 2+ 0 - 5 /lpf    Bacteria 4+ (A) NEG /hpf    Trichomonas 1+ (A) NEG   EKG, 12 LEAD, INITIAL    Collection Time: 07/31/18  9:46 PM   Result Value Ref Range    Ventricular Rate 77 BPM    Atrial Rate 77 BPM    P-R Interval 176 ms    QRS Duration 98 ms    Q-T Interval 400 ms    QTC Calculation (Bezet) 452 ms    Calculated P Axis 71 degrees    Calculated R Axis 48 degrees    Calculated T Axis 72 degrees    Diagnosis       Normal sinus rhythm  Possible Left atrial enlargement  Left ventricular hypertrophy  Cannot rule out Septal infarct (cited on or before 25-OCT-2017)  Abnormal ECG  When compared with ECG of 26-OCT-2017 09:39,  ST elevation now present in Anterior leads  Nonspecific T wave abnormality, improved in Anterior leads  Nonspecific T wave abnormality now evident in Lateral leads     CBC WITH AUTOMATED DIFF    Collection Time: 07/31/18  9:55 PM   Result Value Ref Range    WBC 4.3 (L) 4.6 - 13.2 K/uL    RBC 3.57 (L) 4.20 - 5.30 M/uL    HGB 12.1 12.0 - 16.0 g/dL    HCT 35.6 35.0 - 45.0 %    MCV 99.7 (H) 74.0 - 97.0 FL    MCH 33.9 24.0 - 34.0 PG    MCHC 34.0 31.0 - 37.0 g/dL    RDW 13.1 11.6 - 14.5 %    PLATELET 335 888 - 750 K/uL    MPV 9.5 9.2 - 11.8 FL    NEUTROPHILS 65 40 - 73 %    LYMPHOCYTES 24 21 - 52 %    MONOCYTES 10 3 - 10 %    EOSINOPHILS 1 0 - 5 %    BASOPHILS 0 0 - 2 %    ABS. NEUTROPHILS 2.8 1.8 - 8.0 K/UL    ABS. LYMPHOCYTES 1.0 0.9 - 3.6 K/UL    ABS. MONOCYTES 0.4 0.05 - 1.2 K/UL    ABS. EOSINOPHILS 0.0 0.0 - 0.4 K/UL    ABS. BASOPHILS 0.0 0.0 - 0.1 K/UL    DF AUTOMATED     METABOLIC PANEL, BASIC    Collection Time: 07/31/18  9:55 PM   Result Value Ref Range    Sodium 144 136 - 145 mmol/L    Potassium 3.8 3.5 - 5.5 mmol/L    Chloride 109 (H) 100 - 108 mmol/L    CO2 28 21 - 32 mmol/L    Anion gap 7 3.0 - 18 mmol/L    Glucose 81 74 - 99 mg/dL    BUN 15 7.0 - 18 MG/DL    Creatinine 1.36 (H) 0.6 - 1.3 MG/DL    BUN/Creatinine ratio 11 (L) 12 - 20      GFR est AA 49 (L) >60 ml/min/1.73m2    GFR est non-AA 41 (L) >60 ml/min/1.73m2    Calcium 8.7 8.5 - 10.1 MG/DL   LIPASE    Collection Time: 07/31/18  9:55 PM   Result Value Ref Range    Lipase 175 73 - 393 U/L   HEPATIC FUNCTION PANEL    Collection Time: 07/31/18  9:55 PM   Result Value Ref Range    Protein, total 7.6 6.4 - 8.2 g/dL    Albumin 3.7 3.4 - 5.0 g/dL    Globulin 3.9 2.0 - 4.0 g/dL    A-G Ratio 0.9 0.8 - 1.7      Bilirubin, total 0.3 0.2 - 1.0 MG/DL    Bilirubin, direct <0.1 0.0 - 0.2 MG/DL    Alk.  phosphatase 69 45 - 117 U/L    AST (SGOT) 13 (L) 15 - 37 U/L    ALT (SGPT) 16 13 - 56 U/L   CARDIAC PANEL,(CK, CKMB & TROPONIN)    Collection Time: 07/31/18  9:55 PM   Result Value Ref Range    CK 74 26 - 192 U/L    CK - MB <1.0 <3.6 ng/ml    CK-MB Index  0.0 - 4.0 %     CALCULATION NOT PERFORMED WHEN RESULT IS BELOW LINEAR LIMIT    Troponin-I, Qt. <0.02 0.0 - 0.045 NG/ML       Radiologic Studies -   No orders to display     CT Results  (Last 48 hours)    None        CXR Results  (Last 48 hours)    None            Medical Decision Making   I am the first provider for this patient. I reviewed the vital signs, available nursing notes, past medical history, past surgical history, family history and social history. Vital Signs-Reviewed the patient's vital signs. Records Reviewed: Nursing Notes and Old Medical Records    Procedures:  Procedures    Provider Notes (Medical Decision Making): treat acute UTI; likely with h/o ETOH abuse she had initially alcoholic gastritis. Her discomfort in the left flank is related to her UTI. Will give dose of Rocephin now and treat UTI as outpatient. Give PCP clinic referral.    MED RECONCILIATION:  Current Facility-Administered Medications   Medication Dose Route Frequency    cefTRIAXone (ROCEPHIN) 1 g in sterile water (preservative free) 10 mL IV syringe  1 g IntraVENous NOW     Current Outpatient Prescriptions   Medication Sig    cephALEXin (KEFLEX) 500 mg capsule Take 1 Cap by mouth four (4) times daily for 7 days.  azithromycin (ZITHROMAX Z-SELVIN) 250 mg tablet Take two tablets the first day and then one every day thereafter until completion    aspirin delayed-release 81 mg tablet Take 1 Tab by mouth daily.  atorvastatin (LIPITOR) 20 mg tablet Take 1 Tab by mouth nightly.  folic acid (FOLVITE) 1 mg tablet Take 1 Tab by mouth daily.  therapeutic multivitamin (THERAGRAN) tablet Take 1 Tab by mouth daily.  thiamine (B-1) 100 mg tablet Take 1 Tab by mouth daily.  amLODIPine (NORVASC) 5 mg tablet Take 0.5 Tabs by mouth daily. Indications: hypertension    docusate sodium (COLACE) 100 mg capsule Take 1 capsule twice daily    promethazine (PHENERGAN) 25 mg tablet Take 1 Tab by mouth every six (6) hours as needed.  Indications: Nausea/Vomiting Disposition:  home    DISCHARGE NOTE:   10:36 PM    Pt has been reexamined. Patient has no new complaints, changes, or physical findings. Care plan outlined and precautions discussed. Results of labs were reviewed with the patient. All medications were reviewed with the patient; will d/c home with Keflex. All of pt's questions and concerns were addressed. Patient was instructed and agrees to follow up with PCP, as well as to return to the ED upon further deterioration. Patient is ready to go home. Follow-up Information     Follow up With Details Comments Ruth Ann Carrillo Schedule an appointment as soon as possible for a visit in 1 day  [place] [facility] EMERGENCY DEPT  If symptoms worsen return immediately [address]  [phone]          Current Discharge Medication List      START taking these medications    Details   cephALEXin (KEFLEX) 500 mg capsule Take 1 Cap by mouth four (4) times daily for 7 days. Qty: 28 Cap, Refills: 0               Diagnosis     Clinical Impression:   1. Acute UTI    2.  Essential hypertension

## 2018-08-01 NOTE — DISCHARGE INSTRUCTIONS
Learning About High Blood Pressure  What is high blood pressure? Blood pressure is a measure of how hard the blood pushes against the walls of your arteries. It's normal for blood pressure to go up and down throughout the day, but if it stays up, you have high blood pressure. Another name for high blood pressure is hypertension. Two numbers tell you your blood pressure. The first number is the systolic pressure. It shows how hard the blood pushes when your heart is pumping. The second number is the diastolic pressure. It shows how hard the blood pushes between heartbeats, when your heart is relaxed and filling with blood. A blood pressure of less than 120/80 (say \"120 over 80\") is ideal for an adult. High blood pressure is 130/80 or higher. You have high blood pressure if your top number is 130 or higher or your bottom number is 80 or higher, or both. What happens when you have high blood pressure? · Blood flows through your arteries with too much force. Over time, this damages the walls of your arteries. But you can't feel it. High blood pressure usually doesn't cause symptoms. · Fat and calcium start to build up in your arteries. This buildup is called plaque. Plaque makes your arteries narrower and stiffer. Blood can't flow through them as easily. · This lack of good blood flow starts to damage some of the organs in your body. This can lead to problems such as coronary artery disease and heart attack, heart failure, stroke, kidney failure, and eye damage. How can you prevent high blood pressure? · Stay at a healthy weight. · Try to limit how much sodium you eat to less than 2,300 milligrams (mg) a day. If you limit your sodium to 1,500 mg a day, you can lower your blood pressure even more. ¨ Buy foods that are labeled \"unsalted,\" \"sodium-free,\" or \"low-sodium. \" Foods labeled \"reduced-sodium\" and \"light sodium\" may still have too much sodium.   ¨ Flavor your food with garlic, lemon juice, onion, vinegar, herbs, and spices instead of salt. Do not use soy sauce, steak sauce, onion salt, garlic salt, mustard, or ketchup on your food. ¨ Use less salt (or none) when recipes call for it. You can often use half the salt a recipe calls for without losing flavor. · Be physically active. Get at least 30 minutes of exercise on most days of the week. Walking is a good choice. You also may want to do other activities, such as running, swimming, cycling, or playing tennis or team sports. · Limit alcohol to 2 drinks a day for men and 1 drink a day for women. · Eat plenty of fruits, vegetables, and low-fat dairy products. Eat less saturated and total fats. How is high blood pressure treated? · Your doctor will suggest making lifestyle changes. For example, your doctor may ask you to eat healthy foods, quit smoking, lose extra weight, and be more active. · If lifestyle changes don't help enough or your blood pressure is very high, you will have to take medicine every day. Follow-up care is a key part of your treatment and safety. Be sure to make and go to all appointments, and call your doctor if you are having problems. It's also a good idea to know your test results and keep a list of the medicines you take. Where can you learn more? Go to http://allie-francine.info/. Enter P501 in the search box to learn more about \"Learning About High Blood Pressure. \"  Current as of: May 10, 2017  Content Version: 11.7  © 8366-7906 [employer]. Care instructions adapted under license by [employer] (which disclaims liability or warranty for this information). If you have questions about a medical condition or this instruction, always ask your healthcare professional. [place] [address] any warranty or liability for your use of this information.          Kidney Disease and High Blood Pressure: Care Instructions  Your Care Instructions    Long-term (chronic) kidney disease happens when the kidneys cannot remove waste and keep your body's fluids and chemicals in balance. Usually, the kidneys remove waste from the blood through the urine. When the kidneys are not working well, waste can build up so much that it poisons the body. Kidney disease can make you very tired. It also can cause swelling, or edema, in your legs or other areas of your body. High blood pressure is one of the major causes of chronic kidney disease. And kidney disease can also cause high blood pressure. No matter which came first, having high blood pressure damages the tiny blood vessels in the kidneys. If you have high blood pressure, it is important to lower it. There are many things you can do to lower your blood pressure, which may help slow or stop the damage to your kidneys. Follow-up care is a key part of your treatment and safety. Be sure to make and go to all appointments, and call your doctor if you are having problems. It's also a good idea to know your test results and keep a list of the medicines you take. How can you care for yourself at home? · Be safe with medicines. Take your medicines exactly as prescribed. Call your doctor if you have any problems with your medicine. You will probably need more than one medicine to lower your blood pressure. You will get more details on the specific medicines your doctor prescribes. · Work with your doctor and a dietitian to plan meals that have the right amount of nutrients for you. You will probably have to limit salt, fluids, and protein. · Stay at a healthy weight. This is very important if you put on weight around the waist. Losing even 10 pounds can help you lower your blood pressure. · Manage other health problems such as diabetes and high cholesterol. You can help lower your risk for heart disease and blood vessel problems with a healthy lifestyle along with medicines.   · Do not take ibuprofen (Advil, Motrin) or naproxen (Aleve), or similar medicines, unless your doctor tells you to. They may make chronic kidney disease worse. It is okay to take acetaminophen (Tylenol). · If your doctor recommends it, get more exercise. Walking is a good choice. Bit by bit, increase the amount you walk every day. Try for at least 30 minutes on most days of the week. You also may want to swim, bike, or do other activities. · Limit or avoid alcohol. Talk to your doctor about whether you can drink any alcohol. · Do not smoke or allow others to smoke around you. If you need help quitting, talk to your doctor about stop-smoking programs and medicines. These can increase your chances of quitting for good. When should you call for help? Call 911 anytime you think you may need emergency care. For example, call if:    · You passed out (lost consciousness).    Call your doctor now or seek immediate medical care if:    · You have new or worse nausea and vomiting.     · You have much less urine than normal, or you have no urine.     · You are feeling confused or cannot think clearly.     · You have new or more blood in your urine.     · You have new swelling.     · You are dizzy or lightheaded, or you feel like you may faint.    Watch closely for changes in your health, and be sure to contact your doctor if:    · You do not get better as expected. Where can you learn more? Go to http://allie-francine.info/. Enter X683 in the search box to learn more about \"Kidney Disease and High Blood Pressure: Care Instructions. \"  Current as of: May 12, 2017  Content Version: 11.7  © 1841-4730 [employer]. Care instructions adapted under license by [employer] (which disclaims liability or warranty for this information). If you have questions about a medical condition or this instruction, always ask your healthcare professional. [address] any warranty or liability for your use of this information.          Urinary Tract Infection in Women: Care Instructions  Your Care Instructions    A urinary tract infection, or UTI, is a general term for an infection anywhere between the kidneys and the urethra (where urine comes out). Most UTIs are bladder infections. They often cause pain or burning when you urinate. UTIs are caused by bacteria and can be cured with antibiotics. Be sure to complete your treatment so that the infection goes away. Follow-up care is a key part of your treatment and safety. Be sure to make and go to all appointments, and call your doctor if you are having problems. It's also a good idea to know your test results and keep a list of the medicines you take. How can you care for yourself at home? · Take your antibiotics as directed. Do not stop taking them just because you feel better. You need to take the full course of antibiotics. · Drink extra water and other fluids for the next day or two. This may help wash out the bacteria that are causing the infection. (If you have kidney, heart, or liver disease and have to limit fluids, talk with your doctor before you increase your fluid intake.)  · Avoid drinks that are carbonated or have caffeine. They can irritate the bladder. · Urinate often. Try to empty your bladder each time. · To relieve pain, take a hot bath or lay a heating pad set on low over your lower belly or genital area. Never go to sleep with a heating pad in place. To prevent UTIs  · Drink plenty of water each day. This helps you urinate often, which clears bacteria from your system. (If you have kidney, heart, or liver disease and have to limit fluids, talk with your doctor before you increase your fluid intake.)  · Urinate when you need to. · Urinate right after you have sex. · Change sanitary pads often. · Avoid douches, bubble baths, feminine hygiene sprays, and other feminine hygiene products that have deodorants. · After going to the bathroom, wipe from front to back.   When should you call for help? Call your doctor now or seek immediate medical care if:    · Symptoms such as fever, chills, nausea, or vomiting get worse or appear for the first time.     · You have new pain in your back just below your rib cage. This is called flank pain.     · There is new blood or pus in your urine.     · You have any problems with your antibiotic medicine.    Watch closely for changes in your health, and be sure to contact your doctor if:    · You are not getting better after taking an antibiotic for 2 days.     · Your symptoms go away but then come back. Where can you learn more? Go to http://allie-francine.info/. Enter U844 in the search box to learn more about \"Urinary Tract Infection in Women: Care Instructions. \"  Current as of: May 12, 2017  Content Version: 11.7  © 7740-7670 [employer]. Care instructions adapted under license by [employer] (which disclaims liability or warranty for this information). If you have questions about a medical condition or this instruction, always ask your healthcare professional. [place] [address] any warranty or liability for your use of this information.

## 2018-08-01 NOTE — ED NOTES
I performed a brief evaluation, including history and physical, of the patient here in triage and I have determined that pt will need further treatment and evaluation from the main side ER physician. I have placed initial orders to help in expediting patients care. July 31, 2018 at 8:47 PM - ARCHIE Hoffmann There were no vitals taken for this visit.

## 2018-08-01 NOTE — ED NOTES
Pt in ED on stretcher with c/o abdominal pain with nausea and back pain pt denies vomiting or diarrhea.

## 2019-07-02 ENCOUNTER — APPOINTMENT (OUTPATIENT)
Dept: GENERAL RADIOLOGY | Age: 54
End: 2019-07-02
Attending: PHYSICIAN ASSISTANT
Payer: MEDICAID

## 2019-07-02 ENCOUNTER — HOSPITAL ENCOUNTER (EMERGENCY)
Age: 54
Discharge: HOME | End: 2019-07-02
Attending: EMERGENCY MEDICINE
Payer: MEDICAID

## 2019-07-02 VITALS
HEART RATE: 66 BPM | OXYGEN SATURATION: 100 % | BODY MASS INDEX: 21 KG/M2 | RESPIRATION RATE: 16 BRPM | HEIGHT: 64 IN | DIASTOLIC BLOOD PRESSURE: 101 MMHG | WEIGHT: 123 LBS | TEMPERATURE: 98.1 F | SYSTOLIC BLOOD PRESSURE: 144 MMHG

## 2019-07-02 DIAGNOSIS — D72.819 LEUKOPENIA, UNSPECIFIED TYPE: ICD-10-CM

## 2019-07-02 DIAGNOSIS — M25.561 ACUTE PAIN OF RIGHT KNEE: ICD-10-CM

## 2019-07-02 DIAGNOSIS — N76.0 BV (BACTERIAL VAGINOSIS): ICD-10-CM

## 2019-07-02 DIAGNOSIS — N30.00 ACUTE CYSTITIS WITHOUT HEMATURIA: Primary | ICD-10-CM

## 2019-07-02 DIAGNOSIS — B96.89 BV (BACTERIAL VAGINOSIS): ICD-10-CM

## 2019-07-02 DIAGNOSIS — R03.0 ELEVATED BLOOD PRESSURE READING: ICD-10-CM

## 2019-07-02 LAB
ALBUMIN SERPL-MCNC: 3.6 G/DL (ref 3.4–5)
ALBUMIN/GLOB SERPL: 0.9 {RATIO} (ref 0.8–1.7)
ALP SERPL-CCNC: 78 U/L (ref 45–117)
ALT SERPL-CCNC: 17 U/L (ref 13–56)
ANION GAP SERPL CALC-SCNC: 5 MMOL/L (ref 3–18)
APPEARANCE UR: ABNORMAL
AST SERPL-CCNC: 15 U/L (ref 15–37)
BACTERIA URNS QL MICRO: ABNORMAL /HPF
BASOPHILS # BLD: 0 K/UL (ref 0–0.1)
BASOPHILS NFR BLD: 0 % (ref 0–2)
BILIRUB SERPL-MCNC: 0.3 MG/DL (ref 0.2–1)
BILIRUB UR QL: NEGATIVE
BUN SERPL-MCNC: 15 MG/DL (ref 7–18)
BUN/CREAT SERPL: 20 (ref 12–20)
CALCIUM SERPL-MCNC: 8.8 MG/DL (ref 8.5–10.1)
CHLORIDE SERPL-SCNC: 110 MMOL/L (ref 100–108)
CO2 SERPL-SCNC: 29 MMOL/L (ref 21–32)
COLOR UR: YELLOW
CREAT SERPL-MCNC: 0.74 MG/DL (ref 0.6–1.3)
D DIMER PPP FEU-MCNC: 0.29 UG/ML(FEU)
DIFFERENTIAL METHOD BLD: ABNORMAL
EOSINOPHIL # BLD: 0.1 K/UL (ref 0–0.4)
EOSINOPHIL NFR BLD: 2 % (ref 0–5)
EPITH CASTS URNS QL MICRO: ABNORMAL /LPF (ref 0–5)
ERYTHROCYTE [DISTWIDTH] IN BLOOD BY AUTOMATED COUNT: 12.7 % (ref 11.6–14.5)
GLOBULIN SER CALC-MCNC: 3.8 G/DL (ref 2–4)
GLUCOSE SERPL-MCNC: 57 MG/DL (ref 74–99)
GLUCOSE UR STRIP.AUTO-MCNC: NEGATIVE MG/DL
HCT VFR BLD AUTO: 37.7 % (ref 35–45)
HGB BLD-MCNC: 13 G/DL (ref 12–16)
HGB UR QL STRIP: NEGATIVE
KETONES UR QL STRIP.AUTO: NEGATIVE MG/DL
LEUKOCYTE ESTERASE UR QL STRIP.AUTO: ABNORMAL
LYMPHOCYTES # BLD: 1.3 K/UL (ref 0.9–3.6)
LYMPHOCYTES NFR BLD: 46 % (ref 21–52)
MCH RBC QN AUTO: 34.5 PG (ref 24–34)
MCHC RBC AUTO-ENTMCNC: 34.5 G/DL (ref 31–37)
MCV RBC AUTO: 100 FL (ref 74–97)
MONOCYTES # BLD: 0.2 K/UL (ref 0.05–1.2)
MONOCYTES NFR BLD: 7 % (ref 3–10)
NEUTS SEG # BLD: 1.3 K/UL (ref 1.8–8)
NEUTS SEG NFR BLD: 45 % (ref 40–73)
NITRITE UR QL STRIP.AUTO: NEGATIVE
PH UR STRIP: 6.5 [PH] (ref 5–8)
PLATELET # BLD AUTO: 198 K/UL (ref 135–420)
PMV BLD AUTO: 9.5 FL (ref 9.2–11.8)
POTASSIUM SERPL-SCNC: 3.9 MMOL/L (ref 3.5–5.5)
PROT SERPL-MCNC: 7.4 G/DL (ref 6.4–8.2)
PROT UR STRIP-MCNC: NEGATIVE MG/DL
RBC # BLD AUTO: 3.77 M/UL (ref 4.2–5.3)
SERVICE CMNT-IMP: NORMAL
SODIUM SERPL-SCNC: 144 MMOL/L (ref 136–145)
SP GR UR REFRACTOMETRY: 1.02 (ref 1–1.03)
UROBILINOGEN UR QL STRIP.AUTO: 0.2 EU/DL (ref 0.2–1)
WBC # BLD AUTO: 2.8 K/UL (ref 4.6–13.2)
WBC URNS QL MICRO: ABNORMAL /HPF (ref 0–4)
WET PREP GENITAL: NORMAL

## 2019-07-02 PROCEDURE — 85379 FIBRIN DEGRADATION QUANT: CPT

## 2019-07-02 PROCEDURE — 99283 EMERGENCY DEPT VISIT LOW MDM: CPT

## 2019-07-02 PROCEDURE — 85025 COMPLETE CBC W/AUTO DIFF WBC: CPT

## 2019-07-02 PROCEDURE — 87210 SMEAR WET MOUNT SALINE/INK: CPT

## 2019-07-02 PROCEDURE — 81001 URINALYSIS AUTO W/SCOPE: CPT

## 2019-07-02 PROCEDURE — 80053 COMPREHEN METABOLIC PANEL: CPT

## 2019-07-02 PROCEDURE — 73564 X-RAY EXAM KNEE 4 OR MORE: CPT

## 2019-07-02 PROCEDURE — 87491 CHLMYD TRACH DNA AMP PROBE: CPT

## 2019-07-02 PROCEDURE — 74011250636 HC RX REV CODE- 250/636: Performed by: PHYSICIAN ASSISTANT

## 2019-07-02 PROCEDURE — 96360 HYDRATION IV INFUSION INIT: CPT

## 2019-07-02 RX ORDER — CEPHALEXIN 500 MG/1
500 CAPSULE ORAL 2 TIMES DAILY
Qty: 14 CAP | Refills: 0 | Status: SHIPPED | OUTPATIENT
Start: 2019-07-02 | End: 2019-07-09

## 2019-07-02 RX ORDER — METRONIDAZOLE 500 MG/1
500 TABLET ORAL 2 TIMES DAILY
Qty: 14 TAB | Refills: 0 | Status: SHIPPED | OUTPATIENT
Start: 2019-07-02 | End: 2019-07-09

## 2019-07-02 RX ORDER — IBUPROFEN 600 MG/1
600 TABLET ORAL
Qty: 20 TAB | Refills: 0 | Status: SHIPPED | OUTPATIENT
Start: 2019-07-02 | End: 2021-09-04

## 2019-07-02 RX ADMIN — SODIUM CHLORIDE 1000 ML: 900 INJECTION, SOLUTION INTRAVENOUS at 10:17

## 2019-07-02 NOTE — ED PROVIDER NOTES
EMERGENCY DEPARTMENT HISTORY AND PHYSICAL EXAM    9:16 AM      Date: 7/2/2019  Patient Name: Jesenia Altamirano    History of Presenting Illness     No chief complaint on file. History Provided By: Patient    Chief Complaint: right knee pain, vaginal pain  Duration: 2 Months  Timing:  Acute  Location:   Quality: Aching  Severity: Moderate  Modifying Factors:   Associated Symptoms: denies any other associated signs or symptoms      Additional History (Context):Marleny Rojas is a 52 y.o. female who presents to the emergency department for evaluation of right knee pain that radiates down the front of her right lower leg x2 months. She would also like to be evaluated for intermittent vaginal pain x1 month. Patient denies other pain currently. She has made no attempts to treat her symptoms at home. No vaginal discharge, urinary symptoms, pain with intercourse, concerns for sexually transmitted infections, leg swelling, recent injury or trauma. No other complaints at this time. PCP:  None      Current Outpatient Medications   Medication Sig Dispense Refill    ibuprofen (MOTRIN) 600 mg tablet Take 1 Tab by mouth every eight (8) hours as needed for Pain. 20 Tab 0    metroNIDAZOLE (FLAGYL) 500 mg tablet Take 1 Tab by mouth two (2) times a day for 7 days. 14 Tab 0    cephALEXin (KEFLEX) 500 mg capsule Take 1 Cap by mouth two (2) times a day for 7 days. 14 Cap 0    azithromycin (ZITHROMAX Z-SELVIN) 250 mg tablet Take two tablets the first day and then one every day thereafter until completion 6 Tab 0    aspirin delayed-release 81 mg tablet Take 1 Tab by mouth daily. 30 Tab 0    atorvastatin (LIPITOR) 20 mg tablet Take 1 Tab by mouth nightly. 30 Tab 0    folic acid (FOLVITE) 1 mg tablet Take 1 Tab by mouth daily. 30 Tab 0    therapeutic multivitamin (THERAGRAN) tablet Take 1 Tab by mouth daily. 30 Tab 0    thiamine (B-1) 100 mg tablet Take 1 Tab by mouth daily.  30 Tab 0    amLODIPine (NORVASC) 5 mg tablet Take 0.5 Tabs by mouth daily. Indications: hypertension 30 Tab 0    docusate sodium (COLACE) 100 mg capsule Take 1 capsule twice daily 60 Cap 0    promethazine (PHENERGAN) 25 mg tablet Take 1 Tab by mouth every six (6) hours as needed. Indications: Nausea/Vomiting 12 Tab 0       Past History     Past Medical History:  History reviewed. No pertinent past medical history. Past Surgical History:  History reviewed. No pertinent surgical history. Family History:  History reviewed. No pertinent family history. Social History:  Social History     Tobacco Use    Smoking status: Never Smoker    Smokeless tobacco: Never Used   Substance Use Topics    Alcohol use: Yes     Comment: occational beer    Drug use: No       Allergies:  No Known Allergies      Review of Systems       Review of Systems   Constitutional: Negative for chills and fever. HENT: Negative for congestion, rhinorrhea and sore throat. Respiratory: Negative for cough and shortness of breath. Cardiovascular: Negative for chest pain. Gastrointestinal: Negative for abdominal pain, blood in stool, constipation, diarrhea, nausea and vomiting. Genitourinary: Positive for vaginal pain. Negative for dysuria, frequency and hematuria. Musculoskeletal: Positive for myalgias. Negative for back pain. Skin: Negative for rash and wound. Neurological: Negative for dizziness and headaches. All other systems reviewed and are negative. Physical Exam     Visit Vitals  BP (!) 144/101 (BP 1 Location: Left arm, BP Patient Position: Sitting)   Pulse 66   Temp 98.1 °F (36.7 °C)   Resp 16   Ht 5' 4\" (1.626 m)   Wt 55.8 kg (123 lb)   SpO2 100%   BMI 21.11 kg/m²       Physical Exam   Constitutional: She is oriented to person, place, and time. She appears well-developed and well-nourished. No distress. HENT:   Head: Normocephalic and atraumatic. Eyes: Conjunctivae are normal.   Neck: Normal range of motion. Neck supple.    Cardiovascular: Normal rate, regular rhythm and normal heart sounds. Pulmonary/Chest: Effort normal and breath sounds normal. No respiratory distress. She exhibits no tenderness. Abdominal: Soft. Bowel sounds are normal. She exhibits no distension. There is no tenderness. There is no rebound and no guarding. Musculoskeletal: She exhibits no edema or deformity. Neurological: She is alert and oriented to person, place, and time. She has normal reflexes. Skin: Skin is warm and dry. She is not diaphoretic. Psychiatric: She has a normal mood and affect. Nursing note and vitals reviewed.       Diagnostic Study Results     Labs -  Recent Results (from the past 12 hour(s))   URINALYSIS W/ RFLX MICROSCOPIC    Collection Time: 07/02/19  9:29 AM   Result Value Ref Range    Color YELLOW      Appearance CLOUDY      Specific gravity 1.018 1.005 - 1.030      pH (UA) 6.5 5.0 - 8.0      Protein NEGATIVE  NEG mg/dL    Glucose NEGATIVE  NEG mg/dL    Ketone NEGATIVE  NEG mg/dL    Bilirubin NEGATIVE  NEG      Blood NEGATIVE  NEG      Urobilinogen 0.2 0.2 - 1.0 EU/dL    Nitrites NEGATIVE  NEG      Leukocyte Esterase TRACE (A) NEG     URINE MICROSCOPIC ONLY    Collection Time: 07/02/19  9:29 AM   Result Value Ref Range    WBC 5 to 10 0 - 4 /hpf    Epithelial cells 1+ 0 - 5 /lpf    Bacteria 3+ (A) NEG /hpf   WET PREP    Collection Time: 07/02/19  9:42 AM   Result Value Ref Range    Special Requests: NO SPECIAL REQUESTS      Wet prep MODERATE  CLUE CELLS PRESENT        Wet prep NO TRICHOMONAS SEEN      Wet prep NO YEAST SEEN     CBC WITH AUTOMATED DIFF    Collection Time: 07/02/19 10:06 AM   Result Value Ref Range    WBC 2.8 (L) 4.6 - 13.2 K/uL    RBC 3.77 (L) 4.20 - 5.30 M/uL    HGB 13.0 12.0 - 16.0 g/dL    HCT 37.7 35.0 - 45.0 %    .0 (H) 74.0 - 97.0 FL    MCH 34.5 (H) 24.0 - 34.0 PG    MCHC 34.5 31.0 - 37.0 g/dL    RDW 12.7 11.6 - 14.5 %    PLATELET 193 775 - 199 K/uL    MPV 9.5 9.2 - 11.8 FL    NEUTROPHILS 45 40 - 73 %    LYMPHOCYTES 46 21 - 52 %    MONOCYTES 7 3 - 10 %    EOSINOPHILS 2 0 - 5 %    BASOPHILS 0 0 - 2 %    ABS. NEUTROPHILS 1.3 (L) 1.8 - 8.0 K/UL    ABS. LYMPHOCYTES 1.3 0.9 - 3.6 K/UL    ABS. MONOCYTES 0.2 0.05 - 1.2 K/UL    ABS. EOSINOPHILS 0.1 0.0 - 0.4 K/UL    ABS. BASOPHILS 0.0 0.0 - 0.1 K/UL    DF AUTOMATED     METABOLIC PANEL, COMPREHENSIVE    Collection Time: 07/02/19 10:06 AM   Result Value Ref Range    Sodium 144 136 - 145 mmol/L    Potassium 3.9 3.5 - 5.5 mmol/L    Chloride 110 (H) 100 - 108 mmol/L    CO2 29 21 - 32 mmol/L    Anion gap 5 3.0 - 18 mmol/L    Glucose 57 (L) 74 - 99 mg/dL    BUN 15 7.0 - 18 MG/DL    Creatinine 0.74 0.6 - 1.3 MG/DL    BUN/Creatinine ratio 20 12 - 20      GFR est AA >60 >60 ml/min/1.73m2    GFR est non-AA >60 >60 ml/min/1.73m2    Calcium 8.8 8.5 - 10.1 MG/DL    Bilirubin, total 0.3 0.2 - 1.0 MG/DL    ALT (SGPT) 17 13 - 56 U/L    AST (SGOT) 15 15 - 37 U/L    Alk. phosphatase 78 45 - 117 U/L    Protein, total 7.4 6.4 - 8.2 g/dL    Albumin 3.6 3.4 - 5.0 g/dL    Globulin 3.8 2.0 - 4.0 g/dL    A-G Ratio 0.9 0.8 - 1.7     D DIMER    Collection Time: 07/02/19 10:06 AM   Result Value Ref Range    D DIMER 0.29 <0.46 ug/ml(FEU)       Radiologic Studies -   Xr Knee Rt Min 4 V    Result Date: 7/2/2019  Right knee 4 views of the right knee were obtained. No fractures are identified. There is no evidence of joint effusion. There is mild narrowing of the joint spaces of the right knee. There is mild spurring of the tibial spines. IMPRESSION: Mild osteoarthritis of the right knee. No evidence of acute trauma or joint effusion        Medical Decision Making   I am the first provider for this patient. I reviewed the vital signs, available nursing notes, past medical history, past surgical history, family history and social history. Vital Signs-Reviewed the patient's vital signs.     Pulse Oximetry Analysis -  100% on room air (Interpretation)    Records Reviewed: Nursing Notes and Old Medical Records (Time of Review: 9:16 AM)    ED Course: Progress Notes, Reevaluation, and Consults:    Provider Notes (Medical Decision Making):   differential diagnosis: vaginitis, UTI, STI, OA, unlikely DVT, myalgia    Plan: Patient presents ambulatory in no acute distress with elevated blood pressure and otherwise normal vitals. Vaginal exam is unremarkable. Wet prep reveals BV. Urinalysis shows UTI. X-ray of knee is negative. CBC remarkable for leukopenia, unknown etiology. Patient informed of abnormal findings and need for follow-up. Will discharge home with Motrin for knee pain, Flagyl for BV, and Keflex for UTI. Follow-up with PCP. Patient now has Medicaid. She is encouraged to establish a PCP. At this time, patient is stable and appropriate for discharge home. Patient demonstrates understanding of current diagnoses and is in agreement with the treatment plan. They are advised that while the likelihood of serious underlying condition is low at this point given the evaluation performed today, we cannot fully rule it out. They are advised to immediately return with any new symptoms or worsening of current condition. All questions have been answered. Patient is given educational material regarding their diagnoses, including danger symptoms and when to return to the ED. Diagnosis     Clinical Impression:   1. Acute cystitis without hematuria    2. Acute pain of right knee    3. BV (bacterial vaginosis)    4. Leukopenia, unspecified type    5.  Elevated blood pressure reading        Disposition: DC Home    Follow-up Information     Follow up With Specialties Details Why Brendasy  Call in 2 days For follow-up [place] [id number] [address] [facility] EMERGENCY DEPT Emergency Medicine Go to As needed, If symptoms worsen [address] [place] [id number]  [phone]           Patient's Medications   Start Taking    CEPHALEXIN [facility] 500 MG CAPSULE    Take 1 Cap by mouth two (2) times a day for 7 days. IBUPROFEN (MOTRIN) 600 MG TABLET    Take 1 Tab by mouth every eight (8) hours as needed for Pain. METRONIDAZOLE (FLAGYL) 500 MG TABLET    Take 1 Tab by mouth two (2) times a day for 7 days. Continue Taking    AMLODIPINE (NORVASC) 5 MG TABLET    Take 0.5 Tabs by mouth daily. Indications: hypertension    ASPIRIN DELAYED-RELEASE 81 MG TABLET    Take 1 Tab by mouth daily. ATORVASTATIN (LIPITOR) 20 MG TABLET    Take 1 Tab by mouth nightly. AZITHROMYCIN (ZITHROMAX Z-SELVIN) 250 MG TABLET    Take two tablets the first day and then one every day thereafter until completion    DOCUSATE SODIUM (COLACE) 100 MG CAPSULE    Take 1 capsule twice daily    FOLIC ACID (FOLVITE) 1 MG TABLET    Take 1 Tab by mouth daily. PROMETHAZINE (PHENERGAN) 25 MG TABLET    Take 1 Tab by mouth every six (6) hours as needed. Indications: Nausea/Vomiting    THERAPEUTIC MULTIVITAMIN (THERAGRAN) TABLET    Take 1 Tab by mouth daily. THIAMINE (B-1) 100 MG TABLET    Take 1 Tab by mouth daily.    These Medications have changed    No medications on file   Stop Taking    No medications on file     _______________________________

## 2019-07-02 NOTE — DISCHARGE INSTRUCTIONS
Patient Education      Please return immediately to the Emergency Room for re-evaluation if you are not improving, develop any new symptoms, or develop worsening of current symptoms! If you have been prescribed a medication and are unable to take this medication for any reason, please return to the Emergency Department for further evaluation! If you have been referred for follow-up to a specialist, but are unable to follow-up and your symptoms are either not improving or are worsening, please return to the Emergency Department for further evaluation! Elevated Blood Pressure: Care Instructions  Your Care Instructions    Blood pressure is a measure of how hard the blood pushes against the walls of your arteries. It's normal for blood pressure to go up and down throughout the day. But if it stays up over time, you have high blood pressure. Two numbers tell you your blood pressure. The first number is the systolic pressure. It shows how hard the blood pushes when your heart is pumping. The second number is the diastolic pressure. It shows how hard the blood pushes between heartbeats, when your heart is relaxed and filling with blood. An ideal blood pressure in adults is less than 120/80 (say \"120 over 80\"). High blood pressure is 140/90 or higher. You have high blood pressure if your top number is 140 or higher or your bottom number is 90 or higher, or both. The main test for high blood pressure is simple, fast, and painless. To diagnose high blood pressure, your doctor will test your blood pressure at different times. After testing your blood pressure, your doctor may ask you to test it again when you are home. If you are diagnosed with high blood pressure, you can work with your doctor to make a long-term plan to manage it. Follow-up care is a key part of your treatment and safety. Be sure to make and go to all appointments, and call your doctor if you are having problems.  It's also a good idea to know your test results and keep a list of the medicines you take. How can you care for yourself at home? · Do not smoke. Smoking increases your risk for heart attack and stroke. If you need help quitting, talk to your doctor about stop-smoking programs and medicines. These can increase your chances of quitting for good. · Stay at a healthy weight. · Try to limit how much sodium you eat to less than 2,300 milligrams (mg) a day. Your doctor may ask you to try to eat less than 1,500 mg a day. · Be physically active. Get at least 30 minutes of exercise on most days of the week. Walking is a good choice. You also may want to do other activities, such as running, swimming, cycling, or playing tennis or team sports. · Avoid or limit alcohol. Talk to your doctor about whether you can drink any alcohol. · Eat plenty of fruits, vegetables, and low-fat dairy products. Eat less saturated and total fats. · Learn how to check your blood pressure at home. When should you call for help? Call your doctor now or seek immediate medical care if:  ? · Your blood pressure is much higher than normal (such as 180/110 or higher). ? · You think high blood pressure is causing symptoms such as:  ¨ Severe headache. ¨ Blurry vision. ? Watch closely for changes in your health, and be sure to contact your doctor if:  ? · You do not get better as expected. Where can you learn more? Go to http://allie-francine.info/. Enter K642 in the search box to learn more about \"Elevated Blood Pressure: Care Instructions. \"  Current as of: September 21, 2016  Content Version: 11.4  © 9216-3837 [employer]. Care instructions adapted under license by [employer] (which disclaims liability or warranty for this information).  If you have questions about a medical condition or this instruction, always ask your healthcare professional. [address] any warranty or liability for your use of this information. Patient Education        Bacterial Vaginosis: Care Instructions  Your Care Instructions    Bacterial vaginosis is a type of vaginal infection. It is caused by excess growth of certain bacteria that are normally found in the vagina. Symptoms can include itching, swelling, pain when you urinate or have sex, and a gray or yellow discharge with a \"fishy\" odor. It is not considered an infection that is spread through sexual contact. Although symptoms can be annoying and uncomfortable, bacterial vaginosis does not usually cause other health problems. However, if you have it while you are pregnant, it can cause complications. While the infection may go away on its own, most doctors use antibiotics to treat it. You may have been prescribed pills or vaginal cream. With treatment, bacterial vaginosis usually clears up in 5 to 7 days. Follow-up care is a key part of your treatment and safety. Be sure to make and go to all appointments, and call your doctor if you are having problems. It's also a good idea to know your test results and keep a list of the medicines you take. How can you care for yourself at home? · Take your antibiotics as directed. Do not stop taking them just because you feel better. You need to take the full course of antibiotics. · Do not eat or drink anything that contains alcohol if you are taking metronidazole (Flagyl). · Keep using your medicine if you start your period. Use pads instead of tampons while using a vaginal cream or suppository. Tampons can absorb the medicine. · Wear loose cotton clothing. Do not wear nylon and other materials that hold body heat and moisture close to the skin. · Do not scratch. Relieve itching with a cold pack or a cool bath. · Do not wash your vaginal area more than once a day. Use plain water or a mild, unscented soap. Do not douche. When should you call for help?   Watch closely for changes in your health, and be sure to contact your doctor if:    · You have unexpected vaginal bleeding.     · You have a fever.     · You have new or increased pain in your vagina or pelvis.     · You are not getting better after 1 week.     · Your symptoms return after you finish the course of your medicine. Where can you learn more? Go to http://momo.info/. Pati Close in the search box to learn more about \"Bacterial Vaginosis: Care Instructions. \"  Current as of: May 14, 2018  Content Version: 11.9  © 7995-8085 [employer]. Care instructions adapted under license by [facility] (which disclaims liability or warranty for this information). If you have questions about a medical condition or this instruction, always ask your healthcare professional. [address] any warranty or liability for your use of this information. Patient Education        Knee Pain or Injury: Care Instructions  Your Care Instructions    Injuries are a common cause of knee problems. Sudden (acute) injuries may be caused by a direct blow to the knee. They can also be caused by abnormal twisting, bending, or falling on the knee. Pain, bruising, or swelling may be severe, and may start within minutes of the injury. Overuse is another cause of knee pain. Other causes are climbing stairs, kneeling, and other activities that use the knee. Everyday wear and tear, especially as you get older, also can cause knee pain. Rest, along with home treatment, often relieves pain and allows your knee to heal. If you have a serious knee injury, you may need tests and treatment. Follow-up care is a key part of your treatment and safety. Be sure to make and go to all appointments, and call your doctor if you are having problems. It's also a good idea to know your test results and keep a list of the medicines you take. How can you care for yourself at home? · Be safe with medicines. Read and follow all instructions on the label.   ? If the doctor gave you a prescription medicine for pain, take it as prescribed. ? If you are not taking a prescription pain medicine, ask your doctor if you can take an over-the-counter medicine. · Rest and protect your knee. Take a break from any activity that may cause pain. · Put ice or a cold pack on your knee for 10 to 20 minutes at a time. Put a thin cloth between the ice and your skin. · Prop up a sore knee on a pillow when you ice it or anytime you sit or lie down for the next 3 days. Try to keep it above the level of your heart. This will help reduce swelling. · If your knee is not swollen, you can put moist heat, a heating pad, or a warm cloth on your knee. · If your doctor recommends an elastic bandage, sleeve, or other type of support for your knee, wear it as directed. · Follow your doctor's instructions about how much weight you can put on your leg. Use a cane, crutches, or a walker as instructed. · Follow your doctor's instructions about activity during your healing process. If you can do mild exercise, slowly increase your activity. · Reach and stay at a healthy weight. Extra weight can strain the joints, especially the knees and hips, and make the pain worse. Losing even a few pounds may help. When should you call for help? Call 911 anytime you think you may need emergency care. For example, call if:    · You have symptoms of a blood clot in your lung (called a pulmonary embolism). These may include:  ? Sudden chest pain. ? Trouble breathing. ? Coughing up blood.    Call your doctor now or seek immediate medical care if:    · You have severe or increasing pain.     · Your leg or foot turns cold or changes color.     · You cannot stand or put weight on your knee.     · Your knee looks twisted or bent out of shape.     · You cannot move your knee.     · You have signs of infection, such as:  ? Increased pain, swelling, warmth, or redness. ? Red streaks leading from the knee.   ? Pus draining from a place on your knee. ? A fever.     · You have signs of a blood clot in your leg (called a deep vein thrombosis), such as:  ? Pain in your calf, back of the knee, thigh, or groin. ? Redness and swelling in your leg or groin.    Watch closely for changes in your health, and be sure to contact your doctor if:    · You have tingling, weakness, or numbness in your knee.     · You have any new symptoms, such as swelling.     · You have bruises from a knee injury that last longer than 2 weeks.     · You do not get better as expected. Where can you learn more? Go to http://allie-francine.info/. Enter K195 in the search box to learn more about \"Knee Pain or Injury: Care Instructions. \"  Current as of: September 23, 2018  Content Version: 11.9  Patient Education        Neutropenia: Care Instructions  Your Care Instructions  Neutropenia (say \"psb-ebuf-VEN-nee-uh\") means that your blood has too few neutrophils. These are white blood cells that help protect the body from infection. They do this by killing bacteria. Neutropenia can be caused by some types of infection. It also can be caused by immune system conditions such as HIV or lupus, a lack of vitamin K53 or folic acid, or an enlarged spleen. Some medicines can cause it too. It is most often caused by treatments for certain health problems, such as chemotherapy and radiation treatment for cancer. Mild neutropenia usually causes no symptoms. But when it's severe, it increases the risk of infection of your skin and organs. That's because your body can't fight off germs as well as it should. Follow-up care is a key part of your treatment and safety. Be sure to make and go to all appointments, and call your doctor if you are having problems. It's also a good idea to know your test results and keep a list of the medicines you take. How can you care for yourself at home? · Take your medicines exactly as prescribed.  Call your doctor if you have any problems with your medicine. · Eat a healthy, balanced diet. Eat foods with a lot of fiber. This helps to prevent constipation. Prevent infections  · Take your temperature several times a day, as your doctor suggests. Keep a written record of your temperature readings. Fever is a common symptom of infection. And it may be the only symptom. · Use a soft toothbrush. Do not floss your teeth. Talk with your doctor about other steps to prevent infections in your mouth. · Wash your hands often with soap and water, especially before you eat and after you use the bathroom. · If you are a woman, use sanitary napkins (pads) instead of tampons. Do not douche. · Do not use rectal thermometers or suppositories. · Avoid tasks that might expose you to germs, such as disposing of pet feces or urine. · Avoid crowds of people and anyone who might have an infection or an illness such as a cold or the flu. You may need to avoid people who have recently had certain kinds of vaccinations. · Even small injuries can get infected. Take steps to prevent cuts, burns, and sunburns. · If you have severe neutropenia, your doctor may advise you to avoid fresh fruits, vegetables, and flowers. When should you call for help? Call 911 anytime you think you may need emergency care. For example, call if:    · You have severe shortness of breath.     · You passed out (lost consciousness).    Call your doctor now or seek immediate medical care if:    · You have signs of infection, such as:  ? Increased pain, swelling, warmth, or redness of your skin. ? Red streaks leading from a wound. ? Pus draining from a wound. ? A fever.    Watch closely for changes in your health, and be sure to contact your doctor if:    · You do not get better as expected. Where can you learn more? Go to http://allie-francine.info/. Enter J863 in the search box to learn more about \"Neutropenia: Care Instructions. \"  Current as of: June 25, 2018  Content Version: 11.9  © 8520-6224 [employer]. Care instructions adapted under license by [employer] (which disclaims liability or warranty for this information). If you have   Patient Education        Urinary Tract Infection in Women: Care Instructions  Your Care Instructions    A urinary tract infection, or UTI, is a general term for an infection anywhere between the kidneys and the urethra (where urine comes out). Most UTIs are bladder infections. They often cause pain or burning when you urinate. UTIs are caused by bacteria and can be cured with antibiotics. Be sure to complete your treatment so that the infection goes away. Follow-up care is a key part of your treatment and safety. Be sure to make and go to all appointments, and call your doctor if you are having problems. It's also a good idea to know your test results and keep a list of the medicines you take. How can you care for yourself at home? · Take your antibiotics as directed. Do not stop taking them just because you feel better. You need to take the full course of antibiotics. · Drink extra water and other fluids for the next day or two. This may help wash out the bacteria that are causing the infection. (If you have kidney, heart, or liver disease and have to limit fluids, talk with your doctor before you increase your fluid intake.)  · Avoid drinks that are carbonated or have caffeine. They can irritate the bladder. · Urinate often. Try to empty your bladder each time. · To relieve pain, take a hot bath or lay a heating pad set on low over your lower belly or genital area. Never go to sleep with a heating pad in place. To prevent UTIs  · Drink plenty of water each day. This helps you urinate often, which clears bacteria from your system. (If you have kidney, heart, or liver disease and have to limit fluids, talk with your doctor before you increase your fluid intake.)  · Urinate when you need to.   · Urinate right after you have sex. · Change sanitary pads often. · Avoid douches, bubble baths, feminine hygiene sprays, and other feminine hygiene products that have deodorants. · After going to the bathroom, wipe from front to back. When should you call for help? Call your doctor now or seek immediate medical care if:    · Symptoms such as fever, chills, nausea, or vomiting get worse or appear for the first time.     · You have new pain in your back just below your rib cage. This is called flank pain.     · There is new blood or pus in your urine.     · You have any problems with your antibiotic medicine.    Watch closely for changes in your health, and be sure to contact your doctor if:    · You are not getting better after taking an antibiotic for 2 days.     · Your symptoms go away but then come back. Where can you learn more? Go to http://allie-francine.info/. Enter F074 in the search box to learn more about \"Urinary Tract Infection in Women: Care Instructions. \"  Current as of: March 20, 2018  Content Version: 11.9  © 4606-5915 [employer]. Care instructions adapted under license by [employer] (which disclaims liability or warranty for this information). If you have questions about a medical condition or this instruction, always ask your healthcare professional. [address] any warranty or liability for your use of this information. questions about a medical condition or this instruction, always ask your healthcare professional. [address] any warranty or liability for your use of this information. © 6611-2345 [employer]. Care instructions adapted under license by [employer] (which disclaims liability or warranty for this information).  If you have questions about a medical condition or this instruction, always ask your healthcare professional. [address] any warranty or liability for your use of this information.

## 2019-07-03 LAB
C TRACH RRNA SPEC QL NAA+PROBE: NEGATIVE
N GONORRHOEA RRNA SPEC QL NAA+PROBE: NEGATIVE
SPECIMEN SOURCE: NORMAL

## 2019-10-04 ENCOUNTER — HOSPITAL ENCOUNTER (OUTPATIENT)
Dept: MAMMOGRAPHY | Age: 54
Discharge: HOME | End: 2019-10-04
Payer: MEDICAID

## 2019-10-04 DIAGNOSIS — Z12.39 SCREENING FOR BREAST CANCER: ICD-10-CM

## 2019-10-04 PROCEDURE — 77067 SCR MAMMO BI INCL CAD: CPT

## 2019-10-14 ENCOUNTER — HOSPITAL ENCOUNTER (EMERGENCY)
Age: 54
Discharge: HOME | End: 2019-10-14
Attending: EMERGENCY MEDICINE
Payer: MEDICAID

## 2019-10-14 VITALS
RESPIRATION RATE: 16 BRPM | HEART RATE: 98 BPM | OXYGEN SATURATION: 98 % | SYSTOLIC BLOOD PRESSURE: 127 MMHG | DIASTOLIC BLOOD PRESSURE: 96 MMHG

## 2019-10-14 DIAGNOSIS — R87.619 ABNORMAL CERVICAL PAPANICOLAOU SMEAR, UNSPECIFIED ABNORMAL PAP FINDING: ICD-10-CM

## 2019-10-14 DIAGNOSIS — N39.0 ACUTE UTI: Primary | ICD-10-CM

## 2019-10-14 LAB
APPEARANCE UR: CLEAR
BACTERIA URNS QL MICRO: ABNORMAL /HPF
BILIRUB UR QL: NEGATIVE
COLOR UR: YELLOW
EPITH CASTS URNS QL MICRO: ABNORMAL /LPF (ref 0–5)
GLUCOSE UR STRIP.AUTO-MCNC: NEGATIVE MG/DL
HGB UR QL STRIP: NEGATIVE
KETONES UR QL STRIP.AUTO: NEGATIVE MG/DL
LEUKOCYTE ESTERASE UR QL STRIP.AUTO: ABNORMAL
NITRITE UR QL STRIP.AUTO: POSITIVE
PH UR STRIP: 7 [PH] (ref 5–8)
PROT UR STRIP-MCNC: NEGATIVE MG/DL
RBC #/AREA URNS HPF: NEGATIVE /HPF (ref 0–5)
SP GR UR REFRACTOMETRY: 1.01 (ref 1–1.03)
UROBILINOGEN UR QL STRIP.AUTO: 0.2 EU/DL (ref 0.2–1)
WBC URNS QL MICRO: ABNORMAL /HPF (ref 0–4)

## 2019-10-14 PROCEDURE — 99283 EMERGENCY DEPT VISIT LOW MDM: CPT

## 2019-10-14 PROCEDURE — 81001 URINALYSIS AUTO W/SCOPE: CPT

## 2019-10-14 RX ORDER — CEPHALEXIN 500 MG/1
500 CAPSULE ORAL 4 TIMES DAILY
Qty: 28 CAP | Refills: 0 | Status: SHIPPED | OUTPATIENT
Start: 2019-10-14 | End: 2019-10-21

## 2019-10-14 NOTE — ED NOTES
received a referral from ED to assist patient with scheduling follow up care with ob/gyn. Patient assessed while in FT/4. Patient currently insured and under the care of a primary care doctor. Patient has an appointment scheduled at [facility] ([address] [place] [address]) on 10/21/2019 at 12:45 pm with Dr Irineo Phelps. Patient handed an appointment reminder letter. Patient handed  contact information for further assistance. Patient states she does have transportation to the appointment. I will follow up with patient after the appointment.

## 2019-10-14 NOTE — ED TRIAGE NOTES
Pt arrived via triage from home with complaints of vaginal pain. Pt states \"this used to happen and its happening again\". Pt received a letter from her OBGYN that she had an abnormal pap smear but did not follow up.  Pt denies any bleeding

## 2019-10-14 NOTE — ED PROVIDER NOTES
EMERGENCY DEPARTMENT HISTORY AND PHYSICAL EXAM    Date: 10/14/2019  Patient Name: Theodora Warner    History of Presenting Illness     Chief Complaint   Patient presents with    Vaginal Pain         History Provided By: Patient        Additional History (Context): Theodora Warner is a 52 y.o. female with hypertension and hyperlipidemia who presents with vaginal pain. She has had intermittent vaginal pain for several months. She went to the [facility]  for a Pap smear and received a letter in the mail and that they have been unsuccessful in trying to reach her by telephone stating she had abnormal Pap smear test results. Denies vaginal discharge, genital lesions. Has intermittent low back pain. PCP: Nicki Combs MD    Current Outpatient Medications   Medication Sig Dispense Refill    cephALEXin (KEFLEX) 500 mg capsule Take 1 Cap by mouth four (4) times daily for 7 days. 28 Cap 0    ibuprofen (MOTRIN) 600 mg tablet Take 1 Tab by mouth every eight (8) hours as needed for Pain. 20 Tab 0    azithromycin (ZITHROMAX Z-SELVIN) 250 mg tablet Take two tablets the first day and then one every day thereafter until completion 6 Tab 0    aspirin delayed-release 81 mg tablet Take 1 Tab by mouth daily. 30 Tab 0    atorvastatin (LIPITOR) 20 mg tablet Take 1 Tab by mouth nightly. 30 Tab 0    folic acid (FOLVITE) 1 mg tablet Take 1 Tab by mouth daily. 30 Tab 0    therapeutic multivitamin (THERAGRAN) tablet Take 1 Tab by mouth daily. 30 Tab 0    thiamine (B-1) 100 mg tablet Take 1 Tab by mouth daily. 30 Tab 0    amLODIPine (NORVASC) 5 mg tablet Take 0.5 Tabs by mouth daily. Indications: hypertension 30 Tab 0    docusate sodium (COLACE) 100 mg capsule Take 1 capsule twice daily 60 Cap 0    promethazine (PHENERGAN) 25 mg tablet Take 1 Tab by mouth every six (6) hours as needed.  Indications: Nausea/Vomiting 12 Tab 0       Past History     Past Medical History:  No past medical history on file.    Past Surgical History:  No past surgical history on file. Family History:  No family history on file. Social History:  Social History     Tobacco Use    Smoking status: Never Smoker    Smokeless tobacco: Never Used   Substance Use Topics    Alcohol use: Yes     Comment: occational beer    Drug use: No       Allergies:  No Known Allergies      Review of Systems   Review of Systems   Genitourinary: Positive for vaginal pain. Negative for dysuria, flank pain and pelvic pain. Musculoskeletal: Positive for back pain. All Other Systems Negative  Physical Exam     Vitals:    10/14/19 0943 10/14/19 0945   BP:  (!) 127/96   Pulse: 98    Resp: 16    SpO2: 98%      Physical Exam   Constitutional: She is oriented to person, place, and time. She appears well-developed. HENT:   Head: Normocephalic and atraumatic. Eyes: Pupils are equal, round, and reactive to light. Neck: No JVD present. No tracheal deviation present. No thyromegaly present. Cardiovascular: Normal rate, regular rhythm and normal heart sounds. Exam reveals no gallop and no friction rub. No murmur heard. Pulmonary/Chest: Effort normal and breath sounds normal. No stridor. No respiratory distress. She has no wheezes. She has no rales. She exhibits no tenderness. Abdominal: Soft. She exhibits no distension and no mass. There is no tenderness. There is no rebound and no guarding. Musculoskeletal: She exhibits no edema or tenderness. Lymphadenopathy:     She has no cervical adenopathy. Neurological: She is alert and oriented to person, place, and time. Skin: Skin is warm and dry. No rash noted. No erythema. No pallor. Psychiatric: She has a normal mood and affect. Her behavior is normal. Thought content normal.   Nursing note and vitals reviewed.        Diagnostic Study Results     Labs -     Recent Results (from the past 12 hour(s))   URINALYSIS W/ RFLX MICROSCOPIC    Collection Time: 10/14/19 10:30 AM   Result Value Ref Range    Color YELLOW      Appearance CLEAR      Specific gravity 1.012 1.005 - 1.030      pH (UA) 7.0 5.0 - 8.0      Protein NEGATIVE  NEG mg/dL    Glucose NEGATIVE  NEG mg/dL    Ketone NEGATIVE  NEG mg/dL    Bilirubin NEGATIVE  NEG      Blood NEGATIVE  NEG      Urobilinogen 0.2 0.2 - 1.0 EU/dL    Nitrites POSITIVE (A) NEG      Leukocyte Esterase SMALL (A) NEG         Radiologic Studies -   No orders to display     CT Results  (Last 48 hours)    None        CXR Results  (Last 48 hours)    None            Medical Decision Making   I am the first provider for this patient. I reviewed the vital signs, available nursing notes, past medical history, past surgical history, family history and social history. Vital Signs-Reviewed the patient's vital signs. Procedures:  Pelvic Exam  Date/Time: 10/14/2019 10:38 AM  Performed by: PA  Procedure duration:  2 minutes. Type of exam performed: speculum and bimanual.    External genitalia appearance: normal.    Vaginal exam:  normal.    Cervical exam:  abnormal (Patient has a reddened flattened lesions at 1 and 2:00 as well as 6 8 and 9:00.). Specimen(s) collected:  none. Bimanual exam:  normal.    Patient tolerance: Patient tolerated the procedure well with no immediate complications          Provider Notes (Medical Decision Making):   Asked  to make her an appointment for the him to [facility] [id number] for patient's follow-up of an abnormal Pap smear result. I called over to the [facility] and spoke with the  but was unable to speak with the nurse directly and so I left a message. Patient received a letter indicating an abnormal test result and they want her to do follow-up with the [facility]. Explained this to patient as well as the  who is coming to speak with the patient to ensure follow-up occurs. Treat UTI.       MED RECONCILIATION:  No current facility-administered medications for this encounter. Current Outpatient Medications   Medication Sig    cephALEXin (KEFLEX) 500 mg capsule Take 1 Cap by mouth four (4) times daily for 7 days.  ibuprofen (MOTRIN) 600 mg tablet Take 1 Tab by mouth every eight (8) hours as needed for Pain.  azithromycin (ZITHROMAX Z-SELVIN) 250 mg tablet Take two tablets the first day and then one every day thereafter until completion    aspirin delayed-release 81 mg tablet Take 1 Tab by mouth daily.  atorvastatin (LIPITOR) 20 mg tablet Take 1 Tab by mouth nightly.  folic acid (FOLVITE) 1 mg tablet Take 1 Tab by mouth daily.  therapeutic multivitamin (THERAGRAN) tablet Take 1 Tab by mouth daily.  thiamine (B-1) 100 mg tablet Take 1 Tab by mouth daily.  amLODIPine (NORVASC) 5 mg tablet Take 0.5 Tabs by mouth daily. Indications: hypertension    docusate sodium (COLACE) 100 mg capsule Take 1 capsule twice daily    promethazine (PHENERGAN) 25 mg tablet Take 1 Tab by mouth every six (6) hours as needed. Indications: Nausea/Vomiting       Disposition:  home    DISCHARGE NOTE:   10:58 AM    Pt has been reexamined. Patient has no new complaints, changes, or physical findings. Care plan outlined and precautions discussed. Results of labs were reviewed with the patient. All medications were reviewed with the patient; will d/c home with keflex. All of pt's questions and concerns were addressed. Patient was instructed and agrees to follow up with Cindy Desir, as well as to return to the ED upon further deterioration. Patient is ready to go home.     Follow-up Information     Follow up With Specialties Details Why [address] [facility]  On 10/21/2019 Appointment time: 12:45pm with Dr Yanely Desir [address] [place] [id number]  [address] [place] [address]  On 10/18/2019 Appointmetn and Transportation follow up [address]    [facility] EMERGENCY DEPT Emergency Medicine  If symptoms worsen return immediately [address] [facility] [address]  [phone]          Current Discharge Medication List      START taking these medications    Details   cephALEXin (KEFLEX) 500 mg capsule Take 1 Cap by mouth four (4) times daily for 7 days. Qty: 28 Cap, Refills: 0                 Diagnosis     Clinical Impression:   1. Acute UTI    2.  Abnormal cervical Papanicolaou smear, unspecified abnormal pap finding

## 2019-10-14 NOTE — DISCHARGE INSTRUCTIONS

## 2019-10-21 ENCOUNTER — OFFICE VISIT (OUTPATIENT)
Dept: OBGYN CLINIC | Age: 54
End: 2019-10-21

## 2019-10-21 VITALS
WEIGHT: 122.6 LBS | RESPIRATION RATE: 20 BRPM | HEART RATE: 71 BPM | SYSTOLIC BLOOD PRESSURE: 127 MMHG | BODY MASS INDEX: 20.93 KG/M2 | DIASTOLIC BLOOD PRESSURE: 95 MMHG | HEIGHT: 64 IN

## 2019-10-21 DIAGNOSIS — N30.00 ACUTE CYSTITIS WITHOUT HEMATURIA: Primary | ICD-10-CM

## 2019-10-21 LAB
BILIRUB UR QL STRIP: NEGATIVE
GLUCOSE UR-MCNC: NEGATIVE MG/DL
KETONES P FAST UR STRIP-MCNC: NEGATIVE MG/DL
PH UR STRIP: 6 [PH] (ref 4.6–8)
PROT UR QL STRIP: NEGATIVE
SP GR UR STRIP: 1.03 (ref 1–1.03)
UA UROBILINOGEN AMB POC: NORMAL (ref 0.2–1)
URINALYSIS CLARITY POC: CLEAR
URINALYSIS COLOR POC: YELLOW
URINE BLOOD POC: NEGATIVE
URINE LEUKOCYTES POC: NEGATIVE
URINE NITRITES POC: NEGATIVE

## 2019-10-21 NOTE — PROGRESS NOTES
MRN:[id number]                      After Visit Summary   2/15/2018    Kaylie Armstrong    MRN: [id number]           Thank you!     Thank you for choosing [facility] for your care. Our goal is always to provide you with excellent care. Hearing back from our patients is one way we can continue to improve our services. Please take a few minutes to complete the written survey that you may receive in the mail after you visit with us. Thank you!        Patient Information     Date Of Birth          2019        Designated Caregiver       Most Recent Value    Caregiver    Will someone help with your care after discharge? yes    Name of designated caregiver Nanette Arguello    Phone number of caregiver [phone]    Caregiver address [address] [place] [address] [address] [place] [address]      About your child's hospital stay     Your child was admitted on:  February 15, 2018 Your child last received care in the:  [facility] Pediatric BMT Unit    Your child was discharged on:  June 20, 2018       Who to Call     For medical emergencies, please call 911.  For non-urgent questions about your medical care, please call your primary care provider or clinic, [phone]          Attending Provider     Provider Specialty    Vern Bone MD Pediatrics    [facility], Leti Gardner MD Pediatric Hematology-Oncology    [place], Wally ROY MD Pediatric Hematology-Oncology    [place], Eun ISABEL MD Pediatric Hematology-Oncology    [place], Diandra Taylor MD Pediatric Hematology-Oncology    [facility], Candelaria BABCOCK MD Pediatric Hematology-Oncology       Primary Care Provider Office Phone # Fax #    Pita DESTINI Alvarez [phone]      Your next 10 appointments already scheduled     Jun 21, 2018  8:00 AM CDT   [facility] Bmt Peds Return with KARL Kamara CNP   Peds Blood and Marrow Transplant ([facility])    [facility]  9th Floor  [address] [facility]  1. Have you been to the ER, urgent care clinic since your last visit? Hospitalized since your last visit? No    2. Have you seen or consulted any other health care providers outside of the [address] [place] [address] since your last visit? Include any pap smears or colon screening.  No [id number]   [phone]            Jun 21, 2018 11:00 AM CDT   Peds BMT Eval with Jayne Roman, OTR   [facility] - Outpatient ([facility])    [address] [place] [address] [address] [address]   [phone]            Jun 22, 2018 11:00 AM CDT   [facility] Bmt Peds Anniversary Visit with Darlene Lares APRN CNP   Peds Blood and Marrow Transplant ([facility])    [place] [address] [address]   [phone]            Jun 25, 2018  3:00 PM CDT   Peds BMT Eval with Lachelle Cook, PT   [facility] - Outpatient ([facility])    [address] [place] [address] [address] [address]   [phone]            Jun 26, 2018 10:30 AM CDT   [facility] Peds Infusion 180 with [facility] PEDS INFUSION CHAIR 11   Peds IV Infusion ([facility])    [place] [address] [address]   [phone]            Jun 26, 2018 12:30 PM CDT   [facility] Bmt Peds Return with Diandra Biswas MD   Peds Blood and Marrow Transplant ([facility])    [place] [address] Floor  [address] [place] [address]   [phone]            Jul 03, 2018 12:00 PM CDT   [facility] Bmt Peds Anniversary Visit with Diandra Biswas MD   Peds Blood and Marrow Transplant ([facility])    [place] [address]  [address] [place] [address]   [phone]            Jul 10, 2018 11:00 AM CDT   [facility] Bmt Peds Return with Diandra Biswas MD   Peds Blood and Marrow Transplant ([facility])    [place] [address]  [address] [place] [address]   [phone]            Jul 10, 2018 11:30 AM CDT   [facility] Peds Infusion 180 with [facility] PEDS INFUSION CHAIR 13   Peds IV Infusion ([facility])    [facility]  9th  "Floor  [address] [place] [address]   [phone]            Jul 17, 2018 10:00 AM CDT   [facility] Bmt Peds Return with Diandra Biswas MD   Peds Blood and Marrow Transplant ([facility])    [facility]  9th Floor  [address] [place] [address]   [phone]              Additional Services     OCCUPATIONAL THERAPY REFERRAL       *This therapy referral will be filtered to a centralized scheduling office at [facility] and the patient will receive a call to schedule an appointment at a [place] location most convenient for them. *     [facility] provides Occupational Therapy evaluation and treatment and many specialty services across the [facility].  If requesting a specialty program, please choose from the list below.    If you have not heard from the scheduling office within 2 business days, please call [phone] for all locations, with the exception of [place], please call [phone] and [facility], please call [phone]    Treatment: Evaluation & Treatment  Special Instructions/Modalities: none  Special Programs: Pediatric Rehabilitation    Please be aware that coverage of these services is subject to the terms and limitations of your health insurance plan.  Call member services at your health plan with any benefit or coverage questions.      **Note to Provider:  If you are referring outside of [place] for the therapy appointment, please list the name of the location in the \"special instructions\" above, print the referral and give to the patient to schedule the appointment.            PHYSICAL THERAPY REFERRAL       *This therapy referral will be filtered to a centralized scheduling office at [facility] and the patient will receive a call to schedule an appointment at a [place] location most convenient for them. *     [facility] provides Physical Therapy " "evaluation and treatment and many specialty services across the [facility] system.  If requesting a specialty program, please choose from the list below.    If you have not heard from the scheduling office within 2 business days, please call [phone] for all locations, with the exception of [place], please call [phone] and [place], please call [phone]  Treatment: Evaluation & Treatment  Special Instructions/Modalities: none  Special Programs: Pediatric Rehabilitation     Please be aware that coverage of these services is subject to the terms and limitations of your health insurance plan.  Call member services at your health plan with any benefit or coverage questions.      **Note to Provider:  If you are referring outside of [facility] for the therapy appointment, please list the name of the location in the \"special instructions\" above, print the referral and give to the patient to schedule the appointment.                  Future tests that were ordered for you     CBC with platelets differential       Last Lab Result: Hemoglobin (g/dL)       Date                     Value                 06/18/2018               8.9 (L)          ----------            Basic metabolic panel           Cyclosporine           Phosphorus                 Further instructions from your care team       BMT Pediatric Summary of Care    This note has data from a flowsheet    June 18, 2018 2:06 PM  Kaylie Armstrong  MRN: [id number]    Discharge Date: 6/19/18    BMT Primary Physician: Diandra Biswas MD      BMT Nurse Coordinator: Christo Cerrato RN    Discharge Diagnosis: S/P BMT    Discharge To: Home    Activity: up ad juan, allogeneic restrictions (hand washing, mask wearing, avoidance of crowds)    Catheter Care: Alan    Vascular Access Device Protocol Per Policy  Supplies through Home Infusion (Please supply central line dressing kits for weekly dressing changes).  [facility]  Fax: [phone]  Ph: [phone] "     [facility] to set-up weekly skilled nursing visits to assist with central line dressing changes until parents express comfort. [facility] to draw a CBC w/platelets every Friday starting 6/29/18.    IV Medications through home infusion: IV GCSF     Nutrition: Regular diet as tolerated-- similac sensitive formula ad juan    Blood Transfusions:  Transfuse if Hemoglobin < or equal 8 g/dL mg/dL  Red Blood Cell Order: (10 mL/kg) 75 mL, irradiated and leukoreduced   Transfuse if Platelet count < or equal 10K uL  Platelet order: 1 ped dose, irradiated and leukoreduced  Transfusion Pre-meds: None    Outpatient Pharmacy:  IV medications to be given in clinic: IVIG (0.5g/kg) every 14 days (next dose 6/26)    Laboratory Tests:  At next clinic appointment (date: 6/21/18)  Hemogram (CBC) differential, platelet count  Basic Metabolic Panel  Phosphorus  Cyclosporine level    Support Services:  Occupational Therapy Consult (Evaluate and Treat)-separate order must be sent to department   Physical Therapy Consult  (Evaluate and Treat)-separate order must be sent to department    Appointments:   BMT Clinic (date, time, provider): 6/21/18 at 07:30 AM with labs and appointment with MARCELLO Kapoor PA-C      Pending Results     Date and Time Order Name Status Description    6/18/2018 2200 Yeast culture Preliminary     6/18/2018 2200 Yeast culture Preliminary     5/22/2018 1030 Chimerism CD56 NK Cell Subset In process     5/22/2018 1030 Chimerism CD19 B Cell Subset In process     4/5/2018 0840 Platelets prepare order mLs conditional  In process     2/25/2018 1800 [employer] Rep[facility] In process             Statement of Approval     Ordered          06/20/18 1304  I have reviewed and agree with all the recommendations and orders detailed in this document.  EFFECTIVE NOW     Approved and electronically signed by:  Beryl Velazquez MD             Admission Information     Date & Time Provider Department  "Dept. Phone    2/15/2018 Candelaria Cantu MD [facility] Pediatric BMT [facility] [phone]      Your Vitals Were     Blood Pressure Pulse Temperature Respirations Height Weight    97/48 109 98  F (36.7  C) (Axillary) 28 0.638 m (2' 1.12\") 7.55 kg (16 lb 10.3 oz)    Head Circumference Pulse Oximetry BMI (Body Mass Index)             42 cm (16.54\") 99% 18.55 kg/m2         [facility] Information     [facility] lets you send messages to your doctor, view your test results, renew your prescriptions, schedule appointments and more. To sign up, go to [email or web address], contact your [facility] clinic or call [phone] during business hours.            Care EveryWhere ID     This is your Care EveryWhere ID. This could be used by other organizations to access your [facility] medical records  [id number]        Equal Access to Services     BABAK PUGH AH: Brittani Liao, wajoan manriquez, qaarmen kaaltisha valdivia, pam trujillo. So [facility] [phone].    ATENCIÓN: Si habla español, tiene a moreira disposición servicios gratuitos de asistencia lingüística. Llame al [phone].    We comply with applicable federal civil rights laws and [place] laws. We do not discriminate on the basis of race, color, national origin, age, disability, sex, sexual orientation, or gender identity.               Review of your medicines      START taking        Dose / Directions    acetaminophen 32 mg/mL solution   Commonly known as:  TYLENOL   Used for:  SCID (severe combined immunodeficiency disease) (H)        Dose:  15 mg/kg   Take 3 mLs (96 mg) by mouth every 4 hours as needed for mild pain   Quantity:  100 mL   Refills:  3       amLODIPine 1 mg/mL Susp   Commonly known as:  NORVASC   Used for:  SCID (severe combined immunodeficiency disease) (H)        Dose:  0.5 mg   Take 0.5 mLs (0.5 mg) by mouth daily   Quantity:  15 mL   Refills:  3       cholecalciferol 400 " UNIT/ML Liqd liquid   Commonly known as:  vitamin D/D-VI-SOL   Used for:  SCID (severe combined immunodeficiency disease) (H)        Dose:  200 Units   Take 0.5 mLs (200 Units) by mouth daily   Quantity:  15 mL   Refills:  3       cycloSPORINE modified 100 MG/ML solution   Commonly known as:  GENERIC EQUIVALENT   Used for:  SCID (severe combined immunodeficiency disease) (H)        Dose:  22 mg   0.22 mLs (22 mg) by Oral or G tube route 2 times daily   Quantity:  50 mL   Refills:  0       valACYclovir 50 mg/mL Susp   Commonly known as:  VALTREX   Indication:  Medication Treatment to Prevent Cytomegalovirus Disease   Used for:  SCID (severe combined immunodeficiency disease) (H)        Dose:  20 mg/kg   Take 3 mLs (150 mg) by mouth 3 times daily   Quantity:  270 mL   Refills:  3       voriconazole 40 MG/ML suspension   Commonly known as:  VFEND   Indication:  Fungal Infection Prophylaxis   Used for:  SCID (severe combined immunodeficiency disease) (H)        Dose:  92 mg   2.3 mLs (92 mg) by Oral or G tube route every 8 hours   Quantity:  180 mL   Refills:  3            Where to get your medicines      These medications were sent to [facility] [address]  [address] [address]     Phone:  [phone]     acetaminophen 32 mg/mL solution    amLODIPine 1 mg/mL Susp    cholecalciferol 400 UNIT/ML Liqd liquid    cycloSPORINE modified 100 MG/ML solution    valACYclovir 50 mg/mL Susp                Protect others around you: Learn how to safely use, store and throw away your medicines at www.disposemymeds.org.        ANTIBIOTIC INSTRUCTION     You've Been Prescribed an Antibiotic - Now What?  Your healthcare team thinks that you or your loved one might have an infection. Some infections can be treated with antibiotics, which are powerful, life-saving drugs. Like all medications, antibiotics have side effects and should only be used when necessary. There are  some important things you should know about your antibiotic treatment.      Your healthcare team may run tests before you start taking an antibiotic.    Your team may take samples (e.g., from your blood, urine or other areas) to run tests to look for bacteria. These test can be important to determine if you need an antibiotic at all and, if you do, which antibiotic will work best.      Within a few days, your healthcare team might change or even stop your antibiotic.    Your team may start you on an antibiotic while they are working to find out what is making you sick.    Your team might change your antibiotic because test results show that a different antibiotic would be better to treat your infection.    In some cases, once your team has more information, they learn that you do not need an antibiotic at all. They may find out that you don't have an infection, or that the antibiotic you're taking won't work against your infection. For example, an infection caused by a virus can't be treated with antibiotics. Staying on an antibiotic when you don't need it is more likely to be harmful than helpful.      You may experience side effects from your antibiotic.    Like all medications, antibiotics have side effects. Some of these can be serious.    Let you healthcare team know if you have any known allergies when you are admitted to the hospital.    One significant side effect of nearly all antibiotics is the risk of severe and sometimes deadly diarrhea caused by Clostridium difficile (C. Difficile). This occurs when a person takes antibiotics because some good germs are destroyed. Antibiotic use allows C. diificile to take over, putting patients at high risk for this serious infection.    As a patient or caregiver, it is important to understand your or your loved one's antibiotic treatment. It is especially important for caregivers to speak up when patients can't speak for themselves. Here are some important questions  to ask your healthcare team.    What infection is this antibiotic treating and how do you know I have that infection?    What side effects might occur from this antibiotic?    How long will I need to take this antibiotic?    Is it safe to take this antibiotic with other medications or supplements (e.g., vitamins) that I am taking?     Are there any special directions I need to know about taking this antibiotic? For example, should I take it with food?    How will I be monitored to know whether my infection is responding to the antibiotic?    What tests may help to make sure the right antibiotic is prescribed for me?      Information provided by:  www.cdc.gov/getsmart  U.S. Department of Health and Human Services  Centers for disease Control and Prevention  National Center for Emerging and Zoonotic Infectious Diseases  Division of Healthcare Quality Promotion             Medication List: This is a list of all your medications and when to take them. Check marks below indicate your daily home schedule. Keep this list as a reference.      Medications           Morning Afternoon Evening Bedtime As Needed    acetaminophen 32 mg/mL solution   Commonly known as:  TYLENOL   Take 3 mLs (96 mg) by mouth every 4 hours as needed for mild pain   Last time this was given:  96 mg on 6/12/2018 12:31 PM                                   amLODIPine 1 mg/mL Susp   Commonly known as:  NORVASC   Take 0.5 mLs (0.5 mg) by mouth daily   Last time this was given:  0.5 mg on 6/20/2018  8:58 AM                                   cholecalciferol 400 UNIT/ML Liqd liquid   Commonly known as:  vitamin D/D-VI-SOL   Take 0.5 mLs (200 Units) by mouth daily   Last time this was given:  200 Units on 6/20/2018  8:58 AM                                   cycloSPORINE modified 100 MG/ML solution   Commonly known as:  GENERIC EQUIVALENT   0.22 mLs (22 mg) by Oral or G tube route 2 times daily   Last time this was given:  22 mg on 6/20/2018  8:58 AM                                       valACYclovir 50 mg/mL Susp   Commonly known as:  VALTREX   Take 3 mLs (150 mg) by mouth 3 times daily   Last time this was given:  150 mg on 6/20/2018  1:24 PM                                         voriconazole 40 MG/ML suspension   Commonly known as:  VFEND   2.3 mLs (92 mg) by Oral or G tube route every 8 hours   Last time this was given:  92 mg on 6/20/2018  8:58 AM

## 2019-10-21 NOTE — PATIENT INSTRUCTIONS
Colposcopy: Before Your Procedure  What is a colposcopy? Colposcopy lets a doctor look at your vulva, vagina, and cervix. If the doctor sees a possible problem, he or she can take a small sample of tissue. Then another doctor studies the tissue under a microscope. This is called a biopsy. Most women have this procedure after they have abnormal results from a Pap test.  During the test, your doctor puts a lubricated tool into your vagina. This is called a speculum. It gently spreads apart the sides of your vagina. This allows your doctor to see inside your vagina and the cervix. The doctor also uses a magnifying device to help him or her see better. This device does not go inside your vagina. The doctor may put vinegar or iodine on your cervix. This can help the doctor to see any areas that are not normal. Sometimes the doctor also takes photos or videos. When the speculum goes in, it can feel a little uncomfortable. If the doctor does a biopsy, you may feel a pinch and have some cramping. Follow-up care is a key part of your treatment and safety. Be sure to make and go to all appointments, and call your doctor if you are having problems. It's also a good idea to know your test results and keep a list of the medicines you take. What happens before the procedure? Preparing for the procedure  · Tell your doctor if:  ? You are having your menstrual period. This test usually is not done during your period. This is because blood makes it harder to see your cervix. ? You are or might be pregnant. A blood or urine test may be done to see if you are pregnant. Colposcopy is safe during pregnancy. The chance of miscarriage is very small. But you may have some bleeding from a biopsy. ? You take blood thinners, such as warfarin (Coumadin), clopidogrel (Plavix), or aspirin.   · Do not douche, use tampons, have sexual intercourse, or use vaginal medicines for 24 hours before the test.  · Understand exactly what procedure is planned, along with the risks, benefits, and other options. · Tell your doctors ALL the medicines, vitamins, supplements, or herbal remedies you take. Some of these can increase the risk of bleeding. · Your doctor will tell you which medicines to take or stop before your procedure. You may need to stop taking certain medicines a week or more before the procedure. So talk to your doctor as soon as you can. · If you have an advance directive, let your doctor know. It may include a living will and a durable power of  for health care. Bring a copy to the hospital. If you don't have one, you may want to prepare one. It lets your doctor and loved ones know your health care wishes. Doctors advise that everyone prepare these papers before any type of surgery or procedure. Procedures can be stressful. This information will help you understand what you can expect. And it will help you safely prepare for your procedure. What happens on the day of the procedure?    · You may want to take a pain reliever 30 to 60 minutes before the test. This can help reduce any cramping pain from a biopsy. Ibuprofen (Advil or Motrin) is a good choice.     · Take a bath or shower before you come in for your procedure. Do not apply lotions, perfumes, deodorants, or nail polish.    At the doctor's office   · Bring a picture ID.     · The procedure will take about 15 to 30 minutes. Going home  · You will be given more specific instructions about recovering from your procedure. When should you call your doctor? · You have questions or concerns.     · You don't understand how to prepare for your procedure.     · You become ill before the procedure (such as fever, flu, or a cold).     · You need to reschedule or have changed your mind about having the procedure. Where can you learn more? Go to http://allie-francine.info/.   Enter W777 in the search box to learn more about \"Colposcopy: Before Your Procedure. \"  Current as of: December 19, 2018  Content Version: 12.2  © 6677-5998 [employer]. Care instructions adapted under license by [employer] (which disclaims liability or warranty for this information). If you have questions about a medical condition or this instruction, always ask your healthcare professional. [address] any warranty or liability for your use of this information. Colposcopy: What to Expect at [address] may feel some soreness in your vagina for a day or two if you had a biopsy. Some vaginal bleeding or discharge is normal for up to a week after a biopsy. The discharge may be dark-colored if a solution was put on your cervix. You can use a sanitary pad for the bleeding. It may take a week or two for you to get the test results. This care sheet gives you a general idea about how long it will take for you to recover. But each person recovers at a different pace. Follow the steps below to feel better as quickly as possible. How can you care for yourself at home? Activity    · You can return to work and most daily activities right after the test.   Exercise    · Do not exercise for 1 day after the test.   Medicines    · Your doctor will tell you if and when you can restart your medicines. He or she will also give you instructions about taking any new medicines.     · If you take blood thinners, such as warfarin (Coumadin), clopidogrel (Plavix), or aspirin, be sure to talk to your doctor. He or she will tell you if and when to start taking those medicines again. Make sure that you understand exactly what your doctor wants you to do.     · Take an over-the-counter pain medicine, such as acetaminophen (Tylenol), ibuprofen (Advil, Motrin), or naproxen (Aleve). Be safe with medicines. Read and follow all instructions on the label. Do not take two or more pain medicines at the same time unless the doctor told you to.  Many pain medicines have acetaminophen, which is Tylenol. Too much acetaminophen (Tylenol) can be harmful. Other instructions    · Use a pad if you have some bleeding.     · Do not douche, have sexual intercourse, or use tampons for 1 week if you had a biopsy. This will allow time for your cervix to heal.     · You can take a bath or shower anytime after the test.   Follow-up care is a key part of your treatment and safety. Be sure to make and go to all appointments, and call your doctor if you are having problems. It's also a good idea to know your test results and keep a list of the medicines you take. When should you call for help? Call your doctor now or seek immediate medical care if:    · You have severe vaginal bleeding. This means that you are soaking through your usual pads or tampons each hour for 2 or more hours.     · You have pain that does not get better after you take pain medicine.     · You have signs of infection, such as:  ? Increased pain. ? Bad-smelling vaginal discharge. ? A fever.    Watch closely for any changes in your health, and be sure to contact your doctor if:    · You have questions or concerns. Where can you learn more? Go to http://allie-francine.info/. Enter M523 in the search box to learn more about \"Colposcopy: What to Expect at Home. \"  Current as of: December 19, 2018  Content Version: 12.2  © 0313-6165 [employer]. Care instructions adapted under license by [employer] (which disclaims liability or warranty for this information). If you have questions about a medical condition or this instruction, always ask your healthcare professional. [place] [address] any warranty or liability for your use of this information. Human Papillomavirus (HPV): Care Instructions  Your Care Instructions  The human papillomavirus (HPV) is a very common virus. There are many types of HPV. Some types cause the common skin wart.  Other types cause genital warts, which can be spread by sexual contact. Some types can increase the risk for cervical and anal cancer. Having one type of HPV does not lead to having another type. Many women who have HPV may not know that they are infected until it is found with a Pap test. Your doctor uses this test to look for abnormal cells on your cervix. If you have had an abnormal Pap test, your doctor may recommend that you have an HPV test.  Like a Pap test, an HPV test is done on a sample of cells collected from the cervix. If the test finds that you have the types of HPV that might lead to cancer, your doctor may suggest more tests. This does not mean that you will develop cancer; it means that you may have an increased risk. Abnormal cell changes caused by HPV often go away on their own. If the changes do not go away, they can be treated. But because HPV can stay inside the body, the abnormal cervical cells sometimes come back. This is why it is important to follow up with your doctor and have regular Pap tests. Follow-up care is a key part of your treatment and safety. Be sure to make and go to all appointments, and call your doctor if you are having problems. It's also a good idea to know your test results and keep a list of the medicines you take. How can you care for yourself at home? · If you are going to have a Pap or HPV test, do not douche or use tampons or vaginal creams in the 24 hours before the test.  · Do not smoke. Smoking increases the risk for cervical problems and abnormal Pap tests. If you need help quitting, talk to your doctor about stop-smoking programs and medicines. These can increase your chances of quitting for good. · Use latex condoms every time you have sex. Use them from the beginning to the end of sexual contact. · Be sure to tell your sexual partner or partners that you have HPV. Even if you do not have symptoms, you can still pass HPV to others.   · Having one sex partner (who does not have STIs and does not have sex with anyone else) is a good way to avoid STIs. When should you call for help? Watch closely for changes in your health, and be sure to contact your doctor if:    · You have vaginal pain during or after sex.     · You have vaginal bleeding when you are not in your menstrual period. Where can you learn more? Go to http://allie-francine.info/. Enter F690 in the search box to learn more about \"Human Papillomavirus (HPV): Care Instructions. \"  Current as of: September 11, 2018  Content Version: 12.2  © 2054-7583 [employer]. Care instructions adapted under license by [employer] (which disclaims liability or warranty for this information). If you have questions about a medical condition or this instruction, always ask your healthcare professional. [address] any warranty or liability for your use of this information. Learning About Cervical Cancer Screening  What is a cervical cancer screening test?    The cervix is the lower part of the uterus that opens into the vagina. Cervical cancer screening tests check the cells on the cervix for changes that could lead to cancer. Two tests can be used to screen for cervical cancer. They may be used alone or together. A Pap test.   This test looks for changes in the cells of the cervix. Some of these cell changes could lead to cancer. A human papillomavirus (HPV) test.   This test looks for the HPV virus. Some high-risk types of HPV can cause cell changes that could lead to cervical cancer. During either test, the doctor or nurse will insert a tool called a speculum into your vagina. The speculum gently opens the vaginal walls. It allows your doctor to see inside the vagina and the cervix. He or she uses a small swab or brush to collect cell samples from your cervix. Try to schedule the test when you're not having your period.  To get ready for the test, your doctor may ask you to use a condom if you have sex before the test. Your doctor may also say to avoid douches, tampons, vaginal medicines, sprays, and powders for at least a day before you have the test.  When should you have a screening test?  Talk with your doctor about cervical cancer screening. If you are a woman with an average risk for cervical cancer, your doctor will likely suggest starting screening at age 24. Women 21 to 34  If you are in this age group, your doctor will likely suggest that you get a Pap test. If your Pap test results are normal, you can wait 3 years to have another test.  Women 27 to 59  Screening options for women in this age group may include:  · A Pap test. If your results are normal, you can wait 3 years to have another test.  · An HPV test. If your results are normal, you can wait 5 years to have another test.  · A Pap test and an HPV test. Having both tests is called co-testing. If your results are normal, you can wait 5 years to be tested again. Women 72 and older  · If you are age 72 or older, talk with your doctor about what's right for you. Women ages 72 and older may no longer need to be screened for cervical cancer. When to stop having screening tests depends on your medical history, your overall health, and your risk of cervical cell changes or cervical cancer. What happens after the test?  The sample of cells taken during your test will be sent to a lab so that an expert can look at the cells. It usually takes a week or two to get the results back. Pap tests  · A normal result means that the test didn't find any abnormal cells in the sample. · An abnormal result can mean many things. Most of these aren't cancer. The results of your test may be abnormal because:  ? You have an infection of the vagina or cervix, such as a yeast infection. ? You have low estrogen levels after menopause that are causing the cells to change. ?  You have cell changes that may be a sign of precancer or cancer. The results are ranked based on how serious the changes might be. HPV tests  · A negative result means that the test didn't find any high-risk HPV in the sample. · A positive HPV test means that at least one type of high-risk HPV was found. It doesn't mean that you have cervical cancer, but it increases your chance of having cell changes that could lead to cancer. Follow-up care is a key part of your treatment and safety. Be sure to make and go to all appointments, and call your doctor if you are having problems. It's also a good idea to know your test results and keep a list of the medicines you take. Where can you learn more? Go to http://allie-francine.info/. Enter P919 in the search box to learn more about \"Learning About Cervical Cancer Screening. \"  Current as of: December 19, 2018  Content Version: 12.2  © 2757-6513 [employer]. Care instructions adapted under license by [employer] (which disclaims liability or warranty for this information). If you have questions about a medical condition or this instruction, always ask your healthcare professional. [address] any warranty or liability for your use of this information.

## 2019-10-21 NOTE — PROGRESS NOTES
Subjective:      Lukasz Wilson is a 52 y.o. female seen for evaluation of abnormal Pap smear. She had a normal cytology result, but had a +HPV 18 result. Patient Active Problem List   Diagnosis Code    Alcohol abuse F10.10    Syncope R55    Cocaine abuse ([facility] F14.10    Elevated troponin R79.89    QT prolongation R94.31    NSTEMI (non-ST elevated myocardial infarction) ([facility] I21.4     Patient Active Problem List    Diagnosis Date Noted    Alcohol abuse 10/24/2017    Syncope 10/24/2017    Cocaine abuse ([facility] 10/24/2017    Elevated troponin 10/24/2017    QT prolongation 10/24/2017    NSTEMI (non-ST elevated myocardial infarction) ([facility] 10/24/2017     Current Outpatient Medications   Medication Sig Dispense Refill    amLODIPine (NORVASC) 5 mg tablet Take 0.5 Tabs by mouth daily. Indications: hypertension 30 Tab 0    cephALEXin (KEFLEX) 500 mg capsule Take 1 Cap by mouth four (4) times daily for 7 days. 28 Cap 0    ibuprofen (MOTRIN) 600 mg tablet Take 1 Tab by mouth every eight (8) hours as needed for Pain. 20 Tab 0    azithromycin (ZITHROMAX Z-SELVIN) 250 mg tablet Take two tablets the first day and then one every day thereafter until completion 6 Tab 0    aspirin delayed-release 81 mg tablet Take 1 Tab by mouth daily. 30 Tab 0    atorvastatin (LIPITOR) 20 mg tablet Take 1 Tab by mouth nightly. 30 Tab 0    folic acid (FOLVITE) 1 mg tablet Take 1 Tab by mouth daily. 30 Tab 0    therapeutic multivitamin (THERAGRAN) tablet Take 1 Tab by mouth daily. 30 Tab 0    thiamine (B-1) 100 mg tablet Take 1 Tab by mouth daily. 30 Tab 0    docusate sodium (COLACE) 100 mg capsule Take 1 capsule twice daily 60 Cap 0    promethazine (PHENERGAN) 25 mg tablet Take 1 Tab by mouth every six (6) hours as needed. Indications: Nausea/Vomiting 12 Tab 0     No Known Allergies  Past Medical History:   Diagnosis Date    Hypertension      History reviewed. No pertinent surgical history. History reviewed.  No pertinent family history. Social History     Tobacco Use    Smoking status: Never Smoker    Smokeless tobacco: Never Used   Substance Use Topics    Alcohol use: Yes     Comment: occational beer      Review of Systems    A comprehensive review of systems was negative except for that written in the HPI. Objective:     Visit Vitals  BP (!) 127/95   Pulse 71   Resp 20   Ht 5' 4\" (1.626 m)   Wt 122 lb 9.6 oz (55.6 kg)   BMI 21.04 kg/m²      Physical Exam:   General appearance - alert, well appearing, and in no distress     Assessment/Plan:   Colposcopy is indicated, will be scheduled as soon as possible    ICD-10-CM ICD-9-CM    1. Acute cystitis without hematuria N30.00 595.0 AMB POC URINALYSIS DIP STICK AUTO W/O MICRO     Encounter Diagnoses   Name Primary?  Acute cystitis without hematuria Yes     Orders Placed This Encounter    AMB POC URINALYSIS DIP STICK AUTO W/O MICRO     Follow-up and Dispositions    · Return in about 3 weeks (around 11/11/2019) for PROCEDURE: Colposcopy and possible biopsy.

## 2019-11-22 ENCOUNTER — OFFICE VISIT (OUTPATIENT)
Dept: OBGYN CLINIC | Age: 54
End: 2019-11-22

## 2019-11-22 VITALS
BODY MASS INDEX: 21.61 KG/M2 | OXYGEN SATURATION: 99 % | HEART RATE: 71 BPM | HEIGHT: 64 IN | WEIGHT: 126.6 LBS | SYSTOLIC BLOOD PRESSURE: 137 MMHG | RESPIRATION RATE: 20 BRPM | DIASTOLIC BLOOD PRESSURE: 93 MMHG

## 2019-11-22 DIAGNOSIS — B97.7 HIGH RISK HPV INFECTION: Primary | ICD-10-CM

## 2019-11-22 LAB
HCG URINE, QL. (POC): NEGATIVE
VALID INTERNAL CONTROL?: YES

## 2019-11-22 NOTE — PROGRESS NOTES
See procedure note. Colposcopy Procedure Note    Indications: The prior Pap result: +HR HPV    Procedure Details   The risks and benefits of the procedure and written informed consent obtained. Speculum placed in vagina and excellent visualization of cervix achieved, cervix swabbed x 3 with acetic acid solution. Findings:  Cervix: acetowhite lesion(s) noted at 12 o'clock; endocervical curettage performed. Specimens: ectocervical biopsy at 12 o'clock and endocervical curettage. Complications: Minimal tissue able to be obtained. Cervical stenosis noted. Numerous biopsies attempted at 12 o'clock position with minimal tissue yield. Patient uncomfortable, so procedure aborted. Will send specimen obtained. Plan:  Specimens labelled and sent to Pathology. Will base further treatment on Pathology findings. Carmencita Helm

## 2019-11-22 NOTE — PROGRESS NOTES
[facility] OB/GYN  OFFICE PROCEDURE PROGRESS NOTE        Chart reviewed for the following:   Micheline Garcia LPN, have reviewed the History, Physical and updated the Allergic reactions for Alina Limes performed immediately prior to start of procedure:   Alessandro MURRAY LPN, have performed the following reviews on Lola Ware prior to the start of the procedure:            * Patient was identified by name and date of birth   * Agreement on procedure being performed was verified  * Risks and Benefits explained to the patient  * Procedure site verified and marked as necessary  * Patient was positioned for comfort  * Consent was signed and verified     Time:     Date of procedure: 11/22/2019    Procedure performed by:  Ani Rivas MD    Provider assisted by:  LPN    Patient assisted by: self    How tolerated by patient: tolerated the procedure well with no complications    Post Procedural Pain Scale: 0 - No Hurt    Comments: none

## 2019-11-23 ENCOUNTER — HOSPITAL ENCOUNTER (EMERGENCY)
Age: 54
Discharge: HOME | End: 2019-11-23
Attending: EMERGENCY MEDICINE
Payer: MEDICAID

## 2019-11-23 VITALS
SYSTOLIC BLOOD PRESSURE: 146 MMHG | HEART RATE: 73 BPM | TEMPERATURE: 97.6 F | OXYGEN SATURATION: 100 % | DIASTOLIC BLOOD PRESSURE: 101 MMHG | RESPIRATION RATE: 18 BRPM

## 2019-11-23 DIAGNOSIS — N30.00 ACUTE CYSTITIS WITHOUT HEMATURIA: ICD-10-CM

## 2019-11-23 DIAGNOSIS — N76.0 BV (BACTERIAL VAGINOSIS): Primary | ICD-10-CM

## 2019-11-23 DIAGNOSIS — B96.89 BV (BACTERIAL VAGINOSIS): Primary | ICD-10-CM

## 2019-11-23 LAB
APPEARANCE UR: CLEAR
BACTERIA URNS QL MICRO: ABNORMAL /HPF
BILIRUB UR QL: NEGATIVE
COLOR UR: YELLOW
EPITH CASTS URNS QL MICRO: ABNORMAL /LPF (ref 0–5)
GLUCOSE UR STRIP.AUTO-MCNC: NEGATIVE MG/DL
HGB UR QL STRIP: NEGATIVE
KETONES UR QL STRIP.AUTO: NEGATIVE MG/DL
LEUKOCYTE ESTERASE UR QL STRIP.AUTO: ABNORMAL
NITRITE UR QL STRIP.AUTO: NEGATIVE
PH UR STRIP: 7 [PH] (ref 5–8)
PROT UR STRIP-MCNC: NEGATIVE MG/DL
RBC #/AREA URNS HPF: NEGATIVE /HPF (ref 0–5)
SERVICE CMNT-IMP: NORMAL
SP GR UR REFRACTOMETRY: 1.02 (ref 1–1.03)
UROBILINOGEN UR QL STRIP.AUTO: 1 EU/DL (ref 0.2–1)
WBC URNS QL MICRO: ABNORMAL /HPF (ref 0–4)
WET PREP GENITAL: NORMAL

## 2019-11-23 PROCEDURE — 87210 SMEAR WET MOUNT SALINE/INK: CPT

## 2019-11-23 PROCEDURE — 99283 EMERGENCY DEPT VISIT LOW MDM: CPT

## 2019-11-23 PROCEDURE — 74011000250 HC RX REV CODE- 250: Performed by: PHYSICIAN ASSISTANT

## 2019-11-23 PROCEDURE — 74011250637 HC RX REV CODE- 250/637: Performed by: PHYSICIAN ASSISTANT

## 2019-11-23 PROCEDURE — 81001 URINALYSIS AUTO W/SCOPE: CPT

## 2019-11-23 PROCEDURE — 96372 THER/PROPH/DIAG INJ SC/IM: CPT

## 2019-11-23 PROCEDURE — 74011250636 HC RX REV CODE- 250/636: Performed by: PHYSICIAN ASSISTANT

## 2019-11-23 PROCEDURE — 87491 CHLMYD TRACH DNA AMP PROBE: CPT

## 2019-11-23 RX ORDER — METRONIDAZOLE 500 MG/1
500 TABLET ORAL 2 TIMES DAILY
Qty: 14 TAB | Refills: 0 | Status: SHIPPED | OUTPATIENT
Start: 2019-11-23 | End: 2019-11-30

## 2019-11-23 RX ORDER — AZITHROMYCIN 250 MG/1
1000 TABLET, FILM COATED ORAL
Status: COMPLETED | OUTPATIENT
Start: 2019-11-23 | End: 2019-11-23

## 2019-11-23 RX ORDER — CEPHALEXIN 500 MG/1
500 CAPSULE ORAL 2 TIMES DAILY
Qty: 14 CAP | Refills: 0 | Status: SHIPPED | OUTPATIENT
Start: 2019-11-23 | End: 2019-11-30

## 2019-11-23 RX ADMIN — AZITHROMYCIN MONOHYDRATE 1000 MG: 250 TABLET ORAL at 14:25

## 2019-11-23 RX ADMIN — LIDOCAINE HYDROCHLORIDE 250 MG: 10 INJECTION, SOLUTION EPIDURAL; INFILTRATION; INTRACAUDAL; PERINEURAL at 14:25

## 2019-11-23 NOTE — DISCHARGE INSTRUCTIONS
Patient Education        Bacterial Vaginosis: Care Instructions  Your Care Instructions    Bacterial vaginosis is a type of vaginal infection. It is caused by excess growth of certain bacteria that are normally found in the vagina. Symptoms can include itching, swelling, pain when you urinate or have sex, and a gray or yellow discharge with a \"fishy\" odor. It is not considered an infection that is spread through sexual contact. Although symptoms can be annoying and uncomfortable, bacterial vaginosis does not usually cause other health problems. However, if you have it while you are pregnant, it can cause complications. While the infection may go away on its own, most doctors use antibiotics to treat it. You may have been prescribed pills or vaginal cream. With treatment, bacterial vaginosis usually clears up in 5 to 7 days. Follow-up care is a key part of your treatment and safety. Be sure to make and go to all appointments, and call your doctor if you are having problems. It's also a good idea to know your test results and keep a list of the medicines you take. How can you care for yourself at home? · Take your antibiotics as directed. Do not stop taking them just because you feel better. You need to take the full course of antibiotics. · Do not eat or drink anything that contains alcohol if you are taking metronidazole (Flagyl). · Keep using your medicine if you start your period. Use pads instead of tampons while using a vaginal cream or suppository. Tampons can absorb the medicine. · Wear loose cotton clothing. Do not wear nylon and other materials that hold body heat and moisture close to the skin. · Do not scratch. Relieve itching with a cold pack or a cool bath. · Do not wash your vaginal area more than once a day. Use plain water or a mild, unscented soap. Do not douche. When should you call for help?   Watch closely for changes in your health, and be sure to contact your doctor if:    · You have unexpected vaginal bleeding.     · You have a fever.     · You have new or increased pain in your vagina or pelvis.     · You are not getting better after 1 week.     · Your symptoms return after you finish the course of your medicine. Where can you learn more? Go to http://allie-francine.info/. Arely Lobomalinda in the search box to learn more about \"Bacterial Vaginosis: Care Instructions. \"  Current as of: February 19, 2019  Content Version: 12.2  © 8824-4056 [employer]. Care instructions adapted under license by [employer] (which disclaims liability or warranty for this information). If you have questions about a medical condition or this instruction, always ask your healthcare professional. [place] [address] any warranty or liability for your use of this information. Patient Education        Urinary Tract Infection in Women: Care Instructions  Your Care Instructions    A urinary tract infection, or UTI, is a general term for an infection anywhere between the kidneys and the urethra (where urine comes out). Most UTIs are bladder infections. They often cause pain or burning when you urinate. UTIs are caused by bacteria and can be cured with antibiotics. Be sure to complete your treatment so that the infection goes away. Follow-up care is a key part of your treatment and safety. Be sure to make and go to all appointments, and call your doctor if you are having problems. It's also a good idea to know your test results and keep a list of the medicines you take. How can you care for yourself at home? · Take your antibiotics as directed. Do not stop taking them just because you feel better. You need to take the full course of antibiotics. · Drink extra water and other fluids for the next day or two. This may help wash out the bacteria that are causing the infection.  (If you have kidney, heart, or liver disease and have to limit fluids, talk with your doctor before you increase your fluid intake.)  · Avoid drinks that are carbonated or have caffeine. They can irritate the bladder. · Urinate often. Try to empty your bladder each time. · To relieve pain, take a hot bath or lay a heating pad set on low over your lower belly or genital area. Never go to sleep with a heating pad in place. To prevent UTIs  · Drink plenty of water each day. This helps you urinate often, which clears bacteria from your system. (If you have kidney, heart, or liver disease and have to limit fluids, talk with your doctor before you increase your fluid intake.)  · Urinate when you need to. · Urinate right after you have sex. · Change sanitary pads often. · Avoid douches, bubble baths, feminine hygiene sprays, and other feminine hygiene products that have deodorants. · After going to the bathroom, wipe from front to back. When should you call for help? Call your doctor now or seek immediate medical care if:    · Symptoms such as fever, chills, nausea, or vomiting get worse or appear for the first time.     · You have new pain in your back just below your rib cage. This is called flank pain.     · There is new blood or pus in your urine.     · You have any problems with your antibiotic medicine.    Watch closely for changes in your health, and be sure to contact your doctor if:    · You are not getting better after taking an antibiotic for 2 days.     · Your symptoms go away but then come back. Where can you learn more? Go to http://allie-francine.info/. Enter G689 in the search box to learn more about \"Urinary Tract Infection in Women: Care Instructions. \"  Current as of: December 19, 2018  Content Version: 12.2  © 1245-3590 [employer]. Care instructions adapted under license by [employer] (which disclaims liability or warranty for this information).  If you have questions about a medical condition or this instruction, always ask your healthcare professional. [address] any warranty or liability for your use of this information.

## 2019-11-23 NOTE — ED PROVIDER NOTES
EMERGENCY DEPARTMENT HISTORY AND PHYSICAL EXAM      Date: 11/23/2019  Patient Name: Zay Orozco    History of Presenting Illness     Chief Complaint   Patient presents with    Back Pain    Vaginal Discharge       History Provided By: Patient    HPI: Zay Orozco, 52 y.o. female PMHx significant for htn presents ambulatory to the ED with cc of intermittent \"vaginal pains\" with assoc increase in vaginal discharge x 1 week. Denies abdominal pain, nausea/vomiting, diarrhea. Patient has not taken anything for symptoms. Patient requesting pelvic exam.        Patient also reports intermittent aching low back pain. Denies dysuria, hematuria. Denies known trauma or injury. Denies saddle anesthesia, loss of bowel or bladder function. There are no other complaints, changes, or physical findings at this time. PCP: Kristen Begum MD    No current facility-administered medications on file prior to encounter. Current Outpatient Medications on File Prior to Encounter   Medication Sig Dispense Refill    ibuprofen (MOTRIN) 600 mg tablet Take 1 Tab by mouth every eight (8) hours as needed for Pain. 20 Tab 0    azithromycin (ZITHROMAX Z-SELVIN) 250 mg tablet Take two tablets the first day and then one every day thereafter until completion 6 Tab 0    aspirin delayed-release 81 mg tablet Take 1 Tab by mouth daily. 30 Tab 0    atorvastatin (LIPITOR) 20 mg tablet Take 1 Tab by mouth nightly. 30 Tab 0    folic acid (FOLVITE) 1 mg tablet Take 1 Tab by mouth daily. 30 Tab 0    therapeutic multivitamin (THERAGRAN) tablet Take 1 Tab by mouth daily. 30 Tab 0    thiamine (B-1) 100 mg tablet Take 1 Tab by mouth daily. 30 Tab 0    amLODIPine (NORVASC) 5 mg tablet Take 0.5 Tabs by mouth daily. Indications: hypertension 30 Tab 0    docusate sodium (COLACE) 100 mg capsule Take 1 capsule twice daily 60 Cap 0    promethazine (PHENERGAN) 25 mg tablet Take 1 Tab by mouth every six (6) hours as needed.  Indications: Nausea/Vomiting 12 Tab 0       Past History     Past Medical History:  Past Medical History:   Diagnosis Date    Hypertension        Past Surgical History:  History reviewed. No pertinent surgical history. Family History:  History reviewed. No pertinent family history. Social History:  Social History     Tobacco Use    Smoking status: Never Smoker    Smokeless tobacco: Never Used   Substance Use Topics    Alcohol use: Yes     Comment: occational beer    Drug use: No       Allergies:  No Known Allergies      Review of Systems   Review of Systems   Constitutional: Negative for chills and fever. Respiratory: Negative for shortness of breath. Cardiovascular: Negative for chest pain. Gastrointestinal: Negative for abdominal pain, nausea and vomiting. Genitourinary: Positive for vaginal discharge. Negative for dysuria, flank pain, frequency and vaginal bleeding. Musculoskeletal: Positive for back pain. Negative for myalgias. Skin: Negative for color change, pallor, rash and wound. Neurological: Negative for dizziness, weakness and light-headedness. All other systems reviewed and are negative. Physical Exam   Physical Exam  Vitals signs and nursing note reviewed. Constitutional:       General: She is not in acute distress. Appearance: She is well-developed. HENT:      Head: Normocephalic and atraumatic. Eyes:      Conjunctiva/sclera: Conjunctivae normal.   Cardiovascular:      Rate and Rhythm: Normal rate and regular rhythm. Heart sounds: Normal heart sounds. Pulmonary:      Effort: Pulmonary effort is normal. No respiratory distress. Breath sounds: Normal breath sounds. Abdominal:      General: Bowel sounds are normal. There is no distension. Palpations: Abdomen is soft. Comments: Abdomen soft nontender  No CVT   Genitourinary:     Vagina: No signs of injury and foreign body. Vaginal discharge present.  No erythema, tenderness, bleeding, lesions or prolapsed vaginal walls. Cervix: Discharge and friability present. No cervical motion tenderness, lesion, erythema, cervical bleeding or eversion. Uterus: Not tender. Adnexa:         Right: No tenderness. Left: No tenderness. Comments: No CMT  Cervical os closed  Musculoskeletal: Normal range of motion. Lumbar back: She exhibits tenderness. She exhibits no bony tenderness. Back:    Skin:     General: Skin is warm. Findings: No rash. Neurological:      Mental Status: She is alert and oriented to person, place, and time. Psychiatric:         Behavior: Behavior normal.         Diagnostic Study Results     Labs -     Recent Results (from the past 12 hour(s))   URINALYSIS W/ RFLX MICROSCOPIC    Collection Time: 11/23/19 11:37 AM   Result Value Ref Range    Color YELLOW      Appearance CLEAR      Specific gravity 1.018 1.005 - 1.030      pH (UA) 7.0 5.0 - 8.0      Protein NEGATIVE  NEG mg/dL    Glucose NEGATIVE  NEG mg/dL    Ketone NEGATIVE  NEG mg/dL    Bilirubin NEGATIVE  NEG      Blood NEGATIVE  NEG      Urobilinogen 1.0 0.2 - 1.0 EU/dL    Nitrites NEGATIVE  NEG      Leukocyte Esterase TRACE (A) NEG     URINE MICROSCOPIC ONLY    Collection Time: 11/23/19 11:37 AM   Result Value Ref Range    WBC 3 to 5 0 - 4 /hpf    RBC NEGATIVE  0 - 5 /hpf    Epithelial cells FEW 0 - 5 /lpf    Bacteria 4+ (A) NEG /hpf   WET PREP    Collection Time: 11/23/19 12:59 PM   Result Value Ref Range    Special Requests: NO SPECIAL REQUESTS      Wet prep MODERATE  CLUE CELLS PRESENT        Wet prep NO TRICHOMONAS SEEN      Wet prep NO YEAST SEEN         Radiologic Studies -   No orders to display     CT Results  (Last 48 hours)    None        CXR Results  (Last 48 hours)    None          Medical Decision Making   I am the first provider for this patient.     I reviewed the vital signs, available nursing notes, past medical history, past surgical history, family history and social history. Vital Signs-Reviewed the patient's vital signs. Patient Vitals for the past 12 hrs:   Temp Pulse Resp BP SpO2   11/23/19 1135 97.6 °F (36.4 °C) 73 18 (!) 146/101 100 %       Records Reviewed: Nursing Notes and Old Medical Records    Provider Notes (Medical Decision Making):   DDx: Gonorrhea, Chlamydia, Trich, UTI, BV, Yeast, PID, Lumbar strain, Muscle spasm      ED Course:   Initial assessment performed. The patients presenting problems have been discussed, and they are in agreement with the care plan formulated and outlined with them. I have encouraged them to ask questions as they arise throughout their visit. Disposition:  Discussed lab results with pt along with dx and treatment plan. Discussed importance of PCP follow up. All questions answered. Pt voiced they understood. Return if sx worsen. PLAN:  1. Discharge Medication List as of 11/23/2019  1:26 PM      START taking these medications    Details   cephALEXin (KEFLEX) 500 mg capsule Take 1 Cap by mouth two (2) times a day for 7 days. , Normal, Disp-14 Cap, R-0      metroNIDAZOLE (FLAGYL) 500 mg tablet Take 1 Tab by mouth two (2) times a day for 7 days. , Normal, Disp-14 Tab, R-0         CONTINUE these medications which have NOT CHANGED    Details   ibuprofen (MOTRIN) 600 mg tablet Take 1 Tab by mouth every eight (8) hours as needed for Pain., Print, Disp-20 Tab, R-0      azithromycin (ZITHROMAX Z-SELVIN) 250 mg tablet Take two tablets the first day and then one every day thereafter until completion, Print, Disp-6 Tab, R-0      aspirin delayed-release 81 mg tablet Take 1 Tab by mouth daily. , Print, Disp-30 Tab, R-0      atorvastatin (LIPITOR) 20 mg tablet Take 1 Tab by mouth nightly. , Print, Disp-30 Tab, R-0      folic acid (FOLVITE) 1 mg tablet Take 1 Tab by mouth daily. , Print, Disp-30 Tab, R-0      therapeutic multivitamin (THERAGRAN) tablet Take 1 Tab by mouth daily. , Print, Disp-30 Tab, R-0      thiamine (B-1) 100 mg tablet Take 1 Tab by mouth daily. , Print, Disp-30 Tab, R-0      amLODIPine (NORVASC) 5 mg tablet Take 0.5 Tabs by mouth daily. Indications: hypertension, Print, Disp-30 Tab, R-0      docusate sodium (COLACE) 100 mg capsule Take 1 capsule twice daily, Print, Disp-60 Cap, R-0      promethazine (PHENERGAN) 25 mg tablet Take 1 Tab by mouth every six (6) hours as needed. Indications: Nausea/Vomiting, Print, Disp-12 Tab, R-0           2. Follow-up Information     Follow up With Specialties Details Why Contact Info    Ameya Yousif MD Internal Medicine Schedule an appointment as soon as possible for a visit in 1 day As needed Mannie Maxwell [phone]          Return to ED if worse     Diagnosis     Clinical Impression:   1. BV (bacterial vaginosis)    2. Acute cystitis without hematuria        Attestations:    ARCHIE Eldridge    Please note that this dictation was completed with [employer], the computer voice recognition software. Quite often unanticipated grammatical, syntax, homophones, and other interpretive errors are inadvertently transcribed by the computer software. Please disregard these errors. Please excuse any errors that have escaped final proofreading. Thank you.

## 2019-11-27 LAB
DX ICD CODE: NORMAL
DX ICD CODE: NORMAL
PATH REPORT.FINAL DX SPEC: NORMAL
PATH REPORT.GROSS SPEC: NORMAL
PATH REPORT.RELEVANT HX SPEC: NORMAL
PATH REPORT.SITE OF ORIGIN SPEC: NORMAL
PATHOLOGIST NAME: NORMAL
PAYMENT PROCEDURE: NORMAL

## 2020-01-17 ENCOUNTER — HOSPITAL ENCOUNTER (EMERGENCY)
Age: 55
Discharge: HOME | End: 2020-01-17
Attending: EMERGENCY MEDICINE
Payer: MEDICAID

## 2020-01-17 VITALS
HEART RATE: 67 BPM | DIASTOLIC BLOOD PRESSURE: 119 MMHG | TEMPERATURE: 97.1 F | RESPIRATION RATE: 18 BRPM | OXYGEN SATURATION: 100 % | SYSTOLIC BLOOD PRESSURE: 140 MMHG

## 2020-01-17 DIAGNOSIS — N89.8 VAGINAL DISCHARGE: Primary | ICD-10-CM

## 2020-01-17 DIAGNOSIS — B96.89 BV (BACTERIAL VAGINOSIS): ICD-10-CM

## 2020-01-17 DIAGNOSIS — A59.9 TRICHIMONIASIS: ICD-10-CM

## 2020-01-17 DIAGNOSIS — N76.0 BV (BACTERIAL VAGINOSIS): ICD-10-CM

## 2020-01-17 LAB
APPEARANCE UR: CLEAR
BACTERIA URNS QL MICRO: ABNORMAL /HPF
BILIRUB UR QL: NEGATIVE
COLOR UR: YELLOW
EPITH CASTS URNS QL MICRO: ABNORMAL /LPF (ref 0–5)
GLUCOSE UR STRIP.AUTO-MCNC: NEGATIVE MG/DL
HGB UR QL STRIP: NEGATIVE
KETONES UR QL STRIP.AUTO: NEGATIVE MG/DL
LEUKOCYTE ESTERASE UR QL STRIP.AUTO: ABNORMAL
NITRITE UR QL STRIP.AUTO: NEGATIVE
PH UR STRIP: 6 [PH] (ref 5–8)
PROT UR STRIP-MCNC: NEGATIVE MG/DL
SERVICE CMNT-IMP: NORMAL
SP GR UR REFRACTOMETRY: 1.01 (ref 1–1.03)
UROBILINOGEN UR QL STRIP.AUTO: 0.2 EU/DL (ref 0.2–1)
WBC URNS QL MICRO: ABNORMAL /HPF (ref 0–4)
WET PREP GENITAL: NORMAL

## 2020-01-17 PROCEDURE — 96372 THER/PROPH/DIAG INJ SC/IM: CPT

## 2020-01-17 PROCEDURE — 74011250637 HC RX REV CODE- 250/637: Performed by: PHYSICIAN ASSISTANT

## 2020-01-17 PROCEDURE — 74011250636 HC RX REV CODE- 250/636: Performed by: PHYSICIAN ASSISTANT

## 2020-01-17 PROCEDURE — 87210 SMEAR WET MOUNT SALINE/INK: CPT

## 2020-01-17 PROCEDURE — 99283 EMERGENCY DEPT VISIT LOW MDM: CPT

## 2020-01-17 PROCEDURE — 87491 CHLMYD TRACH DNA AMP PROBE: CPT

## 2020-01-17 PROCEDURE — 81001 URINALYSIS AUTO W/SCOPE: CPT

## 2020-01-17 RX ORDER — METRONIDAZOLE 500 MG/1
500 TABLET ORAL 2 TIMES DAILY
Qty: 14 TAB | Refills: 0 | Status: SHIPPED | OUTPATIENT
Start: 2020-01-17 | End: 2020-01-17

## 2020-01-17 RX ORDER — AZITHROMYCIN 250 MG/1
1000 TABLET, FILM COATED ORAL
Status: COMPLETED | OUTPATIENT
Start: 2020-01-17 | End: 2020-01-17

## 2020-01-17 RX ORDER — CEFTRIAXONE 250 MG/8ML
250 INJECTION, POWDER, FOR SOLUTION INTRAMUSCULAR; INTRAVENOUS
Status: COMPLETED | OUTPATIENT
Start: 2020-01-17 | End: 2020-01-17

## 2020-01-17 RX ORDER — METRONIDAZOLE 500 MG/1
2000 TABLET ORAL
Status: DISCONTINUED | OUTPATIENT
Start: 2020-01-17 | End: 2020-01-17

## 2020-01-17 RX ORDER — METRONIDAZOLE 500 MG/1
500 TABLET ORAL 2 TIMES DAILY
Qty: 14 TAB | Refills: 0 | Status: SHIPPED | OUTPATIENT
Start: 2020-01-17 | End: 2020-01-24

## 2020-01-17 RX ADMIN — CEFTRIAXONE SODIUM 250 MG: 250 INJECTION, POWDER, FOR SOLUTION INTRAMUSCULAR; INTRAVENOUS at 13:02

## 2020-01-17 RX ADMIN — AZITHROMYCIN 1000 MG: 250 TABLET, FILM COATED ORAL at 13:02

## 2020-01-17 NOTE — DISCHARGE INSTRUCTIONS
Patient Education        Trichomoniasis: Care Instructions  Your Care Instructions  Trichomoniasis is a sexually transmitted infection (STI) that is spread by having sex with an infected partner. Trichomoniasis is commonly called trich (say \"trick\"). In women, trich may cause vaginal itching and a smelly discharge. But in many cases, especially in men, there are no symptoms. Twyla Peres is treated so that you do not spread it to others. Both you and your sex partner or partners should be treated at the same time so you do not infect each other again. Trich may cause problems with pregnancy. Your doctor will talk with you about treatment for Trich if you are pregnant. Follow-up care is a key part of your treatment and safety. Be sure to make and go to all appointments, and call your doctor if you are having problems. It's also a good idea to know your test results and keep a list of the medicines you take. How can you care for yourself at home? · Take your antibiotics as directed. Do not stop taking them just because you feel better. You need to take the full course of antibiotics. · Do not have sex while you are being treated. If your doctor gave you a single dose of antibiotics, do not have sex for one week after being treated and until your partner also has been treated. · Tell your sex partner (or partners) that he or she will also need to be tested and treated. · Use a cold water compress or cool baths to relieve itching. To prevent trichomoniasis in the future  · Use latex condoms every time you have sex. Use them from the beginning to the end of sexual contact. · Talk to your partner before having sex. Find out if he or she has or is at risk for trich or any other STI. Keep in mind that a person may be able to spread an STI even if he or she does not have symptoms. · Do not have sex if you are being treated for trich or any other STI.   · Do not have sex with anyone who has symptoms of an STI, such as sores on the genitals or mouth. · Having one sex partner (who does not have STIs and does not have sex with anyone else) is a good way to avoid STIs. When should you call for help? Call your doctor now or seek immediate medical care if:    · You have unusual vaginal bleeding.     · You have a fever.     · You have new discharge from the vagina or penis.     · You have pelvic pain.    Watch closely for changes in your health, and be sure to contact your doctor if:    · You do not get better as expected.     · You have any new symptoms or your symptoms get worse. Where can you learn more? Go to http://allie-francine.info/. Enter T549 in the search box to learn more about \"Trichomoniasis: Care Instructions. \"  Current as of: September 11, 2018  Content Version: 12.2  © 5327-8583 [employer]. Care instructions adapted under license by [employer] (which disclaims liability or warranty for this information). If you have questions about a medical condition or this instruction, always ask your healthcare professional. [address] any warranty or liability for your use of this information.

## 2020-01-18 NOTE — ED PROVIDER NOTES
EMERGENCY DEPARTMENT HISTORY AND PHYSICAL EXAM    Date: 1/17/2020  Patient Name: Jared Anthony    History of Presenting Illness     Chief Complaint   Patient presents with    Vaginal Pain    Urinary Frequency         History Provided By: patient        Additional History (Context): Jared Anthony is a 60yo F here with c/o vaginal discharge. No associated sx of pelvic pain, fever, chills, flank pain, urinary sx, vaginal bleeding. Has unprotected intercourse. No other complaints today    PCP: Juju Steen MD    Current Outpatient Medications   Medication Sig Dispense Refill    metroNIDAZOLE (FLAGYL) 500 mg tablet Take 1 Tab by mouth two (2) times a day for 7 days. 14 Tab 0    ibuprofen (MOTRIN) 600 mg tablet Take 1 Tab by mouth every eight (8) hours as needed for Pain. 20 Tab 0    azithromycin (ZITHROMAX Z-SELVIN) 250 mg tablet Take two tablets the first day and then one every day thereafter until completion 6 Tab 0    aspirin delayed-release 81 mg tablet Take 1 Tab by mouth daily. 30 Tab 0    atorvastatin (LIPITOR) 20 mg tablet Take 1 Tab by mouth nightly. 30 Tab 0    folic acid (FOLVITE) 1 mg tablet Take 1 Tab by mouth daily. 30 Tab 0    therapeutic multivitamin (THERAGRAN) tablet Take 1 Tab by mouth daily. 30 Tab 0    thiamine (B-1) 100 mg tablet Take 1 Tab by mouth daily. 30 Tab 0    amLODIPine (NORVASC) 5 mg tablet Take 0.5 Tabs by mouth daily. Indications: hypertension 30 Tab 0    docusate sodium (COLACE) 100 mg capsule Take 1 capsule twice daily 60 Cap 0    promethazine (PHENERGAN) 25 mg tablet Take 1 Tab by mouth every six (6) hours as needed. Indications: Nausea/Vomiting 12 Tab 0       Past History     Past Medical History:  Past Medical History:   Diagnosis Date    Hypertension        Past Surgical History:  History reviewed. No pertinent surgical history. Family History:  History reviewed. No pertinent family history.     Social History:  Social History     Tobacco Use    Smoking status: Never Smoker    Smokeless tobacco: Never Used   Substance Use Topics    Alcohol use: Yes     Comment: occational beer    Drug use: No       Allergies:  No Known Allergies      Review of Systems     Review of Systems   Constitutional: Negative for chills and fever. HENT: positive for nasal congestion, sore throat, rhinorrhea  Eyes: Negative. Respiratory: Negative cough and negative for shortness of breath. Cardiovascular: Negative for chest pain and palpitations. Gastrointestinal: Negative for abdominal pain, constipation, diarrhea, nausea and vomiting. Genitourinary: pos for vaginal discharge, negative for bleeding  Negative for difficulty urinating and flank pain. Musculoskeletal: Negative for back pain. Negative for gait problem and neck pain. Skin: Negative. Allergic/Immunologic: Negative. Neurological: Negative for dizziness, weakness, numbness and headaches. Psychiatric/Behavioral: Negative. All other systems reviewed and are negative. All Other Systems Negative  Physical Exam     Vitals:    01/17/20 1301   BP: (!) 140/119   Pulse: 67   Resp: 18   Temp: 97.1 °F (36.2 °C)   SpO2: 100%     Physical Exam  Vitals signs and nursing note reviewed. Constitutional:       General: She is not in acute distress. Appearance: She is well-developed. She is not diaphoretic. HENT:      Head: Normocephalic and atraumatic. Nose: Nose normal.   Eyes:      Conjunctiva/sclera: Conjunctivae normal.      Pupils: Pupils are equal, round, and reactive to light. Neck:      Musculoskeletal: Normal range of motion and neck supple. Cardiovascular:      Rate and Rhythm: Normal rate and regular rhythm. Pulmonary:      Effort: Pulmonary effort is normal. No respiratory distress. Breath sounds: Normal breath sounds. Abdominal:      General: There is no distension. Palpations: Abdomen is soft. Tenderness: There is no tenderness. Musculoskeletal: Normal range of motion. Skin:     General: Skin is warm. Findings: No rash. Neurological:      Mental Status: She is alert and oriented to person, place, and time. Cranial Nerves: No cranial nerve deficit. Coordination: Coordination normal.   Psychiatric:         Behavior: Behavior normal.           Diagnostic Study Results     Labs -   No results found for this or any previous visit (from the past 12 hour(s)). Radiologic Studies -   No orders to display     CT Results  (Last 48 hours)    None        CXR Results  (Last 48 hours)    None            Medical Decision Making   I am the first provider for this patient. I reviewed the vital signs, available nursing notes, past medical history, past surgical history, family history and social history. Vital Signs-Reviewed the patient's vital signs. Records Reviewed: Nursing notes, old medical records and any previous labs, imaging, visits, consultations pertinent to patient care    Procedures:  Procedures      ED Course: Progress Notes, Reevaluation, and Consults:    Provider Notes (Medical Decision Making):   61 yo female here with vaginal discharge. VSS, exam unremarkable. No concern for PID. No indication for pelvic exam and patient is deferring the need for one.  UA negative for UTI. Not pregnant. Wet prep c/w BV and trich Gonorrhea and chlamydia cultures sent, will treat. D/c home with gyn and pcp f/u. MED RECONCILIATION:  No current facility-administered medications for this encounter. Current Outpatient Medications   Medication Sig    metroNIDAZOLE (FLAGYL) 500 mg tablet Take 1 Tab by mouth two (2) times a day for 7 days.  ibuprofen (MOTRIN) 600 mg tablet Take 1 Tab by mouth every eight (8) hours as needed for Pain.  azithromycin (ZITHROMAX Z-SELVIN) 250 mg tablet Take two tablets the first day and then one every day thereafter until completion    aspirin delayed-release 81 mg tablet Take 1 Tab by mouth daily.     atorvastatin (LIPITOR) 20 mg tablet Take 1 Tab by mouth nightly.  folic acid (FOLVITE) 1 mg tablet Take 1 Tab by mouth daily.  therapeutic multivitamin (THERAGRAN) tablet Take 1 Tab by mouth daily.  thiamine (B-1) 100 mg tablet Take 1 Tab by mouth daily.  amLODIPine (NORVASC) 5 mg tablet Take 0.5 Tabs by mouth daily. Indications: hypertension    docusate sodium (COLACE) 100 mg capsule Take 1 capsule twice daily    promethazine (PHENERGAN) 25 mg tablet Take 1 Tab by mouth every six (6) hours as needed. Indications: Nausea/Vomiting       Disposition:  home    DISCHARGE NOTE:     Pt has been reexamined. Patient has no new complaints, changes, or physical findings. Care plan outlined and precautions discussed. Discussed proper way to take medications. Discussed treatment plan, return precautions, symptomatic relief, and expected time to improvement. All questions answered. Patient is stable for discharge and outpatient management. Patient is ready to go home. Follow-up Information     Follow up With Specialties Details Why Contact Info    Val Bansal MD Internal Medicine   [place] [address]  [phone]      [facility] EMERGENCY DEPT Emergency Medicine   [address] [place] [id number]  [phone]          Discharge Medication List as of 1/17/2020  1:06 PM      START taking these medications    Details   metroNIDAZOLE (FLAGYL) 500 mg tablet Take 1 Tab by mouth two (2) times a day for 7 days. , Print, Disp-14 Tab, R-0         CONTINUE these medications which have NOT CHANGED    Details   ibuprofen (MOTRIN) 600 mg tablet Take 1 Tab by mouth every eight (8) hours as needed for Pain., Print, Disp-20 Tab, R-0      azithromycin (ZITHROMAX Z-SELVIN) 250 mg tablet Take two tablets the first day and then one every day thereafter until completion, Print, Disp-6 Tab, R-0      aspirin delayed-release 81 mg tablet Take 1 Tab by mouth daily. , Print, Disp-30 Tab, R-0      atorvastatin (LIPITOR) 20 mg tablet Take 1 Tab by mouth nightly. , Print, Disp-30 Tab, R-0      folic acid (FOLVITE) 1 mg tablet Take 1 Tab by mouth daily. , Print, Disp-30 Tab, R-0      therapeutic multivitamin (THERAGRAN) tablet Take 1 Tab by mouth daily. , Print, Disp-30 Tab, R-0      thiamine (B-1) 100 mg tablet Take 1 Tab by mouth daily. , Print, Disp-30 Tab, R-0      amLODIPine (NORVASC) 5 mg tablet Take 0.5 Tabs by mouth daily. Indications: hypertension, Print, Disp-30 Tab, R-0      docusate sodium (COLACE) 100 mg capsule Take 1 capsule twice daily, Print, Disp-60 Cap, R-0      promethazine (PHENERGAN) 25 mg tablet Take 1 Tab by mouth every six (6) hours as needed. Indications: Nausea/Vomiting, Print, Disp-12 Tab, R-0                   Diagnosis     Clinical Impression:   1. Vaginal discharge    2. Trichimoniasis    3. BV (bacterial vaginosis)          Dictation disclaimer:  Please note that this dictation was completed with [employer], the [employer] voice recognition software. Quite often unanticipated grammatical, syntax, homophones, and other interpretive errors are inadvertently transcribed by the computer software. Please disregard these errors. Please excuse any errors that have escaped final proofreading.

## 2020-04-27 ENCOUNTER — HOSPITAL ENCOUNTER (EMERGENCY)
Age: 55
Discharge: HOME | End: 2020-04-27
Attending: EMERGENCY MEDICINE
Payer: MEDICAID

## 2020-04-27 ENCOUNTER — APPOINTMENT (OUTPATIENT)
Dept: GENERAL RADIOLOGY | Age: 55
End: 2020-04-27
Attending: NURSE PRACTITIONER
Payer: MEDICAID

## 2020-04-27 VITALS
OXYGEN SATURATION: 100 % | BODY MASS INDEX: 19.99 KG/M2 | RESPIRATION RATE: 18 BRPM | HEART RATE: 62 BPM | SYSTOLIC BLOOD PRESSURE: 147 MMHG | TEMPERATURE: 96.7 F | HEIGHT: 64 IN | WEIGHT: 117.1 LBS | DIASTOLIC BLOOD PRESSURE: 104 MMHG

## 2020-04-27 DIAGNOSIS — S91.342A PUNCTURE WOUND OF LEFT FOOT WITH FOREIGN BODY, INITIAL ENCOUNTER: ICD-10-CM

## 2020-04-27 DIAGNOSIS — N30.91 CYSTITIS WITH HEMATURIA: Primary | ICD-10-CM

## 2020-04-27 LAB
APPEARANCE UR: ABNORMAL
BACTERIA URNS QL MICRO: ABNORMAL /HPF
BILIRUB UR QL: NEGATIVE
COLOR UR: YELLOW
EPITH CASTS URNS QL MICRO: ABNORMAL /LPF (ref 0–5)
GLUCOSE UR STRIP.AUTO-MCNC: NEGATIVE MG/DL
HGB UR QL STRIP: ABNORMAL
KETONES UR QL STRIP.AUTO: NEGATIVE MG/DL
LEUKOCYTE ESTERASE UR QL STRIP.AUTO: ABNORMAL
NITRITE UR QL STRIP.AUTO: POSITIVE
PH UR STRIP: 5.5 [PH] (ref 5–8)
PROT UR STRIP-MCNC: NEGATIVE MG/DL
RBC #/AREA URNS HPF: ABNORMAL /HPF (ref 0–5)
SERVICE CMNT-IMP: NORMAL
SP GR UR REFRACTOMETRY: 1.01 (ref 1–1.03)
UROBILINOGEN UR QL STRIP.AUTO: 0.2 EU/DL (ref 0.2–1)
WBC URNS QL MICRO: ABNORMAL /HPF (ref 0–4)
WET PREP GENITAL: NORMAL

## 2020-04-27 PROCEDURE — 87210 SMEAR WET MOUNT SALINE/INK: CPT

## 2020-04-27 PROCEDURE — 90471 IMMUNIZATION ADMIN: CPT

## 2020-04-27 PROCEDURE — 74011250636 HC RX REV CODE- 250/636: Performed by: NURSE PRACTITIONER

## 2020-04-27 PROCEDURE — 74011250637 HC RX REV CODE- 250/637: Performed by: NURSE PRACTITIONER

## 2020-04-27 PROCEDURE — 99283 EMERGENCY DEPT VISIT LOW MDM: CPT

## 2020-04-27 PROCEDURE — 87491 CHLMYD TRACH DNA AMP PROBE: CPT

## 2020-04-27 PROCEDURE — 81001 URINALYSIS AUTO W/SCOPE: CPT

## 2020-04-27 PROCEDURE — 74011000250 HC RX REV CODE- 250: Performed by: NURSE PRACTITIONER

## 2020-04-27 PROCEDURE — 90715 TDAP VACCINE 7 YRS/> IM: CPT | Performed by: NURSE PRACTITIONER

## 2020-04-27 PROCEDURE — 73630 X-RAY EXAM OF FOOT: CPT

## 2020-04-27 PROCEDURE — 96372 THER/PROPH/DIAG INJ SC/IM: CPT

## 2020-04-27 RX ORDER — LIDOCAINE HYDROCHLORIDE AND EPINEPHRINE 10; 10 MG/ML; UG/ML
1.5 INJECTION, SOLUTION INFILTRATION; PERINEURAL ONCE
Status: COMPLETED | OUTPATIENT
Start: 2020-04-27 | End: 2020-04-27

## 2020-04-27 RX ORDER — AZITHROMYCIN 250 MG/1
1000 TABLET, FILM COATED ORAL
Status: COMPLETED | OUTPATIENT
Start: 2020-04-27 | End: 2020-04-27

## 2020-04-27 RX ORDER — CEPHALEXIN 500 MG/1
1000 CAPSULE ORAL 2 TIMES DAILY
Qty: 28 CAP | Refills: 0 | Status: SHIPPED | OUTPATIENT
Start: 2020-04-27 | End: 2020-05-04

## 2020-04-27 RX ADMIN — LIDOCAINE HYDROCHLORIDE,EPINEPHRINE BITARTRATE 15 MG: 10; .01 INJECTION, SOLUTION INFILTRATION; PERINEURAL at 16:29

## 2020-04-27 RX ADMIN — TETANUS TOXOID, REDUCED DIPHTHERIA TOXOID AND ACELLULAR PERTUSSIS VACCINE, ADSORBED 0.5 ML: 5; 2.5; 8; 8; 2.5 SUSPENSION INTRAMUSCULAR at 15:53

## 2020-04-27 RX ADMIN — AZITHROMYCIN MONOHYDRATE 1000 MG: 250 TABLET ORAL at 15:40

## 2020-04-27 RX ADMIN — LIDOCAINE HYDROCHLORIDE 250 MG: 10 INJECTION, SOLUTION EPIDURAL; INFILTRATION; INTRACAUDAL; PERINEURAL at 15:40

## 2020-04-27 NOTE — DISCHARGE INSTRUCTIONS
Patient Education        Urinary Tract Infection in Women: Care Instructions  Your Care Instructions    A urinary tract infection, or UTI, is a general term for an infection anywhere between the kidneys and the urethra (where urine comes out). Most UTIs are bladder infections. They often cause pain or burning when you urinate. UTIs are caused by bacteria and can be cured with antibiotics. Be sure to complete your treatment so that the infection goes away. Follow-up care is a key part of your treatment and safety. Be sure to make and go to all appointments, and call your doctor if you are having problems. It's also a good idea to know your test results and keep a list of the medicines you take. How can you care for yourself at home? · Take your antibiotics as directed. Do not stop taking them just because you feel better. You need to take the full course of antibiotics. · Drink extra water and other fluids for the next day or two. This may help wash out the bacteria that are causing the infection. (If you have kidney, heart, or liver disease and have to limit fluids, talk with your doctor before you increase your fluid intake.)  · Avoid drinks that are carbonated or have caffeine. They can irritate the bladder. · Urinate often. Try to empty your bladder each time. · To relieve pain, take a hot bath or lay a heating pad set on low over your lower belly or genital area. Never go to sleep with a heating pad in place. To prevent UTIs  · Drink plenty of water each day. This helps you urinate often, which clears bacteria from your system. (If you have kidney, heart, or liver disease and have to limit fluids, talk with your doctor before you increase your fluid intake.)  · Urinate when you need to. · Urinate right after you have sex. · Change sanitary pads often. · Avoid douches, bubble baths, feminine hygiene sprays, and other feminine hygiene products that have deodorants.   · After going to the bathroom, wipe from front to back. When should you call for help? Call your doctor now or seek immediate medical care if:    · Symptoms such as fever, chills, nausea, or vomiting get worse or appear for the first time.     · You have new pain in your back just below your rib cage. This is called flank pain.     · There is new blood or pus in your urine.     · You have any problems with your antibiotic medicine.    Watch closely for changes in your health, and be sure to contact your doctor if:    · You are not getting better after taking an antibiotic for 2 days.     · Your symptoms go away but then come back. Where can you learn more? Go to http://allie-francine.info/  Enter N520 in the search box to learn more about \"Urinary Tract Infection in Women: Care Instructions. \"  Current as of: August 21, 2019Content Version: 12.4  © 1018-5028 [employer]. Care instructions adapted under license by [employer] (which disclaims liability or warranty for this information). If you have questions about a medical condition or this instruction, always ask your healthcare professional. [address] any warranty or liability for your use of this information. Patient Education        Puncture Wounds: Care Instructions  Your Care Instructions    A puncture wound can happen anywhere on your body. These wounds tend to be narrower and deeper than cuts. A puncture wound is usually left open instead of being closed. This is because a puncture wound can be easily infected, and closing it can make infection even more likely. You will probably have a bandage over the wound. The doctor has checked you carefully, but problems can develop later. If you notice any problems or new symptoms, get medical treatment right away. Follow-up care is a key part of your treatment and safety. Be sure to make and go to all appointments, and call your doctor if you are having problems.  It's also a good idea to know your test results and keep a list of the medicines you take. How can you care for yourself at home? · Keep the wound dry for the first 24 to 48 hours. After this, you can shower if your doctor okays it. Pat the wound dry. · Don't soak the wound, such as in a bathtub. Your doctor will tell you when it's safe to get the wound wet. · If your doctor told you how to care for your wound, follow your doctor's instructions. If you did not get instructions, follow this general advice:  ? After the first 24 to 48 hours, wash the wound with clean water 2 times a day. Don't use hydrogen peroxide or alcohol, which can slow healing. ? You may cover the wound with a thin layer of petroleum jelly, such as Vaseline, and a nonstick bandage. ? Apply more petroleum jelly and replace the bandage as needed. · Prop up the sore area on pillows anytime you sit or lie down during the next 3 days. Try to keep it above the level of your heart. This helps reduce swelling. · Avoid any activity that could cause your wound to get worse. · Be safe with medicines. Read and follow all instructions on the label. ? If the doctor gave you a prescription medicine for pain, take it as prescribed. ? If you are not taking a prescription pain medicine, ask your doctor if you can take an over-the-counter medicine. · If your doctor prescribed antibiotics, take them as directed. Do not stop taking them just because you feel better. You need to take the full course of antibiotics. When should you call for help? Call your doctor now or seek immediate medical care if:    · You have new pain, or your pain gets worse.     · The wound starts to bleed, and blood soaks through the bandage.  Oozing small amounts of blood is normal.     · The skin near the wound is cold or pale or changes color.     · You have tingling, weakness, or numbness near the wound.     · You have trouble moving the area near the wound.     · You have symptoms of infection, such as:  ? Increased pain, swelling, warmth, or redness around the wound. ? Red streaks leading from the wound. ? Pus draining from the wound. ? A fever.    Watch closely for changes in your health, and be sure to contact your doctor if:    · The wound is not closing (getting smaller).     · You do not get better as expected. Where can you learn more? Go to http://lalie-francine.info/  Enter U265 in the search box to learn more about \"Puncture Wounds: Care Instructions. \"  Current as of: June 26, 2019Content Version: 12.4  © 7648-9307 [employer]. Care instructions adapted under license by [employer] (which disclaims liability or warranty for this information). If you have questions about a medical condition or this instruction, always ask your healthcare professional. [address] any warranty or liability for your use of this information.

## 2020-04-27 NOTE — ED PROVIDER NOTES
EMERGENCY DEPARTMENT HISTORY AND PHYSICAL EXAM    4:50 PM      Date: 4/27/2020  Patient Name: Alonzo Mckenna    History of Presenting Illness     Chief Complaint   Patient presents with    Other     glass in L foot    Vaginal Pain         History Provided By: Patient    Additional History (Context): Alonzo Mckenna is a 62 y.o. female with past medical history significant for hypertension who presents with two complaints. The first 1 is dysuria and burning at the end of micturition for the last month intermittently. She denies any hematuria, frequency, urgency, vaginal discharge, or abdominal pain. She would like to be tested and treated for STDs as she is sexually active. She also reports a glass foreign body in the plantar aspect of the left foot that happened 2 days ago. Something glass broke in her home and she accidentally stepped on a small shard. She has been trying to get out herself without status. She is unsure of her last tetanus vaccination. She is not a diabetic. PCP: Monica Bernal MD    Current Outpatient Medications   Medication Sig Dispense Refill    cephALEXin (Keflex) 500 mg capsule Take 2 Caps by mouth two (2) times a day for 7 days. 28 Cap 0    ibuprofen (MOTRIN) 600 mg tablet Take 1 Tab by mouth every eight (8) hours as needed for Pain. 20 Tab 0    azithromycin (ZITHROMAX Z-SELVIN) 250 mg tablet Take two tablets the first day and then one every day thereafter until completion 6 Tab 0    aspirin delayed-release 81 mg tablet Take 1 Tab by mouth daily. 30 Tab 0    atorvastatin (LIPITOR) 20 mg tablet Take 1 Tab by mouth nightly. 30 Tab 0    folic acid (FOLVITE) 1 mg tablet Take 1 Tab by mouth daily. 30 Tab 0    therapeutic multivitamin (THERAGRAN) tablet Take 1 Tab by mouth daily. 30 Tab 0    thiamine (B-1) 100 mg tablet Take 1 Tab by mouth daily. 30 Tab 0    amLODIPine (NORVASC) 5 mg tablet Take 0.5 Tabs by mouth daily.  Indications: hypertension 30 Tab 0    docusate sodium (COLACE) 100 mg capsule Take 1 capsule twice daily 60 Cap 0    promethazine (PHENERGAN) 25 mg tablet Take 1 Tab by mouth every six (6) hours as needed. Indications: Nausea/Vomiting 12 Tab 0       Past History     Past Medical History:  Past Medical History:   Diagnosis Date    Hypertension        Past Surgical History:  History reviewed. No pertinent surgical history. Family History:  History reviewed. No pertinent family history. Social History:  Social History     Tobacco Use    Smoking status: Never Smoker    Smokeless tobacco: Never Used   Substance Use Topics    Alcohol use: Yes     Comment: occational beer    Drug use: No       Allergies:  No Known Allergies      Review of Systems       Review of Systems   Constitutional: Negative. Negative for chills and fever. HENT: Negative. Negative for congestion, ear pain and rhinorrhea. Eyes: Negative. Negative for pain and redness. Respiratory: Negative. Negative for cough and shortness of breath. Cardiovascular: Negative. Negative for chest pain, palpitations and leg swelling. Gastrointestinal: Negative. Negative for abdominal pain, constipation, diarrhea, nausea and vomiting. Genitourinary: Positive for dysuria. Negative for decreased urine volume, difficulty urinating, dyspareunia, enuresis, flank pain, frequency, genital sores, hematuria, menstrual problem, pelvic pain, urgency, vaginal bleeding, vaginal discharge and vaginal pain. Musculoskeletal: Negative. Negative for back pain, gait problem, joint swelling and neck pain. Skin: Positive for wound (Left plantar foot foreign body). Negative for rash. Neurological: Negative. Negative for dizziness, seizures, speech difficulty, weakness, light-headedness and headaches. Hematological: Negative for adenopathy. Does not bruise/bleed easily. All other systems reviewed and are negative.         Physical Exam     Visit Vitals  BP (!) 147/104 (BP 1 Location: Left arm, BP Patient Position: At rest;Sitting)   Pulse 62   Temp 96.7 °F (35.9 °C)   Resp 18   Ht 5' 4\" (1.626 m)   Wt 53.1 kg (117 lb 1.6 oz)   SpO2 100%   BMI 20.10 kg/m²           Physical Exam  Vitals signs and nursing note reviewed. Constitutional:       General: She is not in acute distress. Appearance: Normal appearance. She is not ill-appearing, toxic-appearing or diaphoretic. HENT:      Head: Normocephalic and atraumatic. Nose: Nose normal.      Mouth/Throat:      Mouth: Mucous membranes are moist.      Pharynx: Oropharynx is clear. Eyes:      General: Lids are normal. Vision grossly intact. Conjunctiva/sclera: Conjunctivae normal.      Pupils: Pupils are equal, round, and reactive to light. Neck:      Musculoskeletal: Full passive range of motion without pain and normal range of motion. Cardiovascular:      Rate and Rhythm: Normal rate and regular rhythm. Pulses: Normal pulses. Dorsalis pedis pulses are 2+ on the right side and 2+ on the left side. Heart sounds: Normal heart sounds. No murmur. No friction rub. No gallop. Pulmonary:      Effort: Pulmonary effort is normal. No respiratory distress. Breath sounds: Normal breath sounds. No stridor. No wheezing, rhonchi or rales. Chest:      Chest wall: No tenderness. Abdominal:      Palpations: Abdomen is soft. Tenderness: There is no abdominal tenderness. There is no right CVA tenderness, left CVA tenderness, guarding or rebound. Musculoskeletal: Normal range of motion. Feet:    Skin:     General: Skin is warm and dry. Capillary Refill: Capillary refill takes less than 2 seconds. Neurological:      General: No focal deficit present. Mental Status: She is alert and oriented to person, place, and time. Psychiatric:         Mood and Affect: Mood normal.         Behavior: Behavior normal. Behavior is cooperative.              Diagnostic Study Results     Labs -  Recent Results (from the past 12 hour(s))   URINALYSIS W/ RFLX MICROSCOPIC    Collection Time: 04/27/20  3:23 PM   Result Value Ref Range    Color YELLOW      Appearance CLOUDY      Specific gravity 1.013 1.005 - 1.030      pH (UA) 5.5 5.0 - 8.0      Protein Negative NEG mg/dL    Glucose Negative NEG mg/dL    Ketone Negative NEG mg/dL    Bilirubin Negative NEG      Blood TRACE (A) NEG      Urobilinogen 0.2 0.2 - 1.0 EU/dL    Nitrites Positive (A) NEG      Leukocyte Esterase MODERATE (A) NEG     URINE MICROSCOPIC ONLY    Collection Time: 04/27/20  3:23 PM   Result Value Ref Range    WBC 15 to 20 0 - 4 /hpf    RBC 1 to 3 0 - 5 /hpf    Epithelial cells 3+ 0 - 5 /lpf    Bacteria 4+ (A) NEG /hpf   WET PREP    Collection Time: 04/27/20  3:25 PM   Result Value Ref Range    Special Requests: NO SPECIAL REQUESTS      Wet prep NO YEAST,TRICHOMONAS OR CLUE CELLS NOTED         Radiologic Studies -   XR FOOT LT MIN 3 V    (Results Pending)         Medical Decision Making   I am the first provider for this patient. I reviewed available nursing notes, past medical history, past surgical history, family history and social history. Vital Signs-Reviewed the patient's vital signs. Records Reviewed: Nursing Notes and Old Medical Records (Time of Review: 4:50 PM)    Pulse Oximetry Analysis - 100% on room air      ED Course: Progress Notes, Reevaluation, and Consults:  4:50 PM  Initial assessment performed. The patients presenting problems have been discussed, and they/their family are in agreement with the care plan formulated and outlined with them. I have encouraged them to ask questions as they arise throughout their visit.     Foreign Body Removal  Date/Time: 4/27/2020 5:02 PM  Performed by: PEDRITO Irizarry  Authorized by: PEDRITO Irizarry     Consent:     Consent obtained:  Verbal    Consent given by:  Patient    Risks discussed:  Bleeding, infection and incomplete removal    Alternatives discussed:  No treatment, observation and alternative treatment  Location:     Location:  Foot    Foot location:  L sole    Depth: Intradermal    Tendon involvement:  None  Pre-procedure details:     Imaging:  X-ray    Preparation: Patient was prepped and draped in usual sterile fashion    Anesthesia (see MAR for exact dosages): Anesthesia method:  Local infiltration    Local anesthetic:  Lidocaine 1% WITH epi  Procedure type:     Procedure complexity:  Simple  Procedure details:     Scalpel size:  11    Localization method:  Probed    Dissection of underlying tissues: no      Bloodless field: yes      Removal mechanism: Forceps    Foreign bodies recovered:  1    Description:  Glass shard, 4 mm    Intact foreign body removal: yes    Post-procedure details:     Neurovascular status: intact      Confirmation:  No additional foreign bodies on visualization    Skin closure:  None    Dressing:  Bulky dressing    Patient tolerance of procedure: Tolerated well, no immediate complications        Provider Notes (Medical Decision Making):     78-year-old female presents to the ER with complaints of dysuria and a left plantar foot puncture wound. There is no evidence of superficial cellulitis surrounding the puncture wound. Regarding the dysuria she also has no vaginal discharge but would like to be tested for STDs. She was given empiric treatment for gonorrhea and chlamydia. Will obtain appropriate studies to evaluate patient's complaints and treat symptomatically. Will disposition after reassessment assuming no clinical change or worsening and appropriate response to symptomatic treatment. Left foot x-ray shows a superficial foreign body in the area over the puncture wound. No other signs of acute bony abnormality. Urinalysis is positive for nitrates and moderate leukocytes. Patient elected to perform a self swab and wet prep is negative for yeast, trichomonas, or clue cells. Pending GC/CT.   An I&D of the left foot was performed and successful removal of the glass foreign body was obtained. She tolerated this well. Will treat with cephalexin to cover both contaminated plantar puncture wound and urinary tract infection. She was educated to follow-up with the [address] [place] [address] STD clinic for further testing including HIV, syphilis, herpes, and hepatitis. She was given strict ER return precautions and to look for signs of infection in the foot. Diagnosis     Clinical Impression:   1. Cystitis with hematuria    2. Puncture wound of left foot with foreign body, initial encounter        Disposition: Discharged home in stable condition    DISCHARGE NOTE:     Patient has been reexamined. Patient has no new complaints, changes, or physical findings. Care plan outlined and precautions discussed. Results of x-ray and labs were reviewed with the patient. All medications were reviewed with the patient; will discharge home with cephalexin. All of patient's questions and concerns were addressed. Patient was instructed and agrees to follow up with PCP/[facility] STD clinic, as well as to return to the ED upon further deterioration. Patient is ready to go home. Follow-up Information     Follow up With Specialties Details Why [address] [facility] EMERGENCY DEPT Emergency Medicine  As needed, If symptoms worsen [address] [place]  [phone]    Matt Ramirez MD Internal Medicine Schedule an appointment as soon as possible for a visit Follow-up from the Emergency Department [address] [place] [id number] [phone]             Current Discharge Medication List      START taking these medications    Details   cephALEXin (Keflex) 500 mg capsule Take 2 Caps by mouth two (2) times a day for 7 days. Qty: 28 Cap, Refills: 0         CONTINUE these medications which have NOT CHANGED    Details   ibuprofen (MOTRIN) 600 mg tablet Take 1 Tab by mouth every eight (8) hours as needed for Pain.   Qty: 20 Tab, Refills: 0      azithromycin (ZITHROMAX Z-SELVIN) 250 mg tablet Take two tablets the first day and then one every day thereafter until completion  Qty: 6 Tab, Refills: 0      aspirin delayed-release 81 mg tablet Take 1 Tab by mouth daily. Qty: 30 Tab, Refills: 0      atorvastatin (LIPITOR) 20 mg tablet Take 1 Tab by mouth nightly. Qty: 30 Tab, Refills: 0      folic acid (FOLVITE) 1 mg tablet Take 1 Tab by mouth daily. Qty: 30 Tab, Refills: 0      therapeutic multivitamin (THERAGRAN) tablet Take 1 Tab by mouth daily. Qty: 30 Tab, Refills: 0      thiamine (B-1) 100 mg tablet Take 1 Tab by mouth daily. Qty: 30 Tab, Refills: 0      amLODIPine (NORVASC) 5 mg tablet Take 0.5 Tabs by mouth daily. Indications: hypertension  Qty: 30 Tab, Refills: 0      docusate sodium (COLACE) 100 mg capsule Take 1 capsule twice daily  Qty: 60 Cap, Refills: 0      promethazine (PHENERGAN) 25 mg tablet Take 1 Tab by mouth every six (6) hours as needed. Indications: Nausea/Vomiting  Qty: 12 Tab, Refills: 0               Dictation disclaimer:  Please note that this dictation was completed with ClarityAd, the computer voice recognition software. Quite often unanticipated grammatical, syntax, homophones, and other interpretive errors are inadvertently transcribed by the computer software. Please disregard these errors. Please excuse any errors that have escaped final proofreading.

## 2020-10-28 ENCOUNTER — TRANSCRIBE ORDER (OUTPATIENT)
Dept: SCHEDULING | Age: 55
End: 2020-10-28

## 2020-10-28 DIAGNOSIS — Z12.31 VISIT FOR SCREENING MAMMOGRAM: Primary | ICD-10-CM

## 2020-10-31 ENCOUNTER — HOSPITAL ENCOUNTER (EMERGENCY)
Age: 55
Discharge: HOME | End: 2020-10-31
Attending: EMERGENCY MEDICINE
Payer: MEDICAID

## 2020-10-31 VITALS
SYSTOLIC BLOOD PRESSURE: 115 MMHG | DIASTOLIC BLOOD PRESSURE: 96 MMHG | HEART RATE: 81 BPM | RESPIRATION RATE: 18 BRPM | TEMPERATURE: 97.5 F | OXYGEN SATURATION: 100 %

## 2020-10-31 DIAGNOSIS — E87.6 HYPOKALEMIA: ICD-10-CM

## 2020-10-31 DIAGNOSIS — F11.10 HEROIN ABUSE (HCC): Primary | ICD-10-CM

## 2020-10-31 DIAGNOSIS — F14.10 COCAINE ABUSE (HCC): ICD-10-CM

## 2020-10-31 LAB
AMPHET UR QL SCN: NEGATIVE
ANION GAP SERPL CALC-SCNC: 13 MMOL/L (ref 3–18)
APAP SERPL-MCNC: <2 UG/ML (ref 10–30)
BARBITURATES UR QL SCN: NEGATIVE
BASOPHILS # BLD: 0 K/UL (ref 0–0.1)
BASOPHILS NFR BLD: 0 % (ref 0–2)
BENZODIAZ UR QL: NEGATIVE
BUN SERPL-MCNC: 11 MG/DL (ref 7–18)
BUN/CREAT SERPL: 13 (ref 12–20)
CALCIUM SERPL-MCNC: 8.7 MG/DL (ref 8.5–10.1)
CANNABINOIDS UR QL SCN: NEGATIVE
CHLORIDE SERPL-SCNC: 104 MMOL/L (ref 100–111)
CO2 SERPL-SCNC: 21 MMOL/L (ref 21–32)
COCAINE UR QL SCN: POSITIVE
CREAT SERPL-MCNC: 0.82 MG/DL (ref 0.6–1.3)
DIFFERENTIAL METHOD BLD: ABNORMAL
EOSINOPHIL # BLD: 0 K/UL (ref 0–0.4)
EOSINOPHIL NFR BLD: 0 % (ref 0–5)
ERYTHROCYTE [DISTWIDTH] IN BLOOD BY AUTOMATED COUNT: 12.5 % (ref 11.6–14.5)
GLUCOSE SERPL-MCNC: 119 MG/DL (ref 74–99)
HCT VFR BLD AUTO: 35.8 % (ref 35–45)
HDSCOM,HDSCOM: ABNORMAL
HGB BLD-MCNC: 13 G/DL (ref 12–16)
LYMPHOCYTES # BLD: 1.1 K/UL (ref 0.9–3.6)
LYMPHOCYTES NFR BLD: 41 % (ref 21–52)
MCH RBC QN AUTO: 36.8 PG (ref 24–34)
MCHC RBC AUTO-ENTMCNC: 36.3 G/DL (ref 31–37)
MCV RBC AUTO: 101.4 FL (ref 74–97)
METHADONE UR QL: NEGATIVE
MONOCYTES # BLD: 0.3 K/UL (ref 0.05–1.2)
MONOCYTES NFR BLD: 12 % (ref 3–10)
NEUTS SEG # BLD: 1.2 K/UL (ref 1.8–8)
NEUTS SEG NFR BLD: 47 % (ref 40–73)
OPIATES UR QL: POSITIVE
PCP UR QL: NEGATIVE
PLATELET # BLD AUTO: 170 K/UL (ref 135–420)
PMV BLD AUTO: 8.9 FL (ref 9.2–11.8)
POTASSIUM SERPL-SCNC: 2.5 MMOL/L (ref 3.5–5.5)
RBC # BLD AUTO: 3.53 M/UL (ref 4.2–5.3)
SALICYLATES SERPL-MCNC: <1.7 MG/DL (ref 2.8–20)
SODIUM SERPL-SCNC: 138 MMOL/L (ref 136–145)
WBC # BLD AUTO: 2.6 K/UL (ref 4.6–13.2)

## 2020-10-31 PROCEDURE — 99285 EMERGENCY DEPT VISIT HI MDM: CPT

## 2020-10-31 PROCEDURE — 74011250636 HC RX REV CODE- 250/636: Performed by: STUDENT IN AN ORGANIZED HEALTH CARE EDUCATION/TRAINING PROGRAM

## 2020-10-31 PROCEDURE — 80048 BASIC METABOLIC PNL TOTAL CA: CPT

## 2020-10-31 PROCEDURE — 85025 COMPLETE CBC W/AUTO DIFF WBC: CPT

## 2020-10-31 PROCEDURE — 93005 ELECTROCARDIOGRAM TRACING: CPT

## 2020-10-31 PROCEDURE — 80307 DRUG TEST PRSMV CHEM ANLYZR: CPT

## 2020-10-31 PROCEDURE — 96366 THER/PROPH/DIAG IV INF ADDON: CPT

## 2020-10-31 PROCEDURE — 96361 HYDRATE IV INFUSION ADD-ON: CPT

## 2020-10-31 PROCEDURE — 74011250637 HC RX REV CODE- 250/637: Performed by: STUDENT IN AN ORGANIZED HEALTH CARE EDUCATION/TRAINING PROGRAM

## 2020-10-31 PROCEDURE — 96365 THER/PROPH/DIAG IV INF INIT: CPT

## 2020-10-31 RX ORDER — THERA TABS 400 MCG
1 TAB ORAL DAILY
Status: DISCONTINUED | OUTPATIENT
Start: 2020-11-01 | End: 2020-11-01 | Stop reason: HOSPADM

## 2020-10-31 RX ORDER — POTASSIUM CHLORIDE 7.45 MG/ML
10 INJECTION INTRAVENOUS
Status: COMPLETED | OUTPATIENT
Start: 2020-10-31 | End: 2020-10-31

## 2020-10-31 RX ADMIN — POTASSIUM CHLORIDE 10 MEQ: 7.46 INJECTION, SOLUTION INTRAVENOUS at 20:16

## 2020-10-31 RX ADMIN — POTASSIUM BICARBONATE 40 MEQ: 782 TABLET, EFFERVESCENT ORAL at 18:51

## 2020-10-31 RX ADMIN — POTASSIUM CHLORIDE 10 MEQ: 7.46 INJECTION, SOLUTION INTRAVENOUS at 18:51

## 2020-10-31 RX ADMIN — SODIUM CHLORIDE 1000 ML: 900 INJECTION, SOLUTION INTRAVENOUS at 18:08

## 2020-10-31 NOTE — ED NOTES
Per Ems patient was found unresponsive from family, arrived and patient had aganol breathing with pinpoint pupils and unresponsive. 0.5 of narcan IV given patient was then aroused. Blood sugar was 126.

## 2020-10-31 NOTE — ED PROVIDER NOTES
38-year-old female brought in via EMS after being found unresponsive by family member. Per EMS patient was found unresponsive with agonal breathing, given 0.4 mg Narcan IV with improvement in symptoms. Upon arrival patient is awake, alert, oriented satting 100% on room air. Patient states that she went out drinking last night and admits to cocaine use. Denies any other symptoms at this time to include chest pain, shortness of breath, nausea, vomiting. Past Medical History:   Diagnosis Date    Hypertension        No past surgical history on file. No family history on file.     Social History     Socioeconomic History    Marital status: SINGLE     Spouse name: Not on file    Number of children: Not on file    Years of education: Not on file    Highest education level: Not on file   Occupational History    Not on file   Social Needs    Financial resource strain: Not on file    Food insecurity     Worry: Not on file     Inability: Not on file    Transportation needs     Medical: Not on file     Non-medical: Not on file   Tobacco Use    Smoking status: Never Smoker    Smokeless tobacco: Never Used   Substance and Sexual Activity    Alcohol use: Yes     Comment: occational beer    Drug use: No    Sexual activity: Not on file   Lifestyle    Physical activity     Days per week: Not on file     Minutes per session: Not on file    Stress: Not on file   Relationships    Social connections     Talks on phone: Not on file     Gets together: Not on file     Attends Rastafari service: Not on file     Active member of club or organization: Not on file     Attends meetings of clubs or organizations: Not on file     Relationship status: Not on file    Intimate partner violence     Fear of current or ex partner: Not on file     Emotionally abused: Not on file     Physically abused: Not on file     Forced sexual activity: Not on file   Other Topics Concern    Not on file   Social History Narrative  Not on file     Current Outpatient Medications   Medication Instructions    amLODIPine (NORVASC) 2.5 mg, Oral, DAILY    aspirin delayed-release 81 mg, Oral, DAILY    atorvastatin (LIPITOR) 20 mg, Oral, EVERY BEDTIME    azithromycin (ZITHROMAX Z-SELVIN) 250 mg tablet Take two tablets the first day and then one every day thereafter until completion    docusate sodium (COLACE) 100 mg capsule Take 1 capsule twice daily    folic acid (FOLVITE) 1 mg, Oral, DAILY    ibuprofen (MOTRIN) 600 mg, Oral, EVERY 8 HOURS AS NEEDED    promethazine (PHENERGAN) 25 mg, Oral, EVERY 6 HOURS AS NEEDED    therapeutic multivitamin (THERAGRAN) tablet 1 Tab, Oral, DAILY    thiamine HCL (B-1) 100 mg, Oral, DAILY         ALLERGIES: Patient has no known allergies. Review of Systems   Constitutional: Negative for chills and fever. HENT: Negative for sore throat. Eyes: Negative for visual disturbance. Respiratory: Negative for cough and shortness of breath. Cardiovascular: Negative for chest pain and palpitations. Gastrointestinal: Negative for abdominal pain, nausea and vomiting. Genitourinary: Negative for dysuria. Musculoskeletal: Negative for back pain. Skin: Negative for rash. Neurological: Negative for seizures and headaches. All other systems reviewed and are negative. Vitals:    10/31/20 1740 10/31/20 1745   BP: (!) 141/106 (!) 141/104   Pulse: 85 85   Resp:  16   Temp: 97.5 °F (36.4 °C)    SpO2: 100% 100%            Physical Exam  Vitals signs and nursing note reviewed. Constitutional:       Appearance: Normal appearance. HENT:      Head: Normocephalic and atraumatic. Right Ear: External ear normal.      Left Ear: External ear normal.      Nose: Nose normal.      Mouth/Throat:      Mouth: Mucous membranes are moist.      Pharynx: Oropharynx is clear. Eyes:      Extraocular Movements: Extraocular movements intact.       Comments: Pinpoint pupils bilaterally   Neck:      Musculoskeletal: Normal range of motion and neck supple. Cardiovascular:      Rate and Rhythm: Normal rate and regular rhythm. Pulses: Normal pulses. Pulmonary:      Effort: Pulmonary effort is normal.      Breath sounds: Normal breath sounds. Abdominal:      General: Abdomen is flat. Palpations: Abdomen is soft. Tenderness: There is no abdominal tenderness. Musculoskeletal: Normal range of motion. General: No tenderness. Skin:     General: Skin is warm and dry. Neurological:      General: No focal deficit present. Mental Status: She is alert and oriented to person, place, and time. Psychiatric:         Mood and Affect: Mood normal.         Behavior: Behavior normal.        Recent Results (from the past 12 hour(s))   CBC WITH AUTOMATED DIFF    Collection Time: 10/31/20  5:53 PM   Result Value Ref Range    WBC 2.6 (L) 4.6 - 13.2 K/uL    RBC 3.53 (L) 4.20 - 5.30 M/uL    HGB 13.0 12.0 - 16.0 g/dL    HCT 35.8 35.0 - 45.0 %    .4 (H) 74.0 - 97.0 FL    MCH 36.8 (H) 24.0 - 34.0 PG    MCHC 36.3 31.0 - 37.0 g/dL    RDW 12.5 11.6 - 14.5 %    PLATELET 467 492 - 367 K/uL    MPV 8.9 (L) 9.2 - 11.8 FL    NEUTROPHILS 47 40 - 73 %    LYMPHOCYTES 41 21 - 52 %    MONOCYTES 12 (H) 3 - 10 %    EOSINOPHILS 0 0 - 5 %    BASOPHILS 0 0 - 2 %    ABS. NEUTROPHILS 1.2 (L) 1.8 - 8.0 K/UL    ABS. LYMPHOCYTES 1.1 0.9 - 3.6 K/UL    ABS. MONOCYTES 0.3 0.05 - 1.2 K/UL    ABS. EOSINOPHILS 0.0 0.0 - 0.4 K/UL    ABS.  BASOPHILS 0.0 0.0 - 0.1 K/UL    DF AUTOMATED     METABOLIC PANEL, BASIC    Collection Time: 10/31/20  5:53 PM   Result Value Ref Range    Sodium 138 136 - 145 mmol/L    Potassium 2.5 (LL) 3.5 - 5.5 mmol/L    Chloride 104 100 - 111 mmol/L    CO2 21 21 - 32 mmol/L    Anion gap 13 3.0 - 18 mmol/L    Glucose 119 (H) 74 - 99 mg/dL    BUN 11 7.0 - 18 MG/DL    Creatinine 0.82 0.6 - 1.3 MG/DL    BUN/Creatinine ratio 13 12 - 20      GFR est AA >60 >60 ml/min/1.73m2    GFR est non-AA >60 >60 ml/min/1.73m2    Calcium 8.7 8.5 - 90.6 MG/DL   SALICYLATE    Collection Time: 10/31/20  5:53 PM   Result Value Ref Range    Salicylate level <5.8 (L) 2.8 - 20.0 MG/DL   ACETAMINOPHEN    Collection Time: 10/31/20  5:53 PM   Result Value Ref Range    Acetaminophen level <2 (L) 10.0 - 30.0 ug/mL   EKG, 12 LEAD, INITIAL    Collection Time: 10/31/20  6:03 PM   Result Value Ref Range    Ventricular Rate 82 BPM    Atrial Rate 82 BPM    P-R Interval 220 ms    QRS Duration 112 ms    Q-T Interval 410 ms    QTC Calculation (Bezet) 479 ms    Calculated P Axis 68 degrees    Calculated R Axis 32 degrees    Calculated T Axis 70 degrees    Diagnosis       Sinus rhythm with 1st degree AV block  Possible Left atrial enlargement  Left ventricular hypertrophy  Cannot rule out Septal infarct (cited on or before 25-OCT-2017)  Abnormal ECG  When compared with ECG of 31-JUL-2018 21:46,  TX interval has increased     DRUG SCREEN, URINE    Collection Time: 10/31/20  7:00 PM   Result Value Ref Range    BENZODIAZEPINES Negative NEG      BARBITURATES Negative NEG      THC (TH-CANNABINOL) Negative NEG      OPIATES Positive (A) NEG      PCP(PHENCYCLIDINE) Negative NEG      COCAINE Positive (A) NEG      AMPHETAMINES Negative NEG      METHADONE Negative NEG      HDSCOM (NOTE)      EKG: Interpreted by me, rhythm with first-degree AV block at a rate of 82, TX interval 220, QTc 479. MDM  Number of Diagnoses or Management Options  Cocaine abuse ([facility] [address]): Hypokalemia:   Diagnosis management comments: 57-year-old female with known history of alcohol and cocaine use earlier today brought in via EMS after being found unresponsive with agonal respirations. Responded to 0.4 mg Narcan IV administered by EMS. Patient now awake, alert, oriented, well-appearing, no acute distress. Denies any chest pain, shortness of breath. Hypokalemia noted on labs. Ordered 40 mg p.o. potassium and 20 mg IV potassium to given over the next 2 hours. UDS positive for opiates and cocaine.   Suspect episode due to heroin overdose. Patient has remained stable, in no acute distress. Precautions given. Will discharge home after completion of vitamin K. Patient verbalized understanding agreed with plan. Clinical impression: Opiate overdose  Disposition: Discharge home    I personally saw and examined the patient. I have reviewed and agree with the resident's findings, including all diagnostic interpretations, and plans as written. I was present during the key portions of separately billed procedures.     [address], DO

## 2020-11-01 LAB
ATRIAL RATE: 82 BPM
CALCULATED P AXIS, ECG09: 68 DEGREES
CALCULATED R AXIS, ECG10: 32 DEGREES
CALCULATED T AXIS, ECG11: 70 DEGREES
DIAGNOSIS, 93000: NORMAL
P-R INTERVAL, ECG05: 220 MS
Q-T INTERVAL, ECG07: 410 MS
QRS DURATION, ECG06: 112 MS
QTC CALCULATION (BEZET), ECG08: 479 MS
VENTRICULAR RATE, ECG03: 82 BPM

## 2020-11-01 NOTE — DISCHARGE INSTRUCTIONS
Patient Education        Cocaine Use: Care Instructions  Overview    Using cocaine can cause physical and mental harm. It can increase your heart rate and blood pressure, which can lead to a heart attack and even death. It can raise your body temperature. You may have nausea, vomiting, and chills. If you smoke cocaine, the fumes can cause breathing problems. If you snort cocaine, it can damage your nasal passages. If you inject cocaine, it can cause an abscess at the injection site or an infection throughout your body. You may become shaky and restless. You also may see or hear things that are not there (hallucinations) or believe things that are not true (delusions). When the doctor treated you, he or she may have:  · Watched your symptoms or done tests to find out how much cocaine was in your body. · Treated you to control your heart rate, temperature, and blood pressure. · Given you oxygen to help you breathe. · Given you medicine to settle your thoughts and help keep you calm. The doctor has checked you carefully, but problems can develop later. If you notice any problems or new symptoms, get medical treatment right away. How can you care for yourself at home? · When you use cocaine regularly, your body and brain get used to it. This is called physical dependence. If you are physically dependent on cocaine, you may have withdrawal symptoms when you stop using it. You may feel drowsy, have vivid dreams, or feel hungry, tired, or depressed. You may also feel confused and have trouble thinking clearly. To help you get past these symptoms:  ? Get plenty of rest.  ? Drink lots of fluids. ? Stay active. ? Eat a healthy diet. · Talk to your doctor about substance use treatment programs that can help you stop using cocaine. When should you call for help? Call  911 anytime you think you may need emergency care. For example, call if:    · You have symptoms of a heart attack. These may include:  ?  Chest pain or pressure, or a strange feeling in the chest.  ? Sweating. ? Shortness of breath. ? Nausea or vomiting. ? Pain, pressure, or a strange feeling in the back, neck, jaw, or upper belly or in one or both shoulders or arms. ? A fast or irregular heartbeat. After you call 911, the  may tell you to chew 1 adult-strength or 2 to 4 low-dose aspirin. Wait for an ambulance. Do not try to drive yourself.     · You feel you cannot stop from hurting yourself or someone else. Call your doctor now or seek immediate medical care if:    · You have severe side effects from using cocaine. These may include problems with thinking, such as seeing things that aren't there or thinking that someone is trying to harm you (paranoia).     · You have new or worse withdrawal symptoms. Watch closely for changes in your health, and be sure to contact your doctor if:    · You need more help or support to stop. Where can you learn more? Go to http://www.gray.com/  Enter N020368 in the search box to learn more about \"Cocaine Use: Care Instructions. \"  Current as of: June 29, 2020               Content Version: 12.6  © 5165-0148 [employer]. Care instructions adapted under license by [employer] (which disclaims liability or warranty for this information). If you have questions about a medical condition or this instruction, always ask your healthcare professional. [place] [address] any warranty or liability for your use of this information. Patient Education        Hypokalemia: Care Instructions  Your Care Instructions     Hypokalemia (say \"dc-dp-lnl-SHAYE-chantale-uh\") is a low level of potassium. The heart, muscles, kidneys, and nervous system all need potassium to work well. This problem has many different causes. Kidney problems, diet, and medicines like diuretics and laxatives can cause it. So can vomiting or diarrhea. In some cases, cancer is the cause.  Your doctor may do tests to find the cause of your low potassium levels. You may need medicines to bring your potassium levels back to normal. You may also need regular blood tests to check your potassium. If you have very low potassium, you may need intravenous (IV) medicines. You also may need tests to check the electrical activity of your heart. Heart problems caused by low potassium levels can be very serious. Follow-up care is a key part of your treatment and safety. Be sure to make and go to all appointments, and call your doctor if you are having problems. It's also a good idea to know your test results and keep a list of the medicines you take. How can you care for yourself at home? · If your doctor recommends it, eat foods that have a lot of potassium. These include fresh fruits, juices, and vegetables. They also include nuts, beans, and milk. · Be safe with medicines. If your doctor prescribes medicines or potassium supplements, take them exactly as directed. Call your doctor if you have any problems with your medicines. · Get your potassium levels tested as often as your doctor tells you. When should you call for help? Call 911 anytime you think you may need emergency care. For example, call if:    · You feel like your heart is missing beats. Heart problems caused by low potassium can cause death.     · You passed out (lost consciousness).     · You have a seizure. Call your doctor now or seek immediate medical care if:    · You feel weak or unusually tired.     · You have severe arm or leg cramps.     · You have tingling or numbness.     · You feel sick to your stomach, or you vomit.     · You have belly cramps.     · You feel bloated or constipated.     · You have to urinate a lot.     · You feel very thirsty most of the time.     · You are dizzy or lightheaded, or you feel like you may faint.     · You feel depressed, or you lose touch with reality.    Watch closely for changes in your health, and be sure to contact your doctor if:    · You do not get better as expected. Where can you learn more? Go to http://www.gray.com/  Enter G358 in the search box to learn more about \"Hypokalemia: Care Instructions. \"  Current as of: March 31, 2020               Content Version: 12.6  © 0043-9669 [employer]. Care instructions adapted under license by [employer] (which disclaims liability or warranty for this information). If you have questions about a medical condition or this instruction, always ask your healthcare professional. [address] any warranty or liability for your use of this information.

## 2020-11-30 ENCOUNTER — TRANSCRIBE ORDER (OUTPATIENT)
Dept: SCHEDULING | Age: 55
End: 2020-11-30

## 2020-11-30 DIAGNOSIS — Z12.31 VISIT FOR SCREENING MAMMOGRAM: Primary | ICD-10-CM

## 2021-07-30 ENCOUNTER — HOSPITAL ENCOUNTER (INPATIENT)
Age: 56
LOS: 1 days | Discharge: HOME | DRG: 201 | End: 2021-07-31
Attending: EMERGENCY MEDICINE | Admitting: STUDENT IN AN ORGANIZED HEALTH CARE EDUCATION/TRAINING PROGRAM
Payer: MEDICAID

## 2021-07-30 DIAGNOSIS — T50.901A POLYSUBSTANCE OVERDOSE, ACCIDENTAL OR UNINTENTIONAL, INITIAL ENCOUNTER: Primary | ICD-10-CM

## 2021-07-30 DIAGNOSIS — I48.91 NEW ONSET ATRIAL FIBRILLATION (HCC): ICD-10-CM

## 2021-07-30 LAB
ANION GAP SERPL CALC-SCNC: 8 MMOL/L (ref 3–18)
ATRIAL RATE: 170 BPM
BASOPHILS # BLD: 0 K/UL (ref 0–0.1)
BASOPHILS NFR BLD: 0 % (ref 0–2)
BUN SERPL-MCNC: 14 MG/DL (ref 7–18)
BUN/CREAT SERPL: 22 (ref 12–20)
CALCIUM SERPL-MCNC: 9 MG/DL (ref 8.5–10.1)
CALCULATED R AXIS, ECG10: 71 DEGREES
CALCULATED T AXIS, ECG11: 97 DEGREES
CHLORIDE SERPL-SCNC: 113 MMOL/L (ref 100–111)
CO2 SERPL-SCNC: 19 MMOL/L (ref 21–32)
CREAT SERPL-MCNC: 0.64 MG/DL (ref 0.6–1.3)
DIAGNOSIS, 93000: NORMAL
DIFFERENTIAL METHOD BLD: ABNORMAL
EOSINOPHIL # BLD: 0 K/UL (ref 0–0.4)
EOSINOPHIL NFR BLD: 0 % (ref 0–5)
ERYTHROCYTE [DISTWIDTH] IN BLOOD BY AUTOMATED COUNT: 12.5 % (ref 11.6–14.5)
GLUCOSE SERPL-MCNC: 119 MG/DL (ref 74–99)
HCT VFR BLD AUTO: 40.3 % (ref 35–45)
HGB BLD-MCNC: 13.5 G/DL (ref 12–16)
LYMPHOCYTES # BLD: 0.4 K/UL (ref 0.9–3.6)
LYMPHOCYTES NFR BLD: 5 % (ref 21–52)
MAGNESIUM SERPL-MCNC: 1.7 MG/DL (ref 1.6–2.6)
MCH RBC QN AUTO: 34.4 PG (ref 24–34)
MCHC RBC AUTO-ENTMCNC: 33.5 G/DL (ref 31–37)
MCV RBC AUTO: 102.8 FL (ref 74–97)
MONOCYTES # BLD: 0.2 K/UL (ref 0.05–1.2)
MONOCYTES NFR BLD: 2 % (ref 3–10)
NEUTS SEG # BLD: 8.2 K/UL (ref 1.8–8)
NEUTS SEG NFR BLD: 93 % (ref 40–73)
PLATELET # BLD AUTO: 175 K/UL (ref 135–420)
PMV BLD AUTO: 9.3 FL (ref 9.2–11.8)
POTASSIUM SERPL-SCNC: 3.6 MMOL/L (ref 3.5–5.5)
Q-T INTERVAL, ECG07: 382 MS
QRS DURATION, ECG06: 90 MS
QTC CALCULATION (BEZET), ECG08: 490 MS
RBC # BLD AUTO: 3.92 M/UL (ref 4.2–5.3)
SODIUM SERPL-SCNC: 140 MMOL/L (ref 136–145)
VENTRICULAR RATE, ECG03: 99 BPM
WBC # BLD AUTO: 8.8 K/UL (ref 4.6–13.2)

## 2021-07-30 PROCEDURE — 85025 COMPLETE CBC W/AUTO DIFF WBC: CPT

## 2021-07-30 PROCEDURE — APPSS60 APP SPLIT SHARED TIME 46-60 MINUTES: Performed by: NURSE PRACTITIONER

## 2021-07-30 PROCEDURE — 99284 EMERGENCY DEPT VISIT MOD MDM: CPT

## 2021-07-30 PROCEDURE — 80048 BASIC METABOLIC PNL TOTAL CA: CPT

## 2021-07-30 PROCEDURE — G0378 HOSPITAL OBSERVATION PER HR: HCPCS

## 2021-07-30 PROCEDURE — 83735 ASSAY OF MAGNESIUM: CPT

## 2021-07-30 PROCEDURE — 65270000029 HC RM PRIVATE

## 2021-07-30 PROCEDURE — 93005 ELECTROCARDIOGRAM TRACING: CPT

## 2021-07-30 PROCEDURE — 99222 1ST HOSP IP/OBS MODERATE 55: CPT | Performed by: STUDENT IN AN ORGANIZED HEALTH CARE EDUCATION/TRAINING PROGRAM

## 2021-07-30 RX ORDER — SODIUM CHLORIDE 0.9 % (FLUSH) 0.9 %
5-40 SYRINGE (ML) INJECTION EVERY 8 HOURS
Status: DISCONTINUED | OUTPATIENT
Start: 2021-07-30 | End: 2021-07-31 | Stop reason: HOSPADM

## 2021-07-30 RX ORDER — ONDANSETRON 4 MG/1
4 TABLET, ORALLY DISINTEGRATING ORAL
Status: DISCONTINUED | OUTPATIENT
Start: 2021-07-30 | End: 2021-07-31 | Stop reason: HOSPADM

## 2021-07-30 RX ORDER — ONDANSETRON 4 MG/1
8 TABLET, ORALLY DISINTEGRATING ORAL
Status: DISPENSED | OUTPATIENT
Start: 2021-07-30 | End: 2021-07-31

## 2021-07-30 RX ORDER — THERA TABS 400 MCG
1 TAB ORAL DAILY
Status: DISCONTINUED | OUTPATIENT
Start: 2021-07-31 | End: 2021-07-31 | Stop reason: HOSPADM

## 2021-07-30 RX ORDER — IBUPROFEN 600 MG/1
600 TABLET ORAL
Status: DISCONTINUED | OUTPATIENT
Start: 2021-07-30 | End: 2021-07-31 | Stop reason: HOSPADM

## 2021-07-30 RX ORDER — SODIUM CHLORIDE 0.9 % (FLUSH) 0.9 %
5-40 SYRINGE (ML) INJECTION AS NEEDED
Status: DISCONTINUED | OUTPATIENT
Start: 2021-07-30 | End: 2021-07-31 | Stop reason: HOSPADM

## 2021-07-30 RX ORDER — ENOXAPARIN SODIUM 100 MG/ML
1 INJECTION SUBCUTANEOUS EVERY 12 HOURS
Status: DISCONTINUED | OUTPATIENT
Start: 2021-07-30 | End: 2021-07-31 | Stop reason: HOSPADM

## 2021-07-30 RX ORDER — LORAZEPAM 1 MG/1
1 TABLET ORAL
Status: DISCONTINUED | OUTPATIENT
Start: 2021-07-30 | End: 2021-07-31 | Stop reason: HOSPADM

## 2021-07-30 RX ORDER — ACETAMINOPHEN 650 MG/1
650 SUPPOSITORY RECTAL
Status: DISCONTINUED | OUTPATIENT
Start: 2021-07-30 | End: 2021-07-31 | Stop reason: HOSPADM

## 2021-07-30 RX ORDER — LORAZEPAM 2 MG/ML
1 INJECTION INTRAMUSCULAR
Status: DISCONTINUED | OUTPATIENT
Start: 2021-07-30 | End: 2021-07-31 | Stop reason: HOSPADM

## 2021-07-30 RX ORDER — LORAZEPAM 2 MG/ML
3 INJECTION INTRAMUSCULAR
Status: DISCONTINUED | OUTPATIENT
Start: 2021-07-30 | End: 2021-07-31 | Stop reason: HOSPADM

## 2021-07-30 RX ORDER — POLYETHYLENE GLYCOL 3350 17 G/17G
17 POWDER, FOR SOLUTION ORAL DAILY PRN
Status: DISCONTINUED | OUTPATIENT
Start: 2021-07-30 | End: 2021-07-31 | Stop reason: HOSPADM

## 2021-07-30 RX ORDER — LORAZEPAM 1 MG/1
2 TABLET ORAL
Status: DISCONTINUED | OUTPATIENT
Start: 2021-07-30 | End: 2021-07-31 | Stop reason: HOSPADM

## 2021-07-30 RX ORDER — LORAZEPAM 2 MG/ML
2 INJECTION INTRAMUSCULAR
Status: DISCONTINUED | OUTPATIENT
Start: 2021-07-30 | End: 2021-07-31 | Stop reason: HOSPADM

## 2021-07-30 RX ORDER — ACETAMINOPHEN 325 MG/1
650 TABLET ORAL
Status: DISCONTINUED | OUTPATIENT
Start: 2021-07-30 | End: 2021-07-31 | Stop reason: HOSPADM

## 2021-07-30 RX ORDER — FOLIC ACID 1 MG/1
1 TABLET ORAL DAILY
Status: DISCONTINUED | OUTPATIENT
Start: 2021-07-31 | End: 2021-07-31 | Stop reason: HOSPADM

## 2021-07-30 RX ORDER — ONDANSETRON 2 MG/ML
4 INJECTION INTRAMUSCULAR; INTRAVENOUS
Status: DISCONTINUED | OUTPATIENT
Start: 2021-07-30 | End: 2021-07-31 | Stop reason: HOSPADM

## 2021-07-30 RX ORDER — SODIUM CHLORIDE 9 MG/ML
100 INJECTION, SOLUTION INTRAVENOUS CONTINUOUS
Status: DISCONTINUED | OUTPATIENT
Start: 2021-07-30 | End: 2021-07-31 | Stop reason: HOSPADM

## 2021-07-30 RX ORDER — LORAZEPAM 2 MG/ML
1 INJECTION INTRAMUSCULAR
Status: ACTIVE | OUTPATIENT
Start: 2021-07-30 | End: 2021-07-31

## 2021-07-30 RX ORDER — LORAZEPAM 1 MG/1
1 TABLET ORAL
Status: ACTIVE | OUTPATIENT
Start: 2021-07-30 | End: 2021-07-31

## 2021-07-30 RX ORDER — LANOLIN ALCOHOL/MO/W.PET/CERES
100 CREAM (GRAM) TOPICAL DAILY
Status: DISCONTINUED | OUTPATIENT
Start: 2021-07-31 | End: 2021-07-31 | Stop reason: HOSPADM

## 2021-07-30 RX ORDER — FAMOTIDINE 20 MG/1
20 TABLET, FILM COATED ORAL 2 TIMES DAILY
Status: DISCONTINUED | OUTPATIENT
Start: 2021-07-31 | End: 2021-07-31 | Stop reason: HOSPADM

## 2021-07-30 NOTE — ED PROVIDER NOTES
EMERGENCY DEPARTMENT HISTORY AND PHYSICAL EXAM    2:56 PM patient seen at this time in [facility]      Date: 7/30/2021  Patient Name: Emmanuel Dunaway    History of Presenting Illness     Chief Complaint   Patient presents with    Drug Overdose         History Provided By: patient    Additional History (Context): Emmanuel Dunaway is a 60 y.o. female presents with presented to EMS as apneic, bag-valve-mask ventilations to assist given 1 mg of Narcan and returned to her normal mental status alert and oriented speaking protecting her airway and ventilating normally. Patient admits to use of alcohol and heroin. History only of high blood pressure. Denies sympathomimetic use no pain at the time of my exam but is nauseous. PCP: Marlon Wong MD    Chief Complaint:   Duration:    Timing:    Location:   Quality:   Severity:   Modifying Factors:   Associated Symptoms:       Current Facility-Administered Medications   Medication Dose Route Frequency Provider Last Rate Last Admin    ondansetron (ZOFRAN ODT) tablet 8 mg  8 mg Oral NOW Karely Grajeda MD        LORazepam (ATIVAN) tablet 1 mg  1 mg Oral NOW Karely Grajeda MD        LORazepam (ATIVAN) injection 1 mg  1 mg IntraVENous NOW Thu Hardy MD         Current Outpatient Medications   Medication Sig Dispense Refill    ibuprofen (MOTRIN) 600 mg tablet Take 1 Tab by mouth every eight (8) hours as needed for Pain. 20 Tab 0    azithromycin (ZITHROMAX Z-SELVIN) 250 mg tablet Take two tablets the first day and then one every day thereafter until completion 6 Tab 0    aspirin delayed-release 81 mg tablet Take 1 Tab by mouth daily. 30 Tab 0    atorvastatin (LIPITOR) 20 mg tablet Take 1 Tab by mouth nightly. 30 Tab 0    folic acid (FOLVITE) 1 mg tablet Take 1 Tab by mouth daily. 30 Tab 0    therapeutic multivitamin (THERAGRAN) tablet Take 1 Tab by mouth daily. 30 Tab 0    thiamine (B-1) 100 mg tablet Take 1 Tab by mouth daily.  30 Tab 0    amLODIPine (NORVASC) 5 mg tablet Take 0.5 Tabs by mouth daily. Indications: hypertension 30 Tab 0    docusate sodium (COLACE) 100 mg capsule Take 1 capsule twice daily 60 Cap 0    promethazine (PHENERGAN) 25 mg tablet Take 1 Tab by mouth every six (6) hours as needed. Indications: Nausea/Vomiting 12 Tab 0       Past History     Past Medical History:  Past Medical History:   Diagnosis Date    Hypertension        Past Surgical History:  No past surgical history on file. Family History:  No family history on file. Social History:  Social History     Tobacco Use    Smoking status: Never Smoker    Smokeless tobacco: Never Used   Substance Use Topics    Alcohol use: Yes     Comment: occational beer    Drug use: No       Allergies:  No Known Allergies      Review of Systems     Review of Systems   Constitutional: Negative for diaphoresis and fever. HENT: Negative for congestion and sore throat. Eyes: Negative for pain and itching. Respiratory: Negative for cough and shortness of breath. Cardiovascular: Negative for chest pain and palpitations. Gastrointestinal: Positive for vomiting. Negative for abdominal pain and diarrhea. Endocrine: Negative for polydipsia and polyuria. Genitourinary: Negative for dysuria and hematuria. Musculoskeletal: Negative for arthralgias and myalgias. Skin: Negative for rash and wound. Neurological: Negative for seizures and syncope. Hematological: Does not bruise/bleed easily. Psychiatric/Behavioral: Negative for agitation and hallucinations. Physical Exam       Patient Vitals for the past 12 hrs:   Pulse Resp BP SpO2   07/30/21 1853 86 20 (!) 134/97 100 %       Physical Exam  Vitals and nursing note reviewed. Constitutional:       Appearance: She is well-developed. She is not ill-appearing. HENT:      Head: Normocephalic and atraumatic. Eyes:      General: No scleral icterus.      Conjunctiva/sclera: Conjunctivae normal.   Neck:      Vascular: No JVD. Cardiovascular:      Rate and Rhythm: Normal rate and regular rhythm. Heart sounds: Normal heart sounds. Comments: 4 intact extremity pulses  Pulmonary:      Effort: Pulmonary effort is normal.      Breath sounds: Normal breath sounds. Abdominal:      Palpations: Abdomen is soft. There is no mass. Tenderness: There is no abdominal tenderness. Musculoskeletal:         General: Normal range of motion. Cervical back: Normal range of motion and neck supple. Lymphadenopathy:      Cervical: No cervical adenopathy. Skin:     General: Skin is warm and dry. Neurological:      General: No focal deficit present. Mental Status: She is alert. Cranial Nerves: No cranial nerve deficit. Comments: Patient vomited moderate amount of foodstuffs. She is protecting clearing her airway.            Diagnostic Study Results   Labs -  Recent Results (from the past 12 hour(s))   EKG, 12 LEAD, INITIAL    Collection Time: 07/30/21  7:00 PM   Result Value Ref Range    Ventricular Rate 99 BPM    Atrial Rate 170 BPM    QRS Duration 90 ms    Q-T Interval 382 ms    QTC Calculation (Bezet) 490 ms    Calculated R Axis 71 degrees    Calculated T Axis 97 degrees    Diagnosis       Poor data quality, interpretation may be adversely affected  Atrial fibrillation  Voltage criteria for left ventricular hypertrophy  Cannot rule out Septal infarct (cited on or before 25-OCT-2017)  Abnormal ECG  When compared with ECG of 31-OCT-2020 18:03,  Atrial fibrillation has replaced Sinus rhythm  Nonspecific T wave abnormality, worse in Lateral leads  Confirmed by Andres Vázquez ([id number]) on 7/30/2021 8:26:17 PM     CBC WITH AUTOMATED DIFF    Collection Time: 07/30/21  7:17 PM   Result Value Ref Range    WBC 8.8 4.6 - 13.2 K/uL    RBC 3.92 (L) 4.20 - 5.30 M/uL    HGB 13.5 12.0 - 16.0 g/dL    HCT 40.3 35.0 - 45.0 %    .8 (H) 74.0 - 97.0 FL    MCH 34.4 (H) 24.0 - 34.0 PG    MCHC 33.5 31.0 - 37.0 g/dL    RDW 12.5 11.6 - 14.5 %    PLATELET 934 041 - 031 K/uL    MPV 9.3 9.2 - 11.8 FL    NEUTROPHILS 93 (H) 40 - 73 %    LYMPHOCYTES 5 (L) 21 - 52 %    MONOCYTES 2 (L) 3 - 10 %    EOSINOPHILS 0 0 - 5 %    BASOPHILS 0 0 - 2 %    ABS. NEUTROPHILS 8.2 (H) 1.8 - 8.0 K/UL    ABS. LYMPHOCYTES 0.4 (L) 0.9 - 3.6 K/UL    ABS. MONOCYTES 0.2 0.05 - 1.2 K/UL    ABS. EOSINOPHILS 0.0 0.0 - 0.4 K/UL    ABS. BASOPHILS 0.0 0.0 - 0.1 K/UL    DF AUTOMATED     METABOLIC PANEL, BASIC    Collection Time: 07/30/21  7:17 PM   Result Value Ref Range    Sodium 140 136 - 145 mmol/L    Potassium 3.6 3.5 - 5.5 mmol/L    Chloride 113 (H) 100 - 111 mmol/L    CO2 19 (L) 21 - 32 mmol/L    Anion gap 8 3.0 - 18 mmol/L    Glucose 119 (H) 74 - 99 mg/dL    BUN 14 7.0 - 18 MG/DL    Creatinine 0.64 0.6 - 1.3 MG/DL    BUN/Creatinine ratio 22 (H) 12 - 20      GFR est AA >60 >60 ml/min/1.73m2    GFR est non-AA >60 >60 ml/min/1.73m2    Calcium 9.0 8.5 - 10.1 MG/DL   MAGNESIUM    Collection Time: 07/30/21  7:17 PM   Result Value Ref Range    Magnesium 1.7 1.6 - 2.6 mg/dL       Radiologic Studies -   No orders to display     No results found. Medications ordered:   Medications   ondansetron (ZOFRAN ODT) tablet 8 mg (has no administration in time range)   LORazepam (ATIVAN) tablet 1 mg (has no administration in time range)   LORazepam (ATIVAN) injection 1 mg (has no administration in time range)         Medical Decision Making   Initial Medical Decision Making and DDx:  She is asking for someone to call her mother. Neurologic status is entirely intact nonfocal and she is speaking in complete sentences with appropriate language. Will monitor her for lasting effects of narcotic overdose combined with alcohol. At this point I do not suspect intracranial lesion, bleed, concomitant metabolic abnormality contributing.     ED Course: Progress Notes, Reevaluation, and Consults:  ED Course as of Jul 30 2107 Fri Jul 30, 2021   1500 No much response to 2 mg of Ativan and still moving about erratically. Will convert to soft restraints to get the handcuffs offer. Please also note injury to right third or fourth finger from digging with her thumb there is an abrasion here. 10 mg of Valium ordered    [CB]   1501 Rectal Tylenol ordered    [CB]   1854 Tech noted an irregular pulse rate and this corresponds with the monitor, wavy baseline consider A. fib. Questioning the patient she says no history of this. [CB]      ED Course User Index  [CB] Thu Hardy MD     9:08 PM discussed with Dr. Angelika Hartman hospitalist and placed PA [facility] on the treatment team, patient will be admitted for new onset atrial fibrillation reviewed labs nothing actionable continue to have vomiting from her drug use. Ativan and fluids ordered    I am the first provider for this patient. I reviewed the vital signs, available nursing notes, past medical history, past surgical history, family history and social history. Patient Vitals for the past 12 hrs:   Pulse Resp BP SpO2   07/30/21 1853 86 20 (!) 134/97 100 %       Vital Signs-Reviewed the patient's vital signs. Pulse Oximetry Analysis, Cardiac Monitor, 12 lead ekg:       Interpreted by the EP. Records Reviewed: Nursing notes reviewed (Time of Review: 2:56 PM)    Procedures:   Critical Care Time:   Aspirin: (was aspirin given for stroke?)    Diagnosis     Clinical Impression:   1. Polysubstance overdose, accidental or unintentional, initial encounter    2. New onset atrial fibrillation [facility])        Disposition: Admitted      Follow-up Information     Follow up With Specialties Details Why Contact Info    Marlon Wong MD Internal Medicine In 3 days  [facility] [phone]             Patient's Medications   Start Taking    No medications on file   Continue Taking    AMLODIPINE (NORVASC) 5 MG TABLET    Take 0.5 Tabs by mouth daily.  Indications: hypertension    ASPIRIN DELAYED-RELEASE 81 MG TABLET Take 1 Tab by mouth daily. ATORVASTATIN (LIPITOR) 20 MG TABLET    Take 1 Tab by mouth nightly. AZITHROMYCIN (ZITHROMAX Z-SELVIN) 250 MG TABLET    Take two tablets the first day and then one every day thereafter until completion    DOCUSATE SODIUM (COLACE) 100 MG CAPSULE    Take 1 capsule twice daily    FOLIC ACID (FOLVITE) 1 MG TABLET    Take 1 Tab by mouth daily. IBUPROFEN (MOTRIN) 600 MG TABLET    Take 1 Tab by mouth every eight (8) hours as needed for Pain. PROMETHAZINE (PHENERGAN) 25 MG TABLET    Take 1 Tab by mouth every six (6) hours as needed. Indications: Nausea/Vomiting    THERAPEUTIC MULTIVITAMIN (THERAGRAN) TABLET    Take 1 Tab by mouth daily. THIAMINE (B-1) 100 MG TABLET    Take 1 Tab by mouth daily.    These Medications have changed    No medications on file   Stop Taking    No medications on file     _______________________________    Notes:    James Rios MD using Dragon dictation      _______________________________

## 2021-07-31 ENCOUNTER — APPOINTMENT (OUTPATIENT)
Dept: NON INVASIVE DIAGNOSTICS | Age: 56
DRG: 201 | End: 2021-07-31
Attending: PHYSICIAN ASSISTANT
Payer: MEDICAID

## 2021-07-31 ENCOUNTER — APPOINTMENT (OUTPATIENT)
Dept: GENERAL RADIOLOGY | Age: 56
DRG: 201 | End: 2021-07-31
Attending: PHYSICIAN ASSISTANT
Payer: MEDICAID

## 2021-07-31 VITALS
RESPIRATION RATE: 16 BRPM | HEART RATE: 61 BPM | SYSTOLIC BLOOD PRESSURE: 166 MMHG | WEIGHT: 113 LBS | TEMPERATURE: 97.8 F | HEIGHT: 64 IN | BODY MASS INDEX: 19.29 KG/M2 | OXYGEN SATURATION: 100 % | DIASTOLIC BLOOD PRESSURE: 91 MMHG

## 2021-07-31 LAB
ALBUMIN SERPL-MCNC: 3.4 G/DL (ref 3.4–5)
ALBUMIN/GLOB SERPL: 0.9 {RATIO} (ref 0.8–1.7)
ALP SERPL-CCNC: 50 U/L (ref 45–117)
ALT SERPL-CCNC: 18 U/L (ref 13–56)
AMPHET UR QL SCN: NEGATIVE
ANION GAP SERPL CALC-SCNC: 4 MMOL/L (ref 3–18)
APPEARANCE UR: ABNORMAL
AST SERPL-CCNC: 14 U/L (ref 10–38)
BACTERIA URNS QL MICRO: ABNORMAL /HPF
BARBITURATES UR QL SCN: NEGATIVE
BASOPHILS # BLD: 0 K/UL (ref 0–0.1)
BASOPHILS NFR BLD: 0 % (ref 0–2)
BENZODIAZ UR QL: NEGATIVE
BILIRUB SERPL-MCNC: 0.5 MG/DL (ref 0.2–1)
BILIRUB UR QL: NEGATIVE
BUN SERPL-MCNC: 13 MG/DL (ref 7–18)
BUN/CREAT SERPL: 25 (ref 12–20)
CALCIUM SERPL-MCNC: 8.7 MG/DL (ref 8.5–10.1)
CANNABINOIDS UR QL SCN: NEGATIVE
CHLORIDE SERPL-SCNC: 108 MMOL/L (ref 100–111)
CO2 SERPL-SCNC: 28 MMOL/L (ref 21–32)
COCAINE UR QL SCN: POSITIVE
COLOR UR: YELLOW
CREAT SERPL-MCNC: 0.52 MG/DL (ref 0.6–1.3)
DIFFERENTIAL METHOD BLD: ABNORMAL
ECHO AO ROOT DIAM: 3.94 CM
ECHO LA AREA 4C: 14.13 CM2
ECHO LA VOL 2C: 50.28 ML (ref 22–52)
ECHO LA VOL 4C: 30.33 ML (ref 22–52)
ECHO LA VOL BP: 45.19 ML (ref 22–52)
ECHO LA VOL/BSA BIPLANE: 29.54 ML/M2 (ref 16–28)
ECHO LA VOLUME INDEX A2C: 32.86 ML/M2 (ref 16–28)
ECHO LA VOLUME INDEX A4C: 19.82 ML/M2 (ref 16–28)
ECHO LV E' LATERAL VELOCITY: 7.23 CM/S
ECHO LV E' SEPTAL VELOCITY: 8.62 CM/S
ECHO LV INTERNAL DIMENSION DIASTOLIC: 4.6 CM (ref 3.9–5.3)
ECHO LV INTERNAL DIMENSION SYSTOLIC: 2.74 CM
ECHO LV IVSD: 1 CM (ref 0.6–0.9)
ECHO LV MASS 2D: 145 G (ref 67–162)
ECHO LV MASS INDEX 2D: 94.7 G/M2 (ref 43–95)
ECHO LV POSTERIOR WALL DIASTOLIC: 0.87 CM (ref 0.6–0.9)
ECHO LVOT DIAM: 1.99 CM
ECHO MV A VELOCITY: 40.72 CM/S
ECHO MV E DECELERATION TIME (DT): 172.97 MS
ECHO MV E VELOCITY: 71.65 CM/S
ECHO MV E/A RATIO: 1.76
ECHO MV E/E' LATERAL: 9.91
ECHO MV E/E' RATIO (AVERAGED): 9.11
ECHO MV E/E' SEPTAL: 8.31
ECHO PVEIN A DURATION: 133.73 MS
ECHO PVEIN A VELOCITY: 22.51 CM/S
ECHO RV TAPSE: 1.81 CM (ref 1.5–2)
ECHO TV REGURGITANT MAX VELOCITY: 225.12 CM/S
ECHO TV REGURGITANT PEAK GRADIENT: 20.27 MMHG
EOSINOPHIL # BLD: 0 K/UL (ref 0–0.4)
EOSINOPHIL NFR BLD: 1 % (ref 0–5)
EPITH CASTS URNS QL MICRO: ABNORMAL /LPF (ref 0–5)
ERYTHROCYTE [DISTWIDTH] IN BLOOD BY AUTOMATED COUNT: 12.5 % (ref 11.6–14.5)
GLOBULIN SER CALC-MCNC: 3.6 G/DL (ref 2–4)
GLUCOSE SERPL-MCNC: 79 MG/DL (ref 74–99)
GLUCOSE UR STRIP.AUTO-MCNC: NEGATIVE MG/DL
HCT VFR BLD AUTO: 34.3 % (ref 35–45)
HDSCOM,HDSCOM: ABNORMAL
HGB BLD-MCNC: 11.6 G/DL (ref 12–16)
HGB UR QL STRIP: NEGATIVE
KETONES UR QL STRIP.AUTO: NEGATIVE MG/DL
LEUKOCYTE ESTERASE UR QL STRIP.AUTO: ABNORMAL
LYMPHOCYTES # BLD: 1.5 K/UL (ref 0.9–3.6)
LYMPHOCYTES NFR BLD: 41 % (ref 21–52)
MCH RBC QN AUTO: 35.7 PG (ref 24–34)
MCHC RBC AUTO-ENTMCNC: 33.8 G/DL (ref 31–37)
MCV RBC AUTO: 105.5 FL (ref 74–97)
METHADONE UR QL: NEGATIVE
MONOCYTES # BLD: 0.2 K/UL (ref 0.05–1.2)
MONOCYTES NFR BLD: 7 % (ref 3–10)
NEUTS SEG # BLD: 1.9 K/UL (ref 1.8–8)
NEUTS SEG NFR BLD: 52 % (ref 40–73)
NITRITE UR QL STRIP.AUTO: NEGATIVE
OPIATES UR QL: NEGATIVE
PCP UR QL: NEGATIVE
PH UR STRIP: 6 [PH] (ref 5–8)
PLATELET # BLD AUTO: 167 K/UL (ref 135–420)
PMV BLD AUTO: 9.6 FL (ref 9.2–11.8)
POTASSIUM SERPL-SCNC: 3.6 MMOL/L (ref 3.5–5.5)
PROT SERPL-MCNC: 7 G/DL (ref 6.4–8.2)
PROT UR STRIP-MCNC: 30 MG/DL
RBC # BLD AUTO: 3.25 M/UL (ref 4.2–5.3)
RBC #/AREA URNS HPF: NEGATIVE /HPF (ref 0–5)
SODIUM SERPL-SCNC: 140 MMOL/L (ref 136–145)
SP GR UR REFRACTOMETRY: 1.02 (ref 1–1.03)
TSH SERPL DL<=0.05 MIU/L-ACNC: 0.5 UIU/ML (ref 0.36–3.74)
UROBILINOGEN UR QL STRIP.AUTO: 1 EU/DL (ref 0.2–1)
WBC # BLD AUTO: 3.6 K/UL (ref 4.6–13.2)
WBC URNS QL MICRO: ABNORMAL /HPF (ref 0–4)

## 2021-07-31 PROCEDURE — 84443 ASSAY THYROID STIM HORMONE: CPT

## 2021-07-31 PROCEDURE — 99239 HOSP IP/OBS DSCHRG MGMT >30: CPT | Performed by: INTERNAL MEDICINE

## 2021-07-31 PROCEDURE — 36415 COLL VENOUS BLD VENIPUNCTURE: CPT

## 2021-07-31 PROCEDURE — 81001 URINALYSIS AUTO W/SCOPE: CPT

## 2021-07-31 PROCEDURE — 74011250636 HC RX REV CODE- 250/636: Performed by: NURSE PRACTITIONER

## 2021-07-31 PROCEDURE — G0378 HOSPITAL OBSERVATION PER HR: HCPCS

## 2021-07-31 PROCEDURE — 74011250637 HC RX REV CODE- 250/637: Performed by: PHYSICIAN ASSISTANT

## 2021-07-31 PROCEDURE — 80053 COMPREHEN METABOLIC PANEL: CPT

## 2021-07-31 PROCEDURE — 85025 COMPLETE CBC W/AUTO DIFF WBC: CPT

## 2021-07-31 PROCEDURE — 74011250637 HC RX REV CODE- 250/637: Performed by: NURSE PRACTITIONER

## 2021-07-31 PROCEDURE — 99254 IP/OBS CNSLTJ NEW/EST MOD 60: CPT | Performed by: INTERNAL MEDICINE

## 2021-07-31 PROCEDURE — 80307 DRUG TEST PRSMV CHEM ANLYZR: CPT

## 2021-07-31 PROCEDURE — 2709999900 HC NON-CHARGEABLE SUPPLY

## 2021-07-31 PROCEDURE — 93306 TTE W/DOPPLER COMPLETE: CPT

## 2021-07-31 PROCEDURE — 71046 X-RAY EXAM CHEST 2 VIEWS: CPT

## 2021-07-31 RX ORDER — NITROFURANTOIN 25; 75 MG/1; MG/1
100 CAPSULE ORAL 2 TIMES DAILY
Qty: 10 CAPSULE | Refills: 0 | Status: SHIPPED | OUTPATIENT
Start: 2021-07-31 | End: 2021-08-05

## 2021-07-31 RX ORDER — ASPIRIN 81 MG/1
81 TABLET ORAL DAILY
Status: DISCONTINUED | OUTPATIENT
Start: 2021-07-31 | End: 2021-07-31 | Stop reason: HOSPADM

## 2021-07-31 RX ADMIN — Medication 10 ML: at 05:50

## 2021-07-31 RX ADMIN — ENOXAPARIN SODIUM 50 MG: 60 INJECTION SUBCUTANEOUS at 01:36

## 2021-07-31 RX ADMIN — FOLIC ACID 1 MG: 1 TABLET ORAL at 08:29

## 2021-07-31 RX ADMIN — FAMOTIDINE 20 MG: 20 TABLET ORAL at 08:29

## 2021-07-31 RX ADMIN — ASPIRIN 81 MG: 81 TABLET, COATED ORAL at 13:06

## 2021-07-31 RX ADMIN — Medication 10 ML: at 01:44

## 2021-07-31 RX ADMIN — Medication 100 MG: at 08:29

## 2021-07-31 RX ADMIN — Medication 10 ML: at 01:34

## 2021-07-31 RX ADMIN — SODIUM CHLORIDE 100 ML/HR: 900 INJECTION, SOLUTION INTRAVENOUS at 04:44

## 2021-07-31 RX ADMIN — ONDANSETRON 4 MG: 2 INJECTION INTRAMUSCULAR; INTRAVENOUS at 01:33

## 2021-07-31 RX ADMIN — THERA TABS 1 TABLET: TAB at 08:29

## 2021-07-31 RX ADMIN — ENOXAPARIN SODIUM 50 MG: 60 INJECTION SUBCUTANEOUS at 08:37

## 2021-07-31 NOTE — PROGRESS NOTES
D/C order noted for today. Patient will call for a ride home today at time of discharge. Orders reviewed. No needs identified at this time. CM remains available if needed.        Anival Campos, [phone]

## 2021-07-31 NOTE — ED NOTES
Report included the following information SBAR, Kardex, MAR and Recent Results. SITUATION:    Code Status: Full Code   Reason for Admission: New onset atrial fibrillation ([address] [I48.91]    [facility] day: 1   Problem List:       Hospital Problems  Date Reviewed: 11/22/2019        Codes Class Noted POA    New onset atrial fibrillation [facility]) ICD-10-CM: I48.91  ICD-9-CM: 427.31  7/30/2021 Unknown              BACKGROUND:    Past Medical History:   Past Medical History:   Diagnosis Date    Hypertension          Patient taking anticoagulants no     ASSESSMENT:    Changes in Assessment Throughout Shift:      Patient has Central Line: no Reasons if yes:    Patient has Butler Cath: no Reasons if yes:       Last Vitals:     Vitals:    07/30/21 2215 07/30/21 2233 07/30/21 2308 07/30/21 2345   BP:   (!) 161/99 (!) 132/97   Pulse: 74  88 89   Resp: 20  15 16   Temp:   98.1 °F (36.7 °C) 98.2 °F (36.8 °C)   SpO2:   100% 100%   Weight:  54 kg (119 lb 0.8 oz)          IV and DRAINS (will only show if present)   Peripheral IV 07/30/21 Right Forearm-Site Assessment: Clean, dry, & intact     WOUND (if present)   Wound Type:  none,    Dressing present     Wound Concerns/Notes:  none     PAIN    Pain Assessment                      o Interventions for Pain:  none  o Intervention effective: no  o Time of last intervention:    o Reassessment Completed: no      Last 3 Weights:  Last 3 Recorded Weights in this Encounter    07/30/21 2233   Weight: 54 kg (119 lb 0.8 oz)     Weight change:      INTAKE/OUPUT    Current Shift: No intake/output data recorded. Last three shifts: No intake/output data recorded.  LAB RESULTS     Recent Labs     07/30/21 1917   WBC 8.8   HGB 13.5   HCT 40.3           Recent Labs     07/30/21 1917      K 3.6   *   BUN 14   CREA 0.64   CA 9.0   MG 1.7       RECOMMENDATIONS AND DISCHARGE PLANNING     1. Pending tests/procedures/ Plan of Care or Other Needs:      2.  Discharge plan for patient and Needs/Barriers:     3. Estimated Discharge Date:  Posted on Whiteboard in Patients Room:      4. The patient's care plan was reviewed with the oncoming nurse. \"HEALS\" SAFETY CHECK      Fall Risk         Safety Measures:      A safety check occurred in the patient's room between off going nurse and oncoming nurse listed above. The safety check included the below items  Area Items   H  High Alert Medications - Verify all high alert medication drips (heparin, PCA, etc.)   E  Equipment - Suction is set up for ALL patients (with jannet)  - Red plugs utilized for all equipment (IV pumps, etc.)  - WOWs wiped down at end of shift.  - Room stocked with oxygen, suction, and other unit-specific supplies   A  Alarms - Bed alarm is set for fall risk patients  - Ensure chair alarm is in place and activated if patient is up in a chair   L  Lines - Check IV for any infiltration  - Butler bag is empty if patient has a Butler   - Tubing and IV bags are labeled   S  Safety   - Room is clean, patient is clean, and equipment is clean. - Hallways are clear from equipment besides carts. - Fall bracelet on for fall risk patients  - Ensure room is clear and free of clutter  - Suction is set up for ALL patients (with jannet)  - Hallways are clear from equipment besides carts.    - Isolation precautions followed, supplies available outside room, sign posted     Missy Stevens

## 2021-07-31 NOTE — H&P
History & Physical    Patient: Daysi Malik MRN: [id number]  CSN: [id number]    YOB: 1965  Age: 60 y.o. Sex: female      DOA: 7/30/2021  CC: afib with RVR    PCP: Lisa Vargas MD       HPI:     Daysi Malik is a 60 y.o. female who presented to ER via EMS for overdose. Found apneic, bag-valve mask and given narcan. She returned to her normal state. Admits to drinking [employer] [place] and doing drugs, heroine and cocaine. Nauseated throughout the ER visit. Change in her heart rhythm from SR to Afib with RVR with need for admission. Denies palpitations, chest pain or chest discomfort, SOB, abdominal pain. Symptoms of nausea and vomiting. Home medications of BP medications. ROS  GENERAL: no weight loss, no falls. HEENT: No change in vision, no earache, no tinnitus, no sore throat or sinus congestion. NECK: No pain or stiffness. PULMONARY: No shortness of breath, no cough or wheeze. Cardiovascular: no pnd or orthopnea, no CP  GASTROINTESTINAL: N/V   GENITOURINARY: No urinary frequency, no urgency, no hesitancy or dysuria. MUSCULOSKELETAL: No joint or muscle pain, no back pain, no recent trauma. DERMATOLOGIC: No rash, no itching, no lesions. ENDOCRINE: No polyuria, no polydipsia, no heat or cold intolerance. No recent change in weight. HEMATOLOGICAL: No anemia or easy bruising or bleeding. NEUROLOGIC: No headache, no seizures, no numbness, no tingling or weakness. Past Medical History:   Diagnosis Date    Hypertension        No past surgical history on file. No family history on file.     Social History     Socioeconomic History    Marital status: SINGLE     Spouse name: Not on file    Number of children: Not on file    Years of education: Not on file    Highest education level: Not on file   Tobacco Use    Smoking status: Never Smoker    Smokeless tobacco: Never Used   Substance and Sexual Activity    Alcohol use: Yes     Comment: occational beer    Drug use: No     Social Determinants of Health     Financial Resource Strain:     Difficulty of Paying Living Expenses:    Food Insecurity:     Worried About Running Out of Food in the Last Year:     [address] in the Last Year:    Transportation Needs:     Lack of Transportation (Medical):  Lack of Transportation (Non-Medical):    Physical Activity:     Days of Exercise per Week:     Minutes of Exercise per Session:    Stress:     Feeling of Stress :    Social Connections:     Frequency of Communication with Friends and Family:     Frequency of Social Gatherings with Friends and Family:     Attends [employer] Services:     Active Member of Clubs or Organizations:     Attends Club or Organization Meetings:     Marital Status:        Prior to Admission medications    Medication Sig Start Date End Date Taking? Authorizing Provider   ibuprofen (MOTRIN) 600 mg tablet Take 1 Tab by mouth every eight (8) hours as needed for Pain. 7/2/19   Charleen Vela PA-C   azithromycin (ZITHROMAX Z-SELVIN) 250 mg tablet Take two tablets the first day and then one every day thereafter until completion 12/21/17   Amaya Navarrete PA-C   aspirin delayed-release 81 mg tablet Take 1 Tab by mouth daily. 10/27/17   Pedro Combs NP   atorvastatin (LIPITOR) 20 mg tablet Take 1 Tab by mouth nightly. 10/26/17   Pedro Combs NP   folic acid (FOLVITE) 1 mg tablet Take 1 Tab by mouth daily. 10/27/17   Pedro Combs NP   therapeutic multivitamin [facility]) tablet Take 1 Tab by mouth daily. 10/27/17   Pedro Combs NP   thiamine (B-1) 100 mg tablet Take 1 Tab by mouth daily. 10/27/17   Pedro Cmobs NP   amLODIPine (NORVASC) 5 mg tablet Take 0.5 Tabs by mouth daily.  Indications: hypertension 10/27/17   Malorie MEJIA NP   docusate sodium (COLACE) 100 mg capsule Take 1 capsule twice daily 10/26/17   Malorie MEJIA NP   promethazine (PHENERGAN) 25 mg tablet Take 1 Tab by mouth every six (6) hours as needed. Indications: Nausea/Vomiting 12/21/16   ARCHIE Vera       No Known Allergies           Physical Exam:      Visit Vitals  BP (!) 134/97 (BP 1 Location: Right upper arm, BP Patient Position: Sitting)   Pulse 86   Resp 20   Wt 54 kg (119 lb 0.8 oz)   SpO2 100%   BMI 20.43 kg/m²       Physical Exam:  General:         Alert, cooperative, no acute distress    HEENT:          NC, Atraumatic. PERRLA, anicteric sclerae. Lungs:            CTA bilaterally. No Wheezing/Rhonchi/Rales  Heart:              Irregularly irregular, regular rhythm, No murmur, No Rubs, No Gallops  Abdomen:      Soft, Non distended, Non tender. +Bowel sounds, no HSM  Extremities:   No c/c/e  Psych:             Good insight. Not anxious or agitated. Neurologic:     Alert and oriented X 4. No acute neurological deficits    Lab/Data Review:  Labs: Results:       Chemistry Recent Labs     07/30/21 1917   *      K 3.6   *   CO2 19*   BUN 14   CREA 0.64   CA 9.0   AGAP 8   BUCR 22*      CBC w/Diff Recent Labs     07/30/21 1917   WBC 8.8   RBC 3.92*   HGB 13.5   HCT 40.3      GRANS 93*   LYMPH 5*   EOS 0      Coagulation No results for input(s): PTP, INR, APTT, INREXT in the last 72 hours. Iron/Ferritin No results for input(s): IRON in the last 72 hours. No lab exists for component: TIBCCALC   BNP No results for input(s): BNPP in the last 72 hours. Cardiac Enzymes No results for input(s): CPK, CKND1, TIFFANI in the last 72 hours. No lab exists for component: CKRMB, TROIP   Liver Enzymes No results for input(s): TP, ALB, TBIL, AP in the last 72 hours.     No lab exists for component: SGOT, GPT, DBIL   Thyroid Studies No results found for: T4, T3U, TSH, TSHEXT       All Micro Results     None          Imaging Reviewed:  EKG RESULTS     Procedure Component Value Units Date/Time    EKG, 12 LEAD, INITIAL [[id number]] Collected: 07/30/21 1900    Order Status: Completed Updated: 07/30/21 2026     Ventricular Rate 99 BPM      Atrial Rate 170 BPM      QRS Duration 90 ms      Q-T Interval 382 ms      QTC Calculation (Bezet) 490 ms      Calculated R Axis 71 degrees      Calculated T Axis 97 degrees      Diagnosis --     Poor data quality, interpretation may be adversely affected  Atrial fibrillation  Voltage criteria for left ventricular hypertrophy  Cannot rule out Septal infarct (cited on or before 25-OCT-2017)  Abnormal ECG  When compared with ECG of 31-OCT-2020 18:03,  Atrial fibrillation has replaced Sinus rhythm  Nonspecific T wave abnormality, worse in Lateral leads  Confirmed by Siomara Jones ([id number]) on 7/30/2021 8:26:17 PM              Assessment:   Active Problems:    New onset atrial fibrillation ([address] (7/30/2021)    Polysubstance abuse    Past medical history   HTN  HLD          Plan:   1. Cardiology consulted by ER. Lovenox 1mg/kg BID. Telemetry. Avoid BB due to cocaine use. Rate is normal at time of assessment   2. As needed antiemetics. IVF. Pepcid  3. CIWA protocol. Thiamine, folic acid, mvi  4. Monitor metabolic panel and renal function   5.  Full code               Liliane Amado NP  7/30/2021, 10:36 PM

## 2021-07-31 NOTE — DISCHARGE INSTRUCTIONS
Patient Education        Atrial Fibrillation: Care Instructions  Your Care Instructions     Atrial fibrillation is an irregular and often fast heartbeat. Treating this condition is important for several reasons. It can cause blood clots, which can travel from your heart to your brain and cause a stroke. If you have a fast heartbeat, you may feel lightheaded, dizzy, and weak. An irregular heartbeat can also increase your risk for heart failure. Atrial fibrillation is often the result of another heart condition, such as high blood pressure or coronary artery disease. Making changes to improve your heart condition will help you stay healthy and active. Follow-up care is a key part of your treatment and safety. Be sure to make and go to all appointments, and call your doctor if you are having problems. It's also a good idea to know your test results and keep a list of the medicines you take. How can you care for yourself at home? Medicines    · Take your medicines exactly as prescribed. Call your doctor if you think you are having a problem with your medicine. You will get more details on the specific medicines your doctor prescribes.     · If your doctor has given you a blood thinner to prevent a stroke, be sure you get instructions about how to take your medicine safely. Blood thinners can cause serious bleeding problems.     · Do not take any vitamins, over-the-counter drugs, or herbal products without talking to your doctor first.   Lifestyle changes    · Do not smoke. Smoking can increase your chance of a stroke and heart attack. If you need help quitting, talk to your doctor about stop-smoking programs and medicines. These can increase your chances of quitting for good.     · Eat a heart-healthy diet.     · Stay at a healthy weight. Lose weight if you need to.     · Limit alcohol to 2 drinks a day for men and 1 drink a day for women. Too much alcohol can cause health problems.     · Avoid colds and flu.  Get a pneumococcal vaccine shot. If you have had one before, ask your doctor whether you need another dose. Get a flu shot every year. If you must be around people with colds or flu, wash your hands often. Activity    · If your doctor recommends it, get more exercise. Walking is a good choice. Bit by bit, increase the amount you walk every day. Try for at least 30 minutes on most days of the week. You also may want to swim, bike, or do other activities. Your doctor may suggest that you join a cardiac rehabilitation program so that you can have help increasing your physical activity safely.     · Start light exercise if your doctor says it is okay. Even a small amount will help you get stronger, have more energy, and manage stress. Walking is an easy way to get exercise. Start out by walking a little more than you did in the hospital. Gradually increase the amount you walk.     · When you exercise, watch for signs that your heart is working too hard. You are pushing too hard if you cannot talk while you are exercising. If you become short of breath or dizzy or have chest pain, sit down and rest immediately.     · Check your pulse regularly. Place two fingers on the artery at the palm side of your wrist, in line with your thumb. If your heartbeat seems uneven or fast, talk to your doctor. When should you call for help? Call 911 anytime you think you may need emergency care. For example, call if:    · You have symptoms of a heart attack. These may include:  ? Chest pain or pressure, or a strange feeling in the chest.  ? Sweating. ? Shortness of breath. ? Nausea or vomiting. ? Pain, pressure, or a strange feeling in the back, neck, jaw, or upper belly or in one or both shoulders or arms. ? Lightheadedness or sudden weakness. ? A fast or irregular heartbeat. After you call 911, the  may tell you to chew 1 adult-strength or 2 to 4 low-dose aspirin. Wait for an ambulance.  Do not try to drive yourself.     · You have symptoms of a stroke. These may include:  ? Sudden numbness, tingling, weakness, or loss of movement in your face, arm, or leg, especially on only one side of your body. ? Sudden vision changes. ? Sudden trouble speaking. ? Sudden confusion or trouble understanding simple statements. ? Sudden problems with walking or balance. ? A sudden, severe headache that is different from past headaches.     · You passed out (lost consciousness). Call your doctor now or seek immediate medical care if:    · You have new or increased shortness of breath.     · You feel dizzy or lightheaded, or you feel like you may faint.     · Your heart rate becomes irregular.     · You can feel your heart flutter in your chest or skip heartbeats. Tell your doctor if these symptoms are new or worse. Watch closely for changes in your health, and be sure to contact your doctor if you have any problems. Where can you learn more? Go to http://www.gray.com/  Enter U020 in the search box to learn more about \"Atrial Fibrillation: Care Instructions. \"  Current as of: August 31, 2020               Content Version: 12.8  © 5443-8083 [employer]. Care instructions adapted under license by [employer] (which disclaims liability or warranty for this information). If you have questions about a medical condition or this instruction, always ask your healthcare professional. [address] any warranty or liability for your use of this information.          DISCHARGE SUMMARY from Nurse    PATIENT INSTRUCTIONS:    After general anesthesia or intravenous sedation, for 24 hours or while taking prescription Narcotics:  · Limit your activities  · Do not drive and operate hazardous machinery  · Do not make important personal or business decisions  · Do  not drink alcoholic beverages  · If you have not urinated within 8 hours after discharge, please contact your surgeon on call.    Report the following to your surgeon:  · Excessive pain, swelling, redness or odor of or around the surgical area  · Temperature over 100.5  · Nausea and vomiting lasting longer than 4 hours or if unable to take medications  · Any signs of decreased circulation or nerve impairment to extremity: change in color, persistent  numbness, tingling, coldness or increase pain  · Any questions    What to do at Home:  Recommended activity: Activity as tolerated. If you experience any of the following symptoms fever 101 or greater, change in mental status, nausea, vomiting, diarrhea, chest pain/any pain, heart palpitations or flutters, any problems or concerns. *  Please give a list of your current medications to your Primary Care Provider. *  Please update this list whenever your medications are discontinued, doses are      changed, or new medications (including over-the-counter products) are added. *  Please carry medication information at all times in case of emergency situations. These are general instructions for a healthy lifestyle:    No smoking/ No tobacco products/ Avoid exposure to second hand smoke  Surgeon General's Warning:  Quitting smoking now greatly reduces serious risk to your health. Obesity, smoking, and sedentary lifestyle greatly increases your risk for illness    A healthy diet, regular physical exercise & weight monitoring are important for maintaining a healthy lifestyle    You may be retaining fluid if you have a history of heart failure or if you experience any of the following symptoms:  Weight gain of 3 pounds or more overnight or 5 pounds in a week, increased swelling in our hands or feet or shortness of breath while lying flat in bed. Please call your doctor as soon as you notice any of these symptoms; do not wait until your next office visit. Patient armband removed and shredded  MyChart Activation    Thank you for requesting access to [employer].  Please follow the instructions below to securely access and download your online medical record. [employer] allows you to send messages to your doctor, view your test results, renew your prescriptions, schedule appointments, and more. How Do I Sign Up? 1. In your internet browser, go to [email or web address]  2. Click on the First Time User? Click Here link in the Sign In box. You will be redirect to the New Member Sign Up page. 3. Enter your [employer] Access Code exactly as it appears below. You will not need to use this code after youve completed the sign-up process. If you do not sign up before the expiration date, you must request a new code. [employer] Access Code: [email or web address]  Expires: 2021  4:13 PM (This is the date your [employer] access code will )    4. Enter the last four digits of your Social Security Number (xxxx) and Date of Birth (mm/dd/yyyy) as indicated and click Submit. You will be taken to the next sign-up page. 5. Create a [employer] ID. This will be your [employer] login ID and cannot be changed, so think of one that is secure and easy to remember. 6. Create a [employer] password. You can change your password at any time. 7. Enter your Password Reset Question and Answer. This can be used at a later time if you forget your password. 8. Enter your e-mail address. You will receive e-mail notification when new information is available in [address] 9. Click Sign Up. You can now view and download portions of your medical record. 10. Click the Download Summary menu link to download a portable copy of your medical information. Additional Information    If you have questions, please visit the Frequently Asked Questions section of the [employer] website at [email or web address] Remember, [employer] is NOT to be used for urgent needs. For medical emergencies, dial 911. The discharge information has been reviewed with the patient. The patient verbalized understanding.   Discharge medications reviewed with the patient and appropriate educational materials and side effects teaching were provided.   ___________________________________________________________________________________________________________________________________

## 2021-07-31 NOTE — DISCHARGE SUMMARY
[facility] Hospitalist Group  Discharge Summary       Patient: Cynthia Wallace Age: 60 y.o. : 1965 MR#: [id number] [id number]: [id number]  PCP on record: Filipe Jones MD  Admit date: 2021  Discharge date: 2021    Consults:  -Dr Jenna Valente- cardiology  -   Procedures: none  -     Significant Diagnostic Studies: -Cardiac echo 21:  Result status: Final result   · LV: Estimated LVEF is 50 - 55%. Visually measured ejection fraction. Normal cavity size, systolic function (ejection fraction normal) and diastolic function. Mildly increased wall thickness. Wall motion: normal.  · LA: Left Atrium volume index is 29 mL/m2. · AV: Mild aortic valve regurgitation is present. · TV: Right Ventricular Arterial Pressure (RVSP) is 23 mmHg. Pulmonary hypertension not suggested by Doppler findings. · PV: Mild pulmonic valve regurgitation is present. -  CXR 21:  IMPRESSION     1. No acute finding. 2. Hyperinflation, possibly due to good inspiratory effort or COPD. Discharge Diagnoses:      -Drug overdose  -Polysubstance abuse  -Paroxysmal afib     -UTI                                  Patient Active Problem List   Diagnosis Code    Alcohol abuse F10.10    Syncope R55    Cocaine abuse ([facility] F14.10    Elevated troponin R77.8    QT prolongation R94.31    NSTEMI (non-ST elevated myocardial infarction) ([facility] I21.4    New onset atrial fibrillation [facility]) I48.91       Hospital Course by Problem     Patient is a 57-year-old female who was seen in the emergency room after she presented via EMS for reported overdose. She was found apneic and was given Narcan and assisted with bag valve mask. She returned to normal state with these interventions. She admitted to doing heroin cocaine and alcohol. Initial evaluation was also significant for finding of change in rhythm from sinus rhythm to afib with RVR. Patient was admitted and monitored on telemetry.   She did well from the drug overdose and polysubstance abuse standpoint and was alert and awake the next day after discharge and in stable condition. In terms of her heart rate she went back into sinus rhythm. She had a cardiac echo done as part of the evaluation without significant results. Her TSH was within normal limits. On telemetry she was mostly bradycardic and did not need any type of rate control. Per cardiology note she has had a brief run of afib in the past when admitted for EtOH abuse in 2017. She has been discharged without any rate control medications. She has been discharged on ASA      She had an abnormal urinalysis and when asked she did endorse urinary urgency and bladder symptoms. She has been discharged with a prescription for a 5-day course of Macrobid. Patient overall stable on date of discharge without complaints of chest pain shortness of breath.       Today's examination of the patient revealed:     Subjective:     Objective:   VS:   Visit Vitals  BP (!) 166/91 (BP 1 Location: Left upper arm, BP Patient Position: At rest)   Pulse 61   Temp 97.8 °F (36.6 °C)   Resp 16   Ht 5' 4\" (1.626 m)   Wt 51.3 kg (113 lb)   SpO2 100%   Breastfeeding No   BMI 19.40 kg/m²      Tmax/24hrs: Temp (24hrs), Av.8 °F (36.6 °C), Min:97.4 °F (36.3 °C), Max:98.2 °F (36.8 °C)     Input/Output:     Intake/Output Summary (Last 24 hours) at 2021 1418  Last data filed at 2021 1339  Gross per 24 hour   Intake 826.67 ml   Output 200 ml   Net 626.67 ml       General:  Alert, awake, in NAD  Cardiovascular:  RRR, no murmurs  Pulmonary:  CTAB  GI:  abd soft, nt, nd  Extremities:  Without edema  Additional:      Labs:    Recent Results (from the past 24 hour(s))   EKG, 12 LEAD, INITIAL    Collection Time: 21  7:00 PM   Result Value Ref Range    Ventricular Rate 99 BPM    Atrial Rate 170 BPM    QRS Duration 90 ms    Q-T Interval 382 ms    QTC Calculation (Bezet) 490 ms    Calculated R Axis 71 degrees    Calculated T Axis 97 degrees    Diagnosis       Poor data quality, interpretation may be adversely affected  Atrial fibrillation  Voltage criteria for left ventricular hypertrophy  Cannot rule out Septal infarct (cited on or before 25-OCT-2017)  Abnormal ECG  When compared with ECG of 31-OCT-2020 18:03,  Atrial fibrillation has replaced Sinus rhythm  Nonspecific T wave abnormality, worse in Lateral leads  Confirmed by Zully Gross ([id number]) on 7/30/2021 8:26:17 PM     CBC WITH AUTOMATED DIFF    Collection Time: 07/30/21  7:17 PM   Result Value Ref Range    WBC 8.8 4.6 - 13.2 K/uL    RBC 3.92 (L) 4.20 - 5.30 M/uL    HGB 13.5 12.0 - 16.0 g/dL    HCT 40.3 35.0 - 45.0 %    .8 (H) 74.0 - 97.0 FL    MCH 34.4 (H) 24.0 - 34.0 PG    MCHC 33.5 31.0 - 37.0 g/dL    RDW 12.5 11.6 - 14.5 %    PLATELET 870 566 - 888 K/uL    MPV 9.3 9.2 - 11.8 FL    NEUTROPHILS 93 (H) 40 - 73 %    LYMPHOCYTES 5 (L) 21 - 52 %    MONOCYTES 2 (L) 3 - 10 %    EOSINOPHILS 0 0 - 5 %    BASOPHILS 0 0 - 2 %    ABS. NEUTROPHILS 8.2 (H) 1.8 - 8.0 K/UL    ABS. LYMPHOCYTES 0.4 (L) 0.9 - 3.6 K/UL    ABS. MONOCYTES 0.2 0.05 - 1.2 K/UL    ABS. EOSINOPHILS 0.0 0.0 - 0.4 K/UL    ABS.  BASOPHILS 0.0 0.0 - 0.1 K/UL    DF AUTOMATED     METABOLIC PANEL, BASIC    Collection Time: 07/30/21  7:17 PM   Result Value Ref Range    Sodium 140 136 - 145 mmol/L    Potassium 3.6 3.5 - 5.5 mmol/L    Chloride 113 (H) 100 - 111 mmol/L    CO2 19 (L) 21 - 32 mmol/L    Anion gap 8 3.0 - 18 mmol/L    Glucose 119 (H) 74 - 99 mg/dL    BUN 14 7.0 - 18 MG/DL    Creatinine 0.64 0.6 - 1.3 MG/DL    BUN/Creatinine ratio 22 (H) 12 - 20      GFR est AA >60 >60 ml/min/1.73m2    GFR est non-AA >60 >60 ml/min/1.73m2    Calcium 9.0 8.5 - 10.1 MG/DL   MAGNESIUM    Collection Time: 07/30/21  7:17 PM   Result Value Ref Range    Magnesium 1.7 1.6 - 2.6 mg/dL   DRUG SCREEN, URINE    Collection Time: 07/31/21  8:33 AM   Result Value Ref Range    BENZODIAZEPINES Negative NEG      BARBITURATES Negative NEG      THC (TH-CANNABINOL) Negative NEG      OPIATES Negative NEG      PCP(PHENCYCLIDINE) Negative NEG      COCAINE Positive (A) NEG      AMPHETAMINES Negative NEG      METHADONE Negative NEG      HDSCOM (NOTE)    URINALYSIS W/ RFLX MICROSCOPIC    Collection Time: 07/31/21  8:33 AM   Result Value Ref Range    Color YELLOW      Appearance CLOUDY      Specific gravity 1.023 1.005 - 1.030      pH (UA) 6.0 5.0 - 8.0      Protein 30 (A) NEG mg/dL    Glucose Negative NEG mg/dL    Ketone Negative NEG mg/dL    Bilirubin Negative NEG      Blood Negative NEG      Urobilinogen 1.0 0.2 - 1.0 EU/dL    Nitrites Negative NEG      Leukocyte Esterase MODERATE (A) NEG     URINE MICROSCOPIC ONLY    Collection Time: 07/31/21  8:33 AM   Result Value Ref Range    WBC 50 to 70 0 - 4 /hpf    RBC Negative 0 - 5 /hpf    Epithelial cells 1+ 0 - 5 /lpf    Bacteria 3+ (A) NEG /hpf   ECHO ADULT COMPLETE    Collection Time: 07/31/21 11:30 AM   Result Value Ref Range    IVSd 1.00 (A) 0.60 - 0.90 cm    LVIDd 4.60 3.90 - 5.30 cm    LVIDs 2.74 cm    LVOT d 1.99 cm    LVPWd 0.87 0.60 - 0.90 cm    LA Volume 45.19 22.0 - 52.0 mL    LA Area 4C 14.13 cm2    LA Vol 2C 50.28 22.00 - 52.00 mL    LA Vol 4C 30.33 22.00 - 52.00 mL    MV A Edouard 40.72 cm/s    Mitral Valve E Wave Deceleration Time 172.97 ms    MV E Edouard 71.65 cm/s    E/E' lateral 9.91     E/E' septal 8.31     LV E' Lateral Velocity 7.23 cm/s    LV E' Septal Velocity 8.62 cm/s    Tapse 1.81 1.50 - 2.00 cm    Triscuspid Valve Regurgitation Peak Gradient 20.27 mmHg    TR Max Velocity 225.12 cm/s    Ao Root D 3.94 cm    Pulmonary Vein \"A\" Wave Velocity 22.51 cm/s    P Vein A Dur 133.73 ms    MV E/A 1.76     LV Mass .0 67.0 - 162.0 g    LV Mass AL Index 94.7 43.0 - 95.0 g/m2    E/E' ratio (averaged) 9.11     LA Vol Index 29.54 16.00 - 28.00 ml/m2    LA Vol Index 32.86 16.00 - 28.00 ml/m2    LA Vol Index 19.82 16.00 - 28.00 ml/m2   TSH 3RD GENERATION    Collection Time: 07/31/21 12:31 PM   Result Value Ref Range    TSH 0.50 0.36 - 3.74 uIU/mL   CBC WITH AUTOMATED DIFF    Collection Time: 07/31/21 12:37 PM   Result Value Ref Range    WBC 3.6 (L) 4.6 - 13.2 K/uL    RBC 3.25 (L) 4.20 - 5.30 M/uL    HGB 11.6 (L) 12.0 - 16.0 g/dL    HCT 34.3 (L) 35.0 - 45.0 %    .5 (H) 74.0 - 97.0 FL    MCH 35.7 (H) 24.0 - 34.0 PG    MCHC 33.8 31.0 - 37.0 g/dL    RDW 12.5 11.6 - 14.5 %    PLATELET 984 610 - 308 K/uL    MPV 9.6 9.2 - 11.8 FL    NEUTROPHILS 52 40 - 73 %    LYMPHOCYTES 41 21 - 52 %    MONOCYTES 7 3 - 10 %    EOSINOPHILS 1 0 - 5 %    BASOPHILS 0 0 - 2 %    ABS. NEUTROPHILS 1.9 1.8 - 8.0 K/UL    ABS. LYMPHOCYTES 1.5 0.9 - 3.6 K/UL    ABS. MONOCYTES 0.2 0.05 - 1.2 K/UL    ABS. EOSINOPHILS 0.0 0.0 - 0.4 K/UL    ABS. BASOPHILS 0.0 0.0 - 0.1 K/UL    DF AUTOMATED     METABOLIC PANEL, COMPREHENSIVE    Collection Time: 07/31/21 12:37 PM   Result Value Ref Range    Sodium 140 136 - 145 mmol/L    Potassium 3.6 3.5 - 5.5 mmol/L    Chloride 108 100 - 111 mmol/L    CO2 28 21 - 32 mmol/L    Anion gap 4 3.0 - 18 mmol/L    Glucose 79 74 - 99 mg/dL    BUN 13 7.0 - 18 MG/DL    Creatinine 0.52 (L) 0.6 - 1.3 MG/DL    BUN/Creatinine ratio 25 (H) 12 - 20      GFR est AA >60 >60 ml/min/1.73m2    GFR est non-AA >60 >60 ml/min/1.73m2    Calcium 8.7 8.5 - 10.1 MG/DL    Bilirubin, total 0.5 0.2 - 1.0 MG/DL    ALT (SGPT) 18 13 - 56 U/L    AST (SGOT) 14 10 - 38 U/L    Alk. phosphatase 50 45 - 117 U/L    Protein, total 7.0 6.4 - 8.2 g/dL    Albumin 3.4 3.4 - 5.0 g/dL    Globulin 3.6 2.0 - 4.0 g/dL    A-G Ratio 0.9 0.8 - 1.7       Additional Data Reviewed:     Condition:   Disposition:    []Home   []Home with Home Health   []SNF/NH   []Rehab   []Home with family   []Alternate Facility:____________________      Discharge Medications:     Current Discharge Medication List      START taking these medications    Details   nitrofurantoin, macrocrystal-monohydrate, (MACROBID) 100 mg capsule Take 1 Capsule by mouth two (2) times a day for 5 days.   Qty: 10 Capsule, Refills: 0         CONTINUE these medications which have NOT CHANGED    Details   ibuprofen (MOTRIN) 600 mg tablet Take 1 Tab by mouth every eight (8) hours as needed for Pain. Qty: 20 Tab, Refills: 0      aspirin delayed-release 81 mg tablet Take 1 Tab by mouth daily. Qty: 30 Tab, Refills: 0      atorvastatin (LIPITOR) 20 mg tablet Take 1 Tab by mouth nightly. Qty: 30 Tab, Refills: 0      folic acid (FOLVITE) 1 mg tablet Take 1 Tab by mouth daily. Qty: 30 Tab, Refills: 0      therapeutic multivitamin (THERAGRAN) tablet Take 1 Tab by mouth daily. Qty: 30 Tab, Refills: 0      thiamine (B-1) 100 mg tablet Take 1 Tab by mouth daily. Qty: 30 Tab, Refills: 0      amLODIPine (NORVASC) 5 mg tablet Take 0.5 Tabs by mouth daily. Indications: hypertension  Qty: 30 Tab, Refills: 0      promethazine (PHENERGAN) 25 mg tablet Take 1 Tab by mouth every six (6) hours as needed. Indications: Nausea/Vomiting  Qty: 12 Tab, Refills: 0         STOP taking these medications       azithromycin (ZITHROMAX Z-SELVIN) 250 mg tablet Comments:   Reason for Stopping:         docusate sodium (COLACE) 100 mg capsule Comments:   Reason for Stopping: Follow-up Appointments:   1. Your PCP: Filipe Jones MD, within 7-10days  2.          Please follow-up on tests/labs that are still pendin.     >30 minutes spent coordinating this discharge (review instructions/follow-up, prescriptions, preparing report for sign off)    Signed:  Laney Hernandez MD  2021  2:18 PM

## 2021-07-31 NOTE — ROUTINE PROCESS
0120- received from ER via wheelchair. Telemetry applied. Hospital gown placed on patient. Patient in bathroom changing into gown. Returned to bed. Gait steady. Admission database completed. Call bell in reach. Side rails up x 2. Hat placed in bathroom to measure urine. Fall risk socks placed on patient. 0133- Zofran given for nausea. 0230- no nausea at present time. tv dinner given. Patient on phone. Patient denies pain at present time. 0444- Normal saline started at 100 ml/hr via IV pump. 7112- patient awake, CIWA protocol completed. 0630- Patient standing outside room 459. Telling charge nurse that her IV came out. Blood dripping on floor and clothing. Returned to bed. Changed linens. Bath items given and patient to bathroom to wash up. Patient very talkative. No complaints of chest pain or discomfort. No nausea noted. Patient remains in NSR since admission to floor.

## 2021-07-31 NOTE — PROGRESS NOTES
Problem: Falls - Risk of  Goal: *Absence of Falls  Description: Document Kyleigh Herbert Fall Risk and appropriate interventions in the flowsheet.   Outcome: Progressing Towards Goal  Note: Fall Risk Interventions:            Medication Interventions: Patient to call before getting OOB, Teach patient to arise slowly    Elimination Interventions: Call light in reach    History of Falls Interventions: Consult care management for discharge planning, Door open when patient unattended         Problem: Patient Education: Go to Patient Education Activity  Goal: Patient/Family Education  Outcome: Progressing Towards Goal

## 2021-07-31 NOTE — PROGRESS NOTES
conducted an initial consultation and Spiritual Assessment for Wilma Tinoco, who is a 60 y.o.,female. Patient's Primary Language is: [place]. According to the patient's EMR Religion Affiliation is: No Sikh. The reason the Patient came to the hospital is:   Patient Active Problem List    Diagnosis Date Noted    New onset atrial fibrillation ([facility] 07/30/2021    Alcohol abuse 10/24/2017    Syncope 10/24/2017    [facility] 10/24/2017    Elevated troponin 10/24/2017    QT prolongation 10/24/2017    NSTEMI (non-ST elevated myocardial infarction) ([facility] 10/24/2017        The  provided the following Interventions:  Initiated a relationship of care and support. Explored issues of reyna, belief, spirituality and Denominational/ritual needs while hospitalized. Listened empathically as she requested prayer. Offered prayer according to her stated need and assurance of continued prayers on patient's behalf. Chart reviewed. The following outcomes where achieved:  Patient shared limited information about both their medical narrative and spiritual journey/beliefs. Patient expressed gratitude for 's visit. Assessment:  Patient does not have any Denominational/cultural needs that will affect patient's preferences in health care. There are no spiritual or Denominational issues which require intervention at this time. Plan:  Chaplains will continue to follow and will provide pastoral care on an as needed/requested basis.  recommends bedside caregivers page  on duty if patient shows signs of acute spiritual or emotional distress.     [address] [place]   [phone]

## 2021-07-31 NOTE — PROGRESS NOTES
D/C instructions given to pt with verbal understanding of instructions. Emphasis on picking up prescription from [address] [place] [address]. Awaiting ride home.

## 2021-07-31 NOTE — CONSULTS
Cardiology Consult Note    Consultation request by Aftab Macias DO for advice/opinion related to evaluating afib    Date of  Admission: 7/30/2021  2:50 PM   Primary Care Physician:  Rohini Shaw MD    Consulting Cardiologist: Dr. Maria Teresa Rodriguez  Patient seen and examined independently. Patient was apneic at the time of admission which reportedly responded to Narcan. Note UDS positive for cocaine. Patient in atrial fibrillation at time of admission. She was in sinus rhythm by the time she reached the telemetry floor. Prior echo demonstrated a wall motion abnormality in the inferoseptal and basal mid inferior walls. EF was 45 to 50% as noted below. Patient is alert and oriented today and would like to be discharged if possible so she could attend [facility] in the morning. Agree with assessment and plan as noted below. Patient can be discharged from cardiac standpoint later today. Michael Dobbins MD   Assessment:     -Presented apneic s/p overdose, given Narcan with return to normal mental status. Admits to EtOH and heroin use. Hx cocaine abuse. UDS + for cocaine, otherwise negative.  -Paroxysmal afib, chart review indicates possible short run of afib when admitted for EtOH abuse in 10/2017.  -Echo 10/2017: EF 45-50% with hypokinesis of mid inferoseptal and basal-mid inferior walls. -Nuclear stress test 10/2017 was low-risk  -HTN    No primary cardiologist.  Seen remotely by Dr. Fariba Torres while admitted 10/2017 but no follow-up. Plan:     -Will check Echocardiogram, TSH, UDS, CXR for completeness. -UDS + for cocaine, otherwise negative. Would avoid BB due to cocaine use. -Will start 81 mg ASA. -UA ordered, revealed moderate leukocyte esterase, 50-70 WBC, 3+ bacteria, defer management to primary team.  -Pt needs to discontinue illicit drug use. -No further cardiac workup planned. Pt has been in sinus rhythm since she has been on the floor. History of Present Illness:      This is a 60 y.o. female admitted for New onset atrial fibrillation ([facility] [address] [I48.91]. Patient complains of: overdose    Pranay Del Rio is a 60 y.o. female that presented to the hospital s/p overdose. Medics reported that pt presented apneic and started on BVM and administered 1 mg Narcan with improvement to normal mental status. Pt denies any c/o chest pain or palpitations. Pt states she feels well currently and hoping to be discharged soon. She admits to snorting what she thought was heroin. Cardiac risk factors: hypertension      Review of Symptoms:  Except as stated above include:  Constitutional:  negative  Respiratory:  negative  Cardiovascular:  negative  Gastrointestinal: negative  Genitourinary:  negative  Musculoskeletal:  Negative  Neurological:  Negative  Dermatological:  Negative  Endocrinological: Negative  Psychological:  Negative       Past Medical History:     Past Medical History:   Diagnosis Date    Hypertension          Social History:     Social History     Socioeconomic History    Marital status: SINGLE     Spouse name: Not on file    Number of children: Not on file    Years of education: Not on file    Highest education level: Not on file   Tobacco Use    Smoking status: Never Smoker    Smokeless tobacco: Never Used   Substance and Sexual Activity    Alcohol use: Yes     Comment: occational beer    Drug use: No     Social Determinants of Health     Financial Resource Strain:     Difficulty of Paying Living Expenses:    Food Insecurity:     Worried About Running Out of Food in the Last Year:     Ran Out of Food in the Last Year:    Transportation Needs:     Lack of Transportation (Medical):      Lack of Transportation (Non-Medical):    Physical Activity:     Days of Exercise per Week:     Minutes of Exercise per Session:    Stress:     Feeling of Stress :    Social Connections:     Frequency of Communication with Friends and Family:     Frequency of Social Gatherings with Friends and Family:     Attends Gnosticist Services:     Active Member of Clubs or Organizations:     Attends Club or Organization Meetings:     Marital Status:         Family History:   No family history on file.      Medications:   No Known Allergies     Current Facility-Administered Medications   Medication Dose Route Frequency    LORazepam (ATIVAN) injection 1 mg  1 mg IntraVENous NOW    sodium chloride (NS) flush 5-40 mL  5-40 mL IntraVENous Q8H    sodium chloride (NS) flush 5-40 mL  5-40 mL IntraVENous PRN    sodium chloride (NS) flush 5-40 mL  5-40 mL IntraVENous Q8H    sodium chloride (NS) flush 5-40 mL  5-40 mL IntraVENous PRN    acetaminophen (TYLENOL) tablet 650 mg  650 mg Oral Q6H PRN    Or    acetaminophen (TYLENOL) suppository 650 mg  650 mg Rectal Q6H PRN    polyethylene glycol (MIRALAX) packet 17 g  17 g Oral DAILY PRN    ondansetron (ZOFRAN ODT) tablet 4 mg  4 mg Oral Q8H PRN    Or    ondansetron (ZOFRAN) injection 4 mg  4 mg IntraVENous Q6H PRN    famotidine (PEPCID) tablet 20 mg  20 mg Oral BID    folic acid (FOLVITE) tablet 1 mg  1 mg Oral DAILY    ibuprofen (MOTRIN) tablet 600 mg  600 mg Oral Q8H PRN    therapeutic multivitamin (THERAGRAN) tablet 1 Tablet  1 Tablet Oral DAILY    thiamine HCL (B-1) tablet 100 mg  100 mg Oral DAILY    LORazepam (ATIVAN) tablet 1 mg  1 mg Oral Q1H PRN    Or    LORazepam (ATIVAN) injection 1 mg  1 mg IntraVENous Q1H PRN    LORazepam (ATIVAN) tablet 2 mg  2 mg Oral Q1H PRN    Or    LORazepam (ATIVAN) injection 2 mg  2 mg IntraVENous Q1H PRN    LORazepam (ATIVAN) injection 3 mg  3 mg IntraVENous Q15MIN PRN    sodium chloride (NS) flush 5-40 mL  5-40 mL IntraVENous PRN    enoxaparin (LOVENOX) injection 50 mg  1 mg/kg SubCUTAneous Q12H    0.9% sodium chloride infusion  100 mL/hr IntraVENous CONTINUOUS         Physical Exam:     Visit Vitals  BP (!) 128/94 (BP 1 Location: Left upper arm, BP Patient Position: At rest)   Pulse 75   Temp 97.7 °F (36.5 °C) Resp 16   Ht 5' 4\" (1.626 m)   Wt 51.3 kg (113 lb)   SpO2 99%   Breastfeeding No   BMI 19.40 kg/m²       TELE: normal sinus rhythm    BP Readings from Last 3 Encounters:   07/31/21 (!) 128/94   10/31/20 (!) 115/96   04/27/20 (!) 147/104     Pulse Readings from Last 3 Encounters:   07/31/21 75   10/31/20 81   04/27/20 62     Wt Readings from Last 3 Encounters:   07/31/21 51.3 kg (113 lb)   04/27/20 53.1 kg (117 lb 1.6 oz)   11/22/19 57.4 kg (126 lb 9.6 oz)       General:  alert, cooperative, no distress, appears stated age  Neck:  supple  Lungs:  clear to auscultation bilaterally  Heart:  regular rate and rhythm  Abdomen:  abdomen is soft without significant tenderness, masses, organomegaly or guarding  Extremities:  atraumatic, no edema  Skin: Warm and dry.    Neuro: alert, oriented x3, affect appropriate, no focal neurological deficits, moves all extremities well, no involuntary movements  Psych: non focal     Data Review:     Recent Labs     07/30/21 1917   WBC 8.8   HGB 13.5   HCT 40.3        Recent Labs     07/30/21 1917      K 3.6   *   CO2 19*   *   BUN 14   CREA 0.64   CA 9.0   MG 1.7       Results for orders placed or performed during the hospital encounter of 07/30/21   EKG, 12 LEAD, INITIAL   Result Value Ref Range    Ventricular Rate 99 BPM    Atrial Rate 170 BPM    QRS Duration 90 ms    Q-T Interval 382 ms    QTC Calculation (Bezet) 490 ms    Calculated R Axis 71 degrees    Calculated T Axis 97 degrees    Diagnosis       Poor data quality, interpretation may be adversely affected  Atrial fibrillation  Voltage criteria for left ventricular hypertrophy  Cannot rule out Septal infarct (cited on or before 25-OCT-2017)  Abnormal ECG  When compared with ECG of 31-OCT-2020 18:03,  Atrial fibrillation has replaced Sinus rhythm  Nonspecific T wave abnormality, worse in Lateral leads  Confirmed by Rita Gagnon ([id number]) on 7/30/2021 8:26:17 PM         All Cardiac Markers in the last 24 hours: No results found for: CPK, CK, CKMMB, CKMB, RCK3, CKMBT, CKNDX, CKND1, TIFFANI, TROPT, TROIQ, DAMION, TROPT, TNIPOC, BNP, BNPP    Last Lipid:    Lab Results   Component Value Date/Time    Cholesterol, total 166 10/25/2017 02:29 AM    HDL Cholesterol 83 (H) 10/25/2017 02:29 AM    LDL, calculated 76 10/25/2017 02:29 AM    Triglyceride 35 10/25/2017 02:29 AM    CHOL/HDL Ratio 2.0 10/25/2017 02:29 AM       Cardiographics:     EKG Results     Procedure 720 Value Units Date/Time    EKG, 12 LEAD, INITIAL [[id number]] Collected: 07/30/21 1900    Order Status: Completed Updated: 07/30/21 2026     Ventricular Rate 99 BPM      Atrial Rate 170 BPM      QRS Duration 90 ms      Q-T Interval 382 ms      QTC Calculation (Bezet) 490 ms      Calculated R Axis 71 degrees      Calculated T Axis 97 degrees      Diagnosis --     Poor data quality, interpretation may be adversely affected  Atrial fibrillation  Voltage criteria for left ventricular hypertrophy  Cannot rule out Septal infarct (cited on or before 25-OCT-2017)  Abnormal ECG  When compared with ECG of 31-OCT-2020 18:03,  Atrial fibrillation has replaced Sinus rhythm  Nonspecific T wave abnormality, worse in Lateral leads  Confirmed by Rita Gagnon ([id number]) on 7/30/2021 8:26:17 PM                    XR Results (most recent):  Results from Hospital Encounter encounter on 04/27/20    XR FOOT LT MIN 3 V    Narrative  EXAM:  XR FOOT LT MIN 3 V    INDICATION:   FB plantar/distal    COMPARISON:  None. FINDINGS:  3 views of the left foot demonstrate no fracture or malalignment. Joint spaces are preserved. There is a radiopaque 4 mm linear structure plantar  aspect between the first and second toes. Punctate densities probably  calcifications or foreign bodies also lateral to the great toe distal phalanx. Impression  IMPRESSION:    No acute osseous abnormalities. Small foreign bodies in the plantar forefoot  between the first and second toes as discussed.         Signed By: Dillon Branch Bear Stafford PA-C     July 31, 2021

## 2021-07-31 NOTE — PROGRESS NOTES
Problem: Falls - Risk of  Goal: *Absence of Falls  Description: Document Ashli Isaacs Fall Risk and appropriate interventions in the flowsheet.   7/31/2021 1432 by Arielle Moreno RN  Outcome: Resolved/Met  Note: Fall Risk Interventions:            Medication Interventions: Patient to call before getting OOB, Teach patient to arise slowly    Elimination Interventions: Call light in reach    History of Falls Interventions: Consult care management for discharge planning, Door open when patient unattended      7/31/2021 1037 by Arielle Moreno RN  Outcome: Progressing Towards Goal  Note: Fall Risk Interventions:            Medication Interventions: Patient to call before getting OOB, Teach patient to arise slowly    Elimination Interventions: Call light in reach    History of Falls Interventions: Consult care management for discharge planning, Door open when patient unattended         Problem: Patient Education: Go to Patient Education Activity  Goal: Patient/Family Education  7/31/2021 1432 by Arielle Moreno RN  Outcome: Resolved/Met  7/31/2021 1037 by Arielle Moreno RN  Outcome: Progressing Towards Goal

## 2021-08-01 LAB
ATRIAL RATE: 58 BPM
CALCULATED P AXIS, ECG09: 30 DEGREES
CALCULATED R AXIS, ECG10: 57 DEGREES
CALCULATED T AXIS, ECG11: 48 DEGREES
DIAGNOSIS, 93000: NORMAL
P-R INTERVAL, ECG05: 170 MS
Q-T INTERVAL, ECG07: 458 MS
QRS DURATION, ECG06: 78 MS
QTC CALCULATION (BEZET), ECG08: 449 MS
VENTRICULAR RATE, ECG03: 58 BPM

## 2021-09-04 ENCOUNTER — HOSPITAL ENCOUNTER (EMERGENCY)
Age: 56
Discharge: HOME | End: 2021-09-04
Attending: STUDENT IN AN ORGANIZED HEALTH CARE EDUCATION/TRAINING PROGRAM
Payer: MEDICAID

## 2021-09-04 ENCOUNTER — APPOINTMENT (OUTPATIENT)
Dept: GENERAL RADIOLOGY | Age: 56
End: 2021-09-04
Attending: PHYSICIAN ASSISTANT
Payer: MEDICAID

## 2021-09-04 VITALS
TEMPERATURE: 97.4 F | RESPIRATION RATE: 20 BRPM | DIASTOLIC BLOOD PRESSURE: 98 MMHG | OXYGEN SATURATION: 99 % | HEART RATE: 77 BPM | SYSTOLIC BLOOD PRESSURE: 136 MMHG | WEIGHT: 115 LBS | BODY MASS INDEX: 20.38 KG/M2 | HEIGHT: 63 IN

## 2021-09-04 DIAGNOSIS — F19.10 DRUG ABUSE (HCC): Primary | ICD-10-CM

## 2021-09-04 LAB
ALBUMIN SERPL-MCNC: 3.9 G/DL (ref 3.4–5)
ALBUMIN/GLOB SERPL: 0.9 {RATIO} (ref 0.8–1.7)
ALP SERPL-CCNC: 78 U/L (ref 45–117)
ALT SERPL-CCNC: 25 U/L (ref 13–56)
ANION GAP SERPL CALC-SCNC: 5 MMOL/L (ref 3–18)
AST SERPL-CCNC: 50 U/L (ref 10–38)
BASOPHILS # BLD: 0 K/UL (ref 0–0.1)
BASOPHILS NFR BLD: 0 % (ref 0–2)
BILIRUB SERPL-MCNC: 0.5 MG/DL (ref 0.2–1)
BUN SERPL-MCNC: 7 MG/DL (ref 7–18)
BUN/CREAT SERPL: 11 (ref 12–20)
CALCIUM SERPL-MCNC: 8.5 MG/DL (ref 8.5–10.1)
CHLORIDE SERPL-SCNC: 108 MMOL/L (ref 100–111)
CO2 SERPL-SCNC: 28 MMOL/L (ref 21–32)
CREAT SERPL-MCNC: 0.61 MG/DL (ref 0.6–1.3)
DIFFERENTIAL METHOD BLD: ABNORMAL
EOSINOPHIL # BLD: 0 K/UL (ref 0–0.4)
EOSINOPHIL NFR BLD: 0 % (ref 0–5)
ERYTHROCYTE [DISTWIDTH] IN BLOOD BY AUTOMATED COUNT: 12.3 % (ref 11.6–14.5)
ETHANOL SERPL-MCNC: 28 MG/DL (ref 0–3)
GLOBULIN SER CALC-MCNC: 4.5 G/DL (ref 2–4)
GLUCOSE SERPL-MCNC: 128 MG/DL (ref 74–99)
HCT VFR BLD AUTO: 39.3 % (ref 35–45)
HGB BLD-MCNC: 13.6 G/DL (ref 12–16)
LYMPHOCYTES # BLD: 0.4 K/UL (ref 0.9–3.6)
LYMPHOCYTES NFR BLD: 4 % (ref 21–52)
MCH RBC QN AUTO: 34.9 PG (ref 24–34)
MCHC RBC AUTO-ENTMCNC: 34.6 G/DL (ref 31–37)
MCV RBC AUTO: 100.8 FL (ref 78–100)
MONOCYTES # BLD: 0.3 K/UL (ref 0.05–1.2)
MONOCYTES NFR BLD: 3 % (ref 3–10)
NEUTS SEG # BLD: 10.4 K/UL (ref 1.8–8)
NEUTS SEG NFR BLD: 93 % (ref 40–73)
PLATELET # BLD AUTO: 184 K/UL (ref 135–420)
PMV BLD AUTO: 9.1 FL (ref 9.2–11.8)
POTASSIUM SERPL-SCNC: 4.7 MMOL/L (ref 3.5–5.5)
PROT SERPL-MCNC: 8.4 G/DL (ref 6.4–8.2)
RBC # BLD AUTO: 3.9 M/UL (ref 4.2–5.3)
SODIUM SERPL-SCNC: 141 MMOL/L (ref 136–145)
TROPONIN I SERPL-MCNC: <0.02 NG/ML (ref 0–0.04)
WBC # BLD AUTO: 11.2 K/UL (ref 4.6–13.2)

## 2021-09-04 PROCEDURE — 85025 COMPLETE CBC W/AUTO DIFF WBC: CPT

## 2021-09-04 PROCEDURE — 84484 ASSAY OF TROPONIN QUANT: CPT

## 2021-09-04 PROCEDURE — 80053 COMPREHEN METABOLIC PANEL: CPT

## 2021-09-04 PROCEDURE — 99282 EMERGENCY DEPT VISIT SF MDM: CPT

## 2021-09-04 PROCEDURE — 82077 ASSAY SPEC XCP UR&BREATH IA: CPT

## 2021-09-04 PROCEDURE — 93005 ELECTROCARDIOGRAM TRACING: CPT

## 2021-09-04 PROCEDURE — 74011250637 HC RX REV CODE- 250/637: Performed by: PHYSICIAN ASSISTANT

## 2021-09-04 PROCEDURE — 71045 X-RAY EXAM CHEST 1 VIEW: CPT

## 2021-09-04 RX ORDER — ONDANSETRON 8 MG/1
8 TABLET, ORALLY DISINTEGRATING ORAL
Status: COMPLETED | OUTPATIENT
Start: 2021-09-04 | End: 2021-09-04

## 2021-09-04 RX ORDER — NALOXONE HYDROCHLORIDE 4 MG/.1ML
SPRAY NASAL
Qty: 2 EACH | Refills: 0 | Status: SHIPPED | OUTPATIENT
Start: 2021-09-04 | End: 2021-12-25 | Stop reason: SDUPTHER

## 2021-09-04 RX ADMIN — ONDANSETRON 8 MG: 8 TABLET, ORALLY DISINTEGRATING ORAL at 20:52

## 2021-09-04 NOTE — ED NOTES
Pt doing Cocaine, per patient. Pt denies heroin, states she did x1 week ago. Pt has no medical complaints except stomach pain, at this time. Brought by EMS for drug overdose.

## 2021-09-04 NOTE — ED PROVIDER NOTES
EMERGENCY DEPARTMENT HISTORY AND PHYSICAL EXAM    5:25 PM      Date: 9/4/2021  Patient Name: Emmanuel Dunaway    History of Presenting Illness     No chief complaint on file. History Provided By: Patient, EMS     Additional History (Context): Emmanuel Dunaway is a 60 y.o. female with hx of drug abuse, atrial fibrillation, HTN, and other noted PMH who presents to the ED due to possible overdose. EMS note patient was found unresponsive, apneic. 1 mg IN Narcan administered, pt became alert and responsive. Pt admits to cocaine use, denies heroin or alcohol use today. Pt notes nausea. Patient denies dizziness, headache, chest pain, shortness of breath, abdominal pain, vomiting, diarrhea. Denies SI, HI, visual or auditory hallucinations. PCP: Marlon Wong MD    Current Outpatient Medications   Medication Sig Dispense Refill    naloxone (Narcan) 4 mg/actuation nasal spray Use 1 spray intranasally, then discard. Repeat with new spray every 2 min as needed for opioid overdose symptoms, alternating nostrils. 2 Each 0    aspirin delayed-release 81 mg tablet Take 1 Tab by mouth daily. 30 Tab 0    atorvastatin (LIPITOR) 20 mg tablet Take 1 Tab by mouth nightly. 30 Tab 0    folic acid (FOLVITE) 1 mg tablet Take 1 Tab by mouth daily. 30 Tab 0    therapeutic multivitamin (THERAGRAN) tablet Take 1 Tab by mouth daily. 30 Tab 0    thiamine (B-1) 100 mg tablet Take 1 Tab by mouth daily. 30 Tab 0    amLODIPine (NORVASC) 5 mg tablet Take 0.5 Tabs by mouth daily. Indications: hypertension 30 Tab 0       Past History     Past Medical History:  Past Medical History:   Diagnosis Date    Hypertension        Past Surgical History:  No past surgical history on file. Family History:  No family history on file. Social History:  Social History     Tobacco Use    Smoking status: Never Smoker    Smokeless tobacco: Never Used   Substance Use Topics    Alcohol use: Yes     Comment: occational beer    Drug use:  No Allergies:  No Known Allergies      Review of Systems       Review of Systems   Constitutional: Negative for chills and fever. Respiratory: Negative for shortness of breath. Cardiovascular: Negative for chest pain. Gastrointestinal: Positive for nausea. Negative for abdominal pain and vomiting. Skin: Negative for rash. Neurological: Negative for weakness. All other systems reviewed and are negative. Physical Exam     Visit Vitals  BP (!) 136/98 (BP 1 Location: Left upper arm, BP Patient Position: At rest)   Pulse 77   Temp 97.4 °F (36.3 °C)   Resp 20   Ht 5' 3\" (1.6 m)   Wt 52.2 kg (115 lb)   SpO2 99%   BMI 20.37 kg/m²         Physical Exam  Vitals and nursing note reviewed. Constitutional:       General: She is not in acute distress. Appearance: Normal appearance. She is well-developed. She is not ill-appearing or diaphoretic. HENT:      Head: Normocephalic and atraumatic. Cardiovascular:      Rate and Rhythm: Normal rate and regular rhythm. Heart sounds: Normal heart sounds. No murmur heard. No friction rub. No gallop. Pulmonary:      Effort: Pulmonary effort is normal. No respiratory distress. Breath sounds: Normal breath sounds. No wheezing or rales. Musculoskeletal:         General: Normal range of motion. Cervical back: Normal range of motion and neck supple. Skin:     General: Skin is warm. Findings: No rash. Neurological:      General: No focal deficit present. Mental Status: She is alert and oriented to person, place, and time. Cranial Nerves: No cranial nerve deficit. Sensory: No sensory deficit. Motor: No weakness.            Diagnostic Study Results     Labs -  Recent Results (from the past 12 hour(s))   EKG, 12 LEAD, INITIAL    Collection Time: 09/04/21  8:33 PM   Result Value Ref Range    Ventricular Rate 70 BPM    Atrial Rate 70 BPM    P-R Interval 152 ms    QRS Duration 90 ms    Q-T Interval 496 ms    QTC Calculation (Bezet) 535 ms    Calculated P Axis 58 degrees    Calculated R Axis 45 degrees    Calculated T Axis 59 degrees    Diagnosis       Poor data quality, interpretation may be adversely affected  Normal sinus rhythm  Minimal voltage criteria for LVH, may be normal variant ( Sokolow-Fry )  Septal infarct (cited on or before 25-OCT-2017)  Abnormal ECG  When compared with ECG of 31-JUL-2021 09:16,  ST no longer depressed in Anterior leads  QT has lengthened     CBC WITH AUTOMATED DIFF    Collection Time: 09/04/21  8:41 PM   Result Value Ref Range    WBC 11.2 4.6 - 13.2 K/uL    RBC 3.90 (L) 4.20 - 5.30 M/uL    HGB 13.6 12.0 - 16.0 g/dL    HCT 39.3 35.0 - 45.0 %    .8 (H) 78.0 - 100.0 FL    MCH 34.9 (H) 24.0 - 34.0 PG    MCHC 34.6 31.0 - 37.0 g/dL    RDW 12.3 11.6 - 14.5 %    PLATELET 991 510 - 140 K/uL    MPV 9.1 (L) 9.2 - 11.8 FL    NEUTROPHILS 93 (H) 40 - 73 %    LYMPHOCYTES 4 (L) 21 - 52 %    MONOCYTES 3 3 - 10 %    EOSINOPHILS 0 0 - 5 %    BASOPHILS 0 0 - 2 %    ABS. NEUTROPHILS 10.4 (H) 1.8 - 8.0 K/UL    ABS. LYMPHOCYTES 0.4 (L) 0.9 - 3.6 K/UL    ABS. MONOCYTES 0.3 0.05 - 1.2 K/UL    ABS. EOSINOPHILS 0.0 0.0 - 0.4 K/UL    ABS. BASOPHILS 0.0 0.0 - 0.1 K/UL    DF AUTOMATED     METABOLIC PANEL, COMPREHENSIVE    Collection Time: 09/04/21  8:41 PM   Result Value Ref Range    Sodium 141 136 - 145 mmol/L    Potassium 4.7 3.5 - 5.5 mmol/L    Chloride 108 100 - 111 mmol/L    CO2 28 21 - 32 mmol/L    Anion gap 5 3.0 - 18 mmol/L    Glucose 128 (H) 74 - 99 mg/dL    BUN 7 7.0 - 18 MG/DL    Creatinine 0.61 0.6 - 1.3 MG/DL    BUN/Creatinine ratio 11 (L) 12 - 20      GFR est AA >60 >60 ml/min/1.73m2    GFR est non-AA >60 >60 ml/min/1.73m2    Calcium 8.5 8.5 - 10.1 MG/DL    Bilirubin, total 0.5 0.2 - 1.0 MG/DL    ALT (SGPT) 25 13 - 56 U/L    AST (SGOT) 50 (H) 10 - 38 U/L    Alk.  phosphatase 78 45 - 117 U/L    Protein, total 8.4 (H) 6.4 - 8.2 g/dL    Albumin 3.9 3.4 - 5.0 g/dL    Globulin 4.5 (H) 2.0 - 4.0 g/dL    A-G Ratio 0.9 0.8 - 1.7     ETHYL ALCOHOL    Collection Time: 09/04/21  8:41 PM   Result Value Ref Range    ALCOHOL(ETHYL),SERUM 28 (H) 0 - 3 MG/DL   TROPONIN I    Collection Time: 09/04/21  8:41 PM   Result Value Ref Range    Troponin-I, QT <0.02 0.0 - 0.045 NG/ML       Radiologic Studies -   XR CHEST SNGL V   Final Result      Probable mild medial left lower lobe atelectasis. No other radiographic finding   for an acute cardiopulmonary process. Medical Decision Making   I am the first provider for this patient. I reviewed the vital signs, available nursing notes, past medical history, past surgical history, family history and social history. Vital Signs-Reviewed the patient's vital signs. Pulse Oximetry Analysis -  99% on room air   Records Reviewed: Nursing Notes, Old Medical Records and Previous electrocardiograms (Time of Review: 5:25 PM)    ED Course: Progress Notes, Reevaluation, and Consults:  8:30 PM: Pt resting comfortably, no distress. 10:10 PM Reviewed results with patient. Discussed need for close outpatient follow-up this week for reassessment. Discussed strict return precautions, including abdominal pain, vomiting, or any other medical concerns. Patient in agreement with plan, ready to go home    Pt ambulated in ED, calling ride. Provider Notes (Medical Decision Making): 57-year-old female who presents to the ED due to overdose. Narcan administered en route. Patient is alert and oriented, no distress. Observed in the ED x6 hours, ambulatory, ready to go home. Stable for discharge with close outpatient follow-up for further assessment. Strict return precautions provided. Diagnosis     Clinical Impression:   1.  Drug abuse ([address]        Disposition: home     Follow-up Information     Follow up With Specialties Details Why [address] [facility] EMERGENCY DEPT Emergency Medicine  If symptoms worsen [address] [address]  Poornima Kim, Taty Mack, MD Internal Medicine Schedule an appointment as soon as possible for a visit   Mannie Maxwell [phone]             Patient's Medications   Start Taking    NALOXONE [facility]) 4 MG/ACTUATION NASAL SPRAY    Use 1 spray intranasally, then discard. Repeat with new spray every 2 min as needed for opioid overdose symptoms, alternating nostrils. Continue Taking    AMLODIPINE (NORVASC) 5 MG TABLET    Take 0.5 Tabs by mouth daily. Indications: hypertension    ASPIRIN DELAYED-RELEASE 81 MG TABLET    Take 1 Tab by mouth daily. ATORVASTATIN (LIPITOR) 20 MG TABLET    Take 1 Tab by mouth nightly. FOLIC ACID (FOLVITE) 1 MG TABLET    Take 1 Tab by mouth daily. THERAPEUTIC MULTIVITAMIN (THERAGRAN) TABLET    Take 1 Tab by mouth daily. THIAMINE (B-1) 100 MG TABLET    Take 1 Tab by mouth daily. These Medications have changed    No medications on file   Stop Taking    IBUPROFEN (MOTRIN) 600 MG TABLET    Take 1 Tab by mouth every eight (8) hours as needed for Pain. PROMETHAZINE (PHENERGAN) 25 MG TABLET    Take 1 Tab by mouth every six (6) hours as needed. Indications: Nausea/Vomiting       Dictation disclaimer:  Please note that this dictation was completed with [employer], the computer voice recognition software. Quite often unanticipated grammatical, syntax, homophones, and other interpretive errors are inadvertently transcribed by the computer software. Please disregard these errors. Please excuse any errors that have escaped final proofreading.

## 2021-09-05 LAB
ATRIAL RATE: 70 BPM
CALCULATED P AXIS, ECG09: 58 DEGREES
CALCULATED R AXIS, ECG10: 45 DEGREES
CALCULATED T AXIS, ECG11: 59 DEGREES
DIAGNOSIS, 93000: NORMAL
P-R INTERVAL, ECG05: 152 MS
Q-T INTERVAL, ECG07: 496 MS
QRS DURATION, ECG06: 90 MS
QTC CALCULATION (BEZET), ECG08: 535 MS
VENTRICULAR RATE, ECG03: 70 BPM

## 2021-09-06 ENCOUNTER — APPOINTMENT (OUTPATIENT)
Dept: GENERAL RADIOLOGY | Age: 56
End: 2021-09-06
Attending: PHYSICIAN ASSISTANT
Payer: MEDICAID

## 2021-09-06 ENCOUNTER — HOSPITAL ENCOUNTER (OUTPATIENT)
Age: 56
Setting detail: OBSERVATION
Discharge: HOME | End: 2021-09-07
Attending: EMERGENCY MEDICINE | Admitting: HOSPITALIST
Payer: MEDICAID

## 2021-09-06 DIAGNOSIS — A59.9 TRICHIMONIASIS: ICD-10-CM

## 2021-09-06 DIAGNOSIS — R07.9 CHEST PAIN, UNSPECIFIED TYPE: Primary | ICD-10-CM

## 2021-09-06 DIAGNOSIS — N30.00 ACUTE CYSTITIS WITHOUT HEMATURIA: ICD-10-CM

## 2021-09-06 PROBLEM — N39.0 UTI (URINARY TRACT INFECTION): Status: ACTIVE | Noted: 2021-09-06

## 2021-09-06 PROBLEM — R94.31 ABNORMAL EKG: Status: ACTIVE | Noted: 2021-09-06

## 2021-09-06 LAB
ALBUMIN SERPL-MCNC: 4.1 G/DL (ref 3.4–5)
ALBUMIN/GLOB SERPL: 1 {RATIO} (ref 0.8–1.7)
ALP SERPL-CCNC: 75 U/L (ref 45–117)
ALT SERPL-CCNC: 22 U/L (ref 13–56)
AMPHET UR QL SCN: NEGATIVE
ANION GAP SERPL CALC-SCNC: 3 MMOL/L (ref 3–18)
APPEARANCE UR: ABNORMAL
AST SERPL-CCNC: 31 U/L (ref 10–38)
ATRIAL RATE: 71 BPM
ATRIAL RATE: 72 BPM
BACTERIA URNS QL MICRO: ABNORMAL /HPF
BARBITURATES UR QL SCN: NEGATIVE
BASOPHILS # BLD: 0 K/UL (ref 0–0.1)
BASOPHILS NFR BLD: 0 % (ref 0–2)
BENZODIAZ UR QL: NEGATIVE
BILIRUB SERPL-MCNC: 1 MG/DL (ref 0.2–1)
BILIRUB UR QL: NEGATIVE
BNP SERPL-MCNC: 137 PG/ML (ref 0–900)
BUN SERPL-MCNC: 10 MG/DL (ref 7–18)
BUN/CREAT SERPL: 18 (ref 12–20)
CALCIUM SERPL-MCNC: 8.6 MG/DL (ref 8.5–10.1)
CALCULATED P AXIS, ECG09: 58 DEGREES
CALCULATED P AXIS, ECG09: 65 DEGREES
CALCULATED R AXIS, ECG10: 58 DEGREES
CALCULATED R AXIS, ECG10: 63 DEGREES
CALCULATED T AXIS, ECG11: 76 DEGREES
CALCULATED T AXIS, ECG11: 78 DEGREES
CANNABINOIDS UR QL SCN: NEGATIVE
CHLORIDE SERPL-SCNC: 102 MMOL/L (ref 100–111)
CK MB CFR SERPL CALC: 0.5 % (ref 0–4)
CK MB SERPL-MCNC: 1.7 NG/ML (ref 5–25)
CK SERPL-CCNC: 368 U/L (ref 26–192)
CO2 SERPL-SCNC: 33 MMOL/L (ref 21–32)
COCAINE UR QL SCN: POSITIVE
COLOR UR: YELLOW
CREAT SERPL-MCNC: 0.57 MG/DL (ref 0.6–1.3)
CRP SERPL-MCNC: 1.5 MG/DL (ref 0–0.3)
DIAGNOSIS, 93000: NORMAL
DIAGNOSIS, 93000: NORMAL
DIFFERENTIAL METHOD BLD: ABNORMAL
EOSINOPHIL # BLD: 0.1 K/UL (ref 0–0.4)
EOSINOPHIL NFR BLD: 2 % (ref 0–5)
EPITH CASTS URNS QL MICRO: ABNORMAL /LPF (ref 0–5)
ERYTHROCYTE [DISTWIDTH] IN BLOOD BY AUTOMATED COUNT: 12.4 % (ref 11.6–14.5)
ERYTHROCYTE [SEDIMENTATION RATE] IN BLOOD: 19 MM/HR (ref 0–30)
GLOBULIN SER CALC-MCNC: 4 G/DL (ref 2–4)
GLUCOSE SERPL-MCNC: 81 MG/DL (ref 74–99)
GLUCOSE UR STRIP.AUTO-MCNC: NEGATIVE MG/DL
HCT VFR BLD AUTO: 37.4 % (ref 35–45)
HDSCOM,HDSCOM: ABNORMAL
HGB BLD-MCNC: 13 G/DL (ref 12–16)
HGB UR QL STRIP: ABNORMAL
KETONES UR QL STRIP.AUTO: ABNORMAL MG/DL
LEUKOCYTE ESTERASE UR QL STRIP.AUTO: ABNORMAL
LYMPHOCYTES # BLD: 1.5 K/UL (ref 0.9–3.6)
LYMPHOCYTES NFR BLD: 36 % (ref 21–52)
MCH RBC QN AUTO: 35.1 PG (ref 24–34)
MCHC RBC AUTO-ENTMCNC: 34.8 G/DL (ref 31–37)
MCV RBC AUTO: 101.1 FL (ref 78–100)
METHADONE UR QL: NEGATIVE
MONOCYTES # BLD: 0.3 K/UL (ref 0.05–1.2)
MONOCYTES NFR BLD: 8 % (ref 3–10)
NEUTS SEG # BLD: 2.3 K/UL (ref 1.8–8)
NEUTS SEG NFR BLD: 54 % (ref 40–73)
NITRITE UR QL STRIP.AUTO: POSITIVE
OPIATES UR QL: NEGATIVE
P-R INTERVAL, ECG05: 182 MS
P-R INTERVAL, ECG05: 184 MS
PCP UR QL: NEGATIVE
PH UR STRIP: 7 [PH] (ref 5–8)
PLATELET # BLD AUTO: 149 K/UL (ref 135–420)
PMV BLD AUTO: 9.1 FL (ref 9.2–11.8)
POTASSIUM SERPL-SCNC: 3.2 MMOL/L (ref 3.5–5.5)
PROT SERPL-MCNC: 8.1 G/DL (ref 6.4–8.2)
PROT UR STRIP-MCNC: NEGATIVE MG/DL
Q-T INTERVAL, ECG07: 444 MS
Q-T INTERVAL, ECG07: 452 MS
QRS DURATION, ECG06: 98 MS
QRS DURATION, ECG06: 98 MS
QTC CALCULATION (BEZET), ECG08: 482 MS
QTC CALCULATION (BEZET), ECG08: 494 MS
RBC # BLD AUTO: 3.7 M/UL (ref 4.2–5.3)
RBC #/AREA URNS HPF: ABNORMAL /HPF (ref 0–5)
SODIUM SERPL-SCNC: 138 MMOL/L (ref 136–145)
SP GR UR REFRACTOMETRY: 1.02 (ref 1–1.03)
TRICHOMONAS UR QL MICRO: ABNORMAL
TROPONIN I SERPL-MCNC: <0.02 NG/ML (ref 0–0.04)
TROPONIN I SERPL-MCNC: <0.02 NG/ML (ref 0–0.04)
UROBILINOGEN UR QL STRIP.AUTO: 1 EU/DL (ref 0.2–1)
VENTRICULAR RATE, ECG03: 71 BPM
VENTRICULAR RATE, ECG03: 72 BPM
WBC # BLD AUTO: 4.3 K/UL (ref 4.6–13.2)
WBC URNS QL MICRO: ABNORMAL /HPF (ref 0–5)

## 2021-09-06 PROCEDURE — 87040 BLOOD CULTURE FOR BACTERIA: CPT

## 2021-09-06 PROCEDURE — 74011250637 HC RX REV CODE- 250/637: Performed by: HOSPITALIST

## 2021-09-06 PROCEDURE — 87077 CULTURE AEROBIC IDENTIFY: CPT

## 2021-09-06 PROCEDURE — 99218 HC RM OBSERVATION: CPT

## 2021-09-06 PROCEDURE — 87086 URINE CULTURE/COLONY COUNT: CPT

## 2021-09-06 PROCEDURE — 87186 SC STD MICRODIL/AGAR DIL: CPT

## 2021-09-06 PROCEDURE — 99284 EMERGENCY DEPT VISIT MOD MDM: CPT

## 2021-09-06 PROCEDURE — 80307 DRUG TEST PRSMV CHEM ANLYZR: CPT

## 2021-09-06 PROCEDURE — 85025 COMPLETE CBC W/AUTO DIFF WBC: CPT

## 2021-09-06 PROCEDURE — 81001 URINALYSIS AUTO W/SCOPE: CPT

## 2021-09-06 PROCEDURE — 93005 ELECTROCARDIOGRAM TRACING: CPT

## 2021-09-06 PROCEDURE — 83880 ASSAY OF NATRIURETIC PEPTIDE: CPT

## 2021-09-06 PROCEDURE — 65270000029 HC RM PRIVATE

## 2021-09-06 PROCEDURE — 85652 RBC SED RATE AUTOMATED: CPT

## 2021-09-06 PROCEDURE — 74011250637 HC RX REV CODE- 250/637: Performed by: PHYSICIAN ASSISTANT

## 2021-09-06 PROCEDURE — 71046 X-RAY EXAM CHEST 2 VIEWS: CPT

## 2021-09-06 PROCEDURE — 84484 ASSAY OF TROPONIN QUANT: CPT

## 2021-09-06 PROCEDURE — 99223 1ST HOSP IP/OBS HIGH 75: CPT | Performed by: HOSPITALIST

## 2021-09-06 PROCEDURE — 80053 COMPREHEN METABOLIC PANEL: CPT

## 2021-09-06 PROCEDURE — 87153 DNA/RNA SEQUENCING: CPT

## 2021-09-06 PROCEDURE — 82553 CREATINE MB FRACTION: CPT

## 2021-09-06 PROCEDURE — 86140 C-REACTIVE PROTEIN: CPT

## 2021-09-06 RX ORDER — ASPIRIN 81 MG/1
81 TABLET ORAL DAILY
Status: DISCONTINUED | OUTPATIENT
Start: 2021-09-07 | End: 2021-09-07 | Stop reason: HOSPADM

## 2021-09-06 RX ORDER — FOLIC ACID 1 MG/1
1 TABLET ORAL DAILY
Status: DISCONTINUED | OUTPATIENT
Start: 2021-09-07 | End: 2021-09-07 | Stop reason: HOSPADM

## 2021-09-06 RX ORDER — SODIUM CHLORIDE 0.9 % (FLUSH) 0.9 %
5-40 SYRINGE (ML) INJECTION AS NEEDED
Status: DISCONTINUED | OUTPATIENT
Start: 2021-09-06 | End: 2021-09-07 | Stop reason: HOSPADM

## 2021-09-06 RX ORDER — METRONIDAZOLE 500 MG/1
2000 TABLET ORAL
Status: COMPLETED | OUTPATIENT
Start: 2021-09-06 | End: 2021-09-07

## 2021-09-06 RX ORDER — ATORVASTATIN CALCIUM 20 MG/1
20 TABLET, FILM COATED ORAL
Status: DISCONTINUED | OUTPATIENT
Start: 2021-09-06 | End: 2021-09-07 | Stop reason: HOSPADM

## 2021-09-06 RX ORDER — CEPHALEXIN 500 MG/1
500 CAPSULE ORAL 2 TIMES DAILY
Qty: 14 CAPSULE | Refills: 0 | Status: SHIPPED | OUTPATIENT
Start: 2021-09-06 | End: 2021-09-13

## 2021-09-06 RX ORDER — ENOXAPARIN SODIUM 100 MG/ML
40 INJECTION SUBCUTANEOUS DAILY
Status: DISCONTINUED | OUTPATIENT
Start: 2021-09-07 | End: 2021-09-07 | Stop reason: HOSPADM

## 2021-09-06 RX ORDER — THERA TABS 400 MCG
1 TAB ORAL DAILY
Status: DISCONTINUED | OUTPATIENT
Start: 2021-09-07 | End: 2021-09-07 | Stop reason: HOSPADM

## 2021-09-06 RX ORDER — POTASSIUM CHLORIDE 20 MEQ/1
20 TABLET, EXTENDED RELEASE ORAL
Status: COMPLETED | OUTPATIENT
Start: 2021-09-06 | End: 2021-09-06

## 2021-09-06 RX ORDER — ONDANSETRON 4 MG/1
4 TABLET, ORALLY DISINTEGRATING ORAL
Status: DISCONTINUED | OUTPATIENT
Start: 2021-09-06 | End: 2021-09-07 | Stop reason: HOSPADM

## 2021-09-06 RX ORDER — SODIUM CHLORIDE 0.9 % (FLUSH) 0.9 %
5-40 SYRINGE (ML) INJECTION EVERY 8 HOURS
Status: DISCONTINUED | OUTPATIENT
Start: 2021-09-06 | End: 2021-09-07 | Stop reason: HOSPADM

## 2021-09-06 RX ORDER — ACETAMINOPHEN 650 MG/1
650 SUPPOSITORY RECTAL
Status: DISCONTINUED | OUTPATIENT
Start: 2021-09-06 | End: 2021-09-07 | Stop reason: HOSPADM

## 2021-09-06 RX ORDER — POLYETHYLENE GLYCOL 3350 17 G/17G
17 POWDER, FOR SOLUTION ORAL DAILY PRN
Status: DISCONTINUED | OUTPATIENT
Start: 2021-09-06 | End: 2021-09-07 | Stop reason: HOSPADM

## 2021-09-06 RX ORDER — METHOCARBAMOL 500 MG/1
500 TABLET, FILM COATED ORAL 3 TIMES DAILY
Qty: 15 TABLET | Refills: 0 | Status: SHIPPED | OUTPATIENT
Start: 2021-09-06

## 2021-09-06 RX ORDER — ACETAMINOPHEN 325 MG/1
650 TABLET ORAL
Status: DISCONTINUED | OUTPATIENT
Start: 2021-09-06 | End: 2021-09-07 | Stop reason: HOSPADM

## 2021-09-06 RX ORDER — HYDROCODONE BITARTRATE AND ACETAMINOPHEN 5; 325 MG/1; MG/1
1-2 TABLET ORAL
Status: DISCONTINUED | OUTPATIENT
Start: 2021-09-06 | End: 2021-09-07 | Stop reason: HOSPADM

## 2021-09-06 RX ORDER — AMLODIPINE BESYLATE 5 MG/1
2.5 TABLET ORAL DAILY
Status: DISCONTINUED | OUTPATIENT
Start: 2021-09-07 | End: 2021-09-07 | Stop reason: HOSPADM

## 2021-09-06 RX ORDER — LANOLIN ALCOHOL/MO/W.PET/CERES
100 CREAM (GRAM) TOPICAL DAILY
Status: DISCONTINUED | OUTPATIENT
Start: 2021-09-07 | End: 2021-09-07 | Stop reason: HOSPADM

## 2021-09-06 RX ORDER — ONDANSETRON 2 MG/ML
4 INJECTION INTRAMUSCULAR; INTRAVENOUS
Status: DISCONTINUED | OUTPATIENT
Start: 2021-09-06 | End: 2021-09-07 | Stop reason: HOSPADM

## 2021-09-06 RX ORDER — METRONIDAZOLE 500 MG/1
2000 TABLET ORAL
Qty: 4 TABLET | Refills: 0 | Status: SHIPPED | OUTPATIENT
Start: 2021-09-06 | End: 2021-09-06

## 2021-09-06 RX ADMIN — ACETAMINOPHEN 650 MG: 325 TABLET ORAL at 22:45

## 2021-09-06 RX ADMIN — POTASSIUM BICARBONATE 20 MEQ: 391 TABLET, EFFERVESCENT ORAL at 22:45

## 2021-09-06 RX ADMIN — ATORVASTATIN CALCIUM 20 MG: 20 TABLET, FILM COATED ORAL at 22:44

## 2021-09-06 RX ADMIN — POTASSIUM CHLORIDE 20 MEQ: 1500 TABLET, EXTENDED RELEASE ORAL at 18:05

## 2021-09-06 NOTE — ED NOTES
Chest pain x 3 days. I performed a brief evaluation, including history and physical, of the patient here in triage and I have determined that pt will need further treatment and evaluation from the main side ER physician. I have placed initial orders to help in expediting patients care.      September 06, 2021 at 11:30 AM - ARCHIE Li

## 2021-09-06 NOTE — ED PROVIDER NOTES
EMERGENCY DEPARTMENT HISTORY AND PHYSICAL EXAM      Date: 9/6/2021  Patient Name: Stewart Sanchez    History of Presenting Illness     Chief Complaint   Patient presents with    Chest Pain    Generalized Body Aches       History Provided By: Patient    HPI: Stewart Sanchez, 60 y.o. female PMHx significant for htn, IVDU presents ambulatory to the ED. Patient reports intermittent nonardiating aching and tightness to chest x 3 days. Patient reports symptoms are made worse with movement. Patient denies known trauma or injury. Denies recent heavy lifting. Patient admits to recent IV drug use. Denies fever/chills, rash. Denies cardiac history. Denies  pleuritic chest pain. Patient has not taken anything for symptoms. There are no other complaints, changes, or physical findings at this time. PCP: Payal Waller MD    No current facility-administered medications on file prior to encounter. Current Outpatient Medications on File Prior to Encounter   Medication Sig Dispense Refill    naloxone (Narcan) 4 mg/actuation nasal spray Use 1 spray intranasally, then discard. Repeat with new spray every 2 min as needed for opioid overdose symptoms, alternating nostrils. 2 Each 0    aspirin delayed-release 81 mg tablet Take 1 Tab by mouth daily. 30 Tab 0    atorvastatin (LIPITOR) 20 mg tablet Take 1 Tab by mouth nightly. 30 Tab 0    folic acid (FOLVITE) 1 mg tablet Take 1 Tab by mouth daily. 30 Tab 0    therapeutic multivitamin (THERAGRAN) tablet Take 1 Tab by mouth daily. 30 Tab 0    thiamine (B-1) 100 mg tablet Take 1 Tab by mouth daily. 30 Tab 0    amLODIPine (NORVASC) 5 mg tablet Take 0.5 Tabs by mouth daily. Indications: hypertension 30 Tab 0       Past History     Past Medical History:  Past Medical History:   Diagnosis Date    Hypertension        Past Surgical History:  History reviewed. No pertinent surgical history. Family History:  History reviewed. No pertinent family history.     Social History:  Social History     Tobacco Use    Smoking status: Never Smoker    Smokeless tobacco: Never Used   Substance Use Topics    Alcohol use: Yes     Comment: occational beer    Drug use: No       Allergies:  No Known Allergies      Review of Systems   Review of Systems   Constitutional: Negative for chills and fever. Respiratory: Negative for shortness of breath. Cardiovascular: Positive for chest pain. Gastrointestinal: Negative for abdominal pain, nausea and vomiting. Genitourinary: Negative for flank pain. Musculoskeletal: Negative for back pain and myalgias. Skin: Negative for color change, pallor, rash and wound. Neurological: Negative for dizziness, weakness and light-headedness. All other systems reviewed and are negative. Physical Exam   Physical Exam  Vitals and nursing note reviewed. Constitutional:       General: She is not in acute distress. Appearance: She is well-developed. Comments: Pt in NAD   HENT:      Head: Normocephalic and atraumatic. Eyes:      Conjunctiva/sclera: Conjunctivae normal.   Cardiovascular:      Rate and Rhythm: Normal rate and regular rhythm. Heart sounds: Normal heart sounds. Comments: No murmurs rubs or gallops  No lower leg sign bilaterally  Pulmonary:      Effort: Pulmonary effort is normal. No respiratory distress. Breath sounds: Normal breath sounds. Comments: Lungs CTA  Not working to breathe  Chest:       Abdominal:      General: Bowel sounds are normal. There is no distension. Palpations: Abdomen is soft. Musculoskeletal:         General: Normal range of motion. Skin:     General: Skin is warm. Findings: No rash. Neurological:      Mental Status: She is alert and oriented to person, place, and time.    Psychiatric:         Behavior: Behavior normal.         Diagnostic Study Results     Labs -     Recent Results (from the past 12 hour(s))   EKG, 12 LEAD, INITIAL    Collection Time: 09/06/21 11:05 AM   Result Value Ref Range    Ventricular Rate 71 BPM    Atrial Rate 71 BPM    P-R Interval 184 ms    QRS Duration 98 ms    Q-T Interval 444 ms    QTC Calculation (Bezet) 482 ms    Calculated P Axis 65 degrees    Calculated R Axis 63 degrees    Calculated T Axis 76 degrees    Diagnosis       Normal sinus rhythm  Moderate voltage criteria for LVH, may be normal variant ( Sokolow-Fry ,   [place] product )  Septal infarct (cited on or before 25-OCT-2017)  Abnormal ECG  When compared with ECG of 04-SEP-2021 20:33,  QT has shortened  Confirmed by Patricia Queen [id number]) on 9/6/2021 11:18:22 AM     URINALYSIS W/ RFLX MICROSCOPIC    Collection Time: 09/06/21  2:31 PM   Result Value Ref Range    Color YELLOW      Appearance CLOUDY      Specific gravity 1.020 1.005 - 1.030      pH (UA) 7.0 5.0 - 8.0      Protein Negative NEG mg/dL    Glucose Negative NEG mg/dL    Ketone TRACE (A) NEG mg/dL    Bilirubin Negative NEG      Blood TRACE (A) NEG      Urobilinogen 1.0 0.2 - 1.0 EU/dL    Nitrites Positive (A) NEG      Leukocyte Esterase LARGE (A) NEG     DRUG SCREEN, URINE    Collection Time: 09/06/21  2:31 PM   Result Value Ref Range    BENZODIAZEPINES Negative NEG      BARBITURATES Negative NEG      THC (TH-CANNABINOL) Negative NEG      OPIATES Negative NEG      PCP(PHENCYCLIDINE) Negative NEG      COCAINE Positive (A) NEG      AMPHETAMINES Negative NEG      METHADONE Negative NEG      HDSCOM (NOTE)    URINE MICROSCOPIC ONLY    Collection Time: 09/06/21  2:31 PM   Result Value Ref Range    WBC 21 to 35 0 - 5 /hpf    RBC 0 to 3 0 - 5 /hpf    Epithelial cells FEW 0 - 5 /lpf    Bacteria 4+ (A) NEG /hpf    Trichomonas FEW (A) NEG     CBC WITH AUTOMATED DIFF    Collection Time: 09/06/21  2:45 PM   Result Value Ref Range    WBC 4.3 (L) 4.6 - 13.2 K/uL    RBC 3.70 (L) 4.20 - 5.30 M/uL    HGB 13.0 12.0 - 16.0 g/dL    HCT 37.4 35.0 - 45.0 %    .1 (H) 78.0 - 100.0 FL    MCH 35.1 (H) 24.0 - 34.0 PG    MCHC 34.8 31.0 - 37.0 g/dL    RDW 12.4 11.6 - 14.5 %    PLATELET 510 117 - 564 K/uL    MPV 9.1 (L) 9.2 - 11.8 FL    NEUTROPHILS 54 40 - 73 %    LYMPHOCYTES 36 21 - 52 %    MONOCYTES 8 3 - 10 %    EOSINOPHILS 2 0 - 5 %    BASOPHILS 0 0 - 2 %    ABS. NEUTROPHILS 2.3 1.8 - 8.0 K/UL    ABS. LYMPHOCYTES 1.5 0.9 - 3.6 K/UL    ABS. MONOCYTES 0.3 0.05 - 1.2 K/UL    ABS. EOSINOPHILS 0.1 0.0 - 0.4 K/UL    ABS. BASOPHILS 0.0 0.0 - 0.1 K/UL    DF AUTOMATED     METABOLIC PANEL, COMPREHENSIVE    Collection Time: 09/06/21  2:45 PM   Result Value Ref Range    Sodium 138 136 - 145 mmol/L    Potassium 3.2 (L) 3.5 - 5.5 mmol/L    Chloride 102 100 - 111 mmol/L    CO2 33 (H) 21 - 32 mmol/L    Anion gap 3 3.0 - 18 mmol/L    Glucose 81 74 - 99 mg/dL    BUN 10 7.0 - 18 MG/DL    Creatinine 0.57 (L) 0.6 - 1.3 MG/DL    BUN/Creatinine ratio 18 12 - 20      GFR est AA >60 >60 ml/min/1.73m2    GFR est non-AA >60 >60 ml/min/1.73m2    Calcium 8.6 8.5 - 10.1 MG/DL    Bilirubin, total 1.0 0.2 - 1.0 MG/DL    ALT (SGPT) 22 13 - 56 U/L    AST (SGOT) 31 10 - 38 U/L    Alk.  phosphatase 75 45 - 117 U/L    Protein, total 8.1 6.4 - 8.2 g/dL    Albumin 4.1 3.4 - 5.0 g/dL    Globulin 4.0 2.0 - 4.0 g/dL    A-G Ratio 1.0 0.8 - 1.7     TROPONIN I    Collection Time: 09/06/21  2:45 PM   Result Value Ref Range    Troponin-I, QT <0.02 0.0 - 0.045 NG/ML   NT-PRO BNP    Collection Time: 09/06/21  2:45 PM   Result Value Ref Range    NT pro- 0 - 900 PG/ML   SED RATE (ESR)    Collection Time: 09/06/21  2:45 PM   Result Value Ref Range    Sed rate, automated 19 0 - 30 mm/hr   C REACTIVE PROTEIN, QT    Collection Time: 09/06/21  2:45 PM   Result Value Ref Range    C-Reactive protein 1.5 (H) 0 - 0.3 mg/dL   CARDIAC PANEL,(CK, CKMB & TROPONIN)    Collection Time: 09/06/21  5:18 PM   Result Value Ref Range    CK - MB 1.7 <3.6 ng/ml    CK-MB Index 0.5 0.0 - 4.0 %     (H) 26 - 192 U/L    Troponin-I, QT <0.02 0.0 - 0.045 NG/ML   EKG, 12 LEAD, SUBSEQUENT    Collection Time: 09/06/21  5:26 PM Result Value Ref Range    Ventricular Rate 72 BPM    Atrial Rate 72 BPM    P-R Interval 182 ms    QRS Duration 98 ms    Q-T Interval 452 ms    QTC Calculation (Bezet) 494 ms    Calculated P Axis 58 degrees    Calculated R Axis 58 degrees    Calculated T Axis 78 degrees    Diagnosis       Normal sinus rhythm  Moderate voltage criteria for LVH, may be normal variant ( Sokolow-Fry ,   [place] product )  Septal infarct (cited on or before 25-OCT-2017)  Abnormal ECG  When compared with ECG of 06-SEP-2021 11:05,  No significant change was found         Radiologic Studies -   XR CHEST PA LAT    (Results Pending)     CT Results  (Last 48 hours)    None        CXR Results  (Last 48 hours)    None          Medical Decision Making   I am the first provider for this patient. I reviewed the vital signs, available nursing notes, past medical history, past surgical history, family history and social history. Vital Signs-Reviewed the patient's vital signs. Patient Vitals for the past 12 hrs:   Temp Pulse Resp BP SpO2   09/06/21 1131 98 °F (36.7 °C) 68 18 (!) 142/102 100 %         EKG interpretation: (Preliminary)  Rhythm: normal sinus rhythm; and regular . Rate (approx.): 71; Axis: normal; PA interval: normal; QRS interval: normal ; ST/T wave: non-specific changes. No acute ischemic changes. Records Reviewed: Nursing Notes, Old Medical Records and Previous electrocardiograms    Provider Notes (Medical Decision Making):   DDx: ACS, PNA, CHF, Endocarditis    61 yo F who presents with intermittent chest tightness x 3 days. Sx made worse with movement. On exam, lungs CTA and not working to breathe. No murmurs, rubs or gallops. EKG and subsequent EKG are changed from EKG 2 days ago. Negative troponin x 2. Leukopenia without left shift or bandemia. Patient afebrile and not tachycardic. Blood culture sent to evaluate for endocarditis.   Spoke with cardiology who recommended aspirin with admission for further evaluation from cardiology. Patient admitted. ED Course:   Initial assessment performed. The patients presenting problems have been discussed, and they are in agreement with the care plan formulated and outlined with them. I have encouraged them to ask questions as they arise throughout their visit. Discussed with attending Dr Myesha Herrera, attending. Discussed pt's extensive hx of IV drug use with recent overdose. Pt is well-appearing, afebrile and not tachycardic. Essentially normal labs. EKG shows new st changes that are changed from most recent EKG 2 days ago. He recommended repeat EKG and troponin. 1745: Spoke with Dr Tari Landin, consult cardiology. Discussed patient's ST changes with extensive past medical history. He recommended calcium channel blocker and aspirin and admit for further evaluation. 1805: Spoke with Dr Kush Rosario, consult hospitalist.  Discussed patient's chest pain, new EKG changes, IV drug use and previous cardiac history. Discussed cardiology's recommendations. She agreed treatment patient inpatient hospital services. Disposition:  Admitted    PLAN:  1. Current Discharge Medication List      START taking these medications    Details   cephALEXin (Keflex) 500 mg capsule Take 1 Capsule by mouth two (2) times a day for 7 days. Qty: 14 Capsule, Refills: 0  Start date: 9/6/2021, End date: 9/13/2021      metroNIDAZOLE (FlagyL) 500 mg tablet Take 4 Tablets by mouth now for 1 dose. Qty: 4 Tablet, Refills: 0  Start date: 9/6/2021, End date: 9/6/2021      methocarbamoL (Robaxin) 500 mg tablet Take 1 Tablet by mouth three (3) times daily. Qty: 15 Tablet, Refills: 0  Start date: 9/6/2021           2. Follow-up Information    None       Return to ED if worse     Diagnosis     Clinical Impression:   1. Chest pain, unspecified type    2. Acute cystitis without hematuria    3.  Trichimoniasis        Attestations:    ARCHIE Thomas    Please note that this dictation was completed with Dragon, the computer voice recognition software. Quite often unanticipated grammatical, syntax, homophones, and other interpretive errors are inadvertently transcribed by the computer software. Please disregard these errors. Please excuse any errors that have escaped final proofreading. Thank you.

## 2021-09-06 NOTE — H&P
History & Physical    Patient: Henri Parker MRN: [id number]  CSN: [id number]    YOB: 1965  Age: 60 y.o. Sex: female      DOA: 9/6/2021    Chief Complaint   Patient presents with    Chest Pain    Generalized Body Aches          HPI:     Henri Parker is a 60 y.o. female with past medical history of drug use, transient atrial fibrillation, who was discharged from the ED 2 days ago after treatment of heroin overdose with Narcan, with subsequent improvement in her respiratory and mental status. Patient presented back to the ED today with chest pressure. She was noted to have some ST-T changes on her EKG, but not a STEMI. Troponin was negative x2. She was also noted with evidence of UTI, with pyuria, leukocyte esterase, nitrites. Also few trichomonas on UA. Chest x-ray is pending at the time of this dictation. in the ED, she received potassium 20 mEq p.o. x1. Patient seen and examined in the hallway, as there was no room in the ED available. She relates that 3 days ago she started having chest pressure, left-sided, nonradiating, \"sore and tight. \"  Pain has been intermittent, rated 8 out of 10, presently 6 out of 10. She does not know what made it better, denies aggravating factors. She denies associated shortness of breath, nausea vomiting, diaphoresis. She states she has not been getting more short winded with her usual activities. Patient also notes dysuria, frequency, hematuria, but denies urgency. She denies recent weight loss. Case has been discussed with cardiology by the PA in the ED. Past Medical History:   Diagnosis Date    Hypertension        Surgical history  Patient denies    History reviewed. No pertinent family history.     Social History     Socioeconomic History    Marital status: SINGLE     Spouse name: Not on file    Number of children: Not on file    Years of education: Not on file    Highest education level: Not on file   Tobacco Use    Smoking status: Never Smoker    Smokeless tobacco: Never Used   Substance and Sexual Activity    Alcohol use: Yes     Comment: occational beer    Drug use: No     Social Determinants of Health     Financial Resource Strain:     Difficulty of Paying Living Expenses:    Food Insecurity:     Worried About Running Out of Food in the Last Year:     [address] in the Last Year:    Transportation Needs:     Lack of Transportation (Medical):  Lack of Transportation (Non-Medical):    Physical Activity:     Days of Exercise per Week:     Minutes of Exercise per Session:    Stress:     Feeling of Stress :    Social Connections:     Frequency of Communication with Friends and Family:     Frequency of Social Gatherings with Friends and Family:     Attends Gn[employer] Services:     Active Member of Clubs or Organizations:     Attends Club or Organization Meetings:     Marital Status:        Prior to Admission medications    Medication Sig Start Date End Date Taking? Authorizing Provider   cephALEXin (Keflex) 500 mg capsule Take 1 Capsule by mouth two (2) times a day for 7 days. 9/6/21 9/13/21 Yes ARCHIE Bear   metroNIDAZOLE (FlagyL) 500 mg tablet Take 4 Tablets by mouth now for 1 dose. 9/6/21 9/6/21 Yes ARCHIE Bear   methocarbamoL (Robaxin) 500 mg tablet Take 1 Tablet by mouth three (3) times daily. 9/6/21  Yes ARCHIE Bear   naloxone (Narcan) 4 mg/actuation nasal spray Use 1 spray intranasally, then discard. Repeat with new spray every 2 min as needed for opioid overdose symptoms, alternating nostrils. 9/4/21   ARCHIE Schultz   aspirin delayed-release 81 mg tablet Take 1 Tab by mouth daily. 10/27/17   Guillermina Sawyer NP   atorvastatin (LIPITOR) 20 mg tablet Take 1 Tab by mouth nightly. 10/26/17   Guillermina Sawyer NP   folic acid (FOLVITE) 1 mg tablet Take 1 Tab by mouth daily.  10/27/17   Guillermina Sawyer NP   therapeutic multivitamin [facility]) tablet Take 1 Tab by mouth daily. 10/27/17   Napolean Baljinder, NP   thiamine (B-1) 100 mg tablet Take 1 Tab by mouth daily. 10/27/17   Napolean Baljinder, NP   amLODIPine (NORVASC) 5 mg tablet Take 0.5 Tabs by mouth daily. Indications: hypertension 10/27/17   Mariana Kim ROBERTO, NP       No Known Allergies    Review of Systems  GENERAL: No fevers or chills. HEENT: No change in vision, no earache, sore throat or sinus congestion. NECK: No pain or stiffness. CARDIOVASCULAR: +chest pressure. No palpitations. PULMONARY: No shortness of breath, cough or wheeze. GASTROINTESTINAL: No abdominal pain, nausea, vomiting or diarrhea, melena or       bright red blood per rectum. GENITOURINARY: + urinary frequency, dysuria. No urgency. MUSCULOSKELETAL: No joint or muscle pain, no back pain, no recent trauma. DERMATOLOGIC: No rash, no itching, no lesions. ENDOCRINE: No polyuria, polydipsia, no heat or cold intolerance. No recent change in    weight. HEMATOLOGICAL: No anemia or easy bruising or bleeding. NEUROLOGIC: No headache, seizures, numbness, tingling or weakness. PSYCHIATRIC: No depression, anxiety, mood disorder, no loss of interest in normal       activity or change in sleep pattern. Physical Exam:     Physical Exam:  Visit Vitals  BP (!) 142/102 (BP 1 Location: Left upper arm, BP Patient Position: At rest)   Pulse 68   Temp 98 °F (36.7 °C)   Resp 18   Ht 5' 4\" (1.626 m)   Wt 52.2 kg (115 lb)   SpO2 100%   BMI 19.74 kg/m²      O2 Device: None (Room air)    Temp (24hrs), Av °F (36.7 °C), Min:98 °F (36.7 °C), Max:98 °F (36.7 °C)       No intake/output data recorded. No intake/output data recorded. Awake alert and oriented x4. Speaking in full sentences on room air. Thin body habitus. Normocephalic atraumatic pupils equally round and reactive to light. Wearing mask  Regular rate and rhythm  Clear to auscultation bilaterally  Abdomen soft nontender nondistended normoactive bowel sounds  No edema.   DP 2+ bilaterally  No focal deficit. Observed ambulating independently without difficulty. No rash to visible skin.       Labs Reviewed:    Recent Results (from the past 24 hour(s))   EKG, 12 LEAD, INITIAL    Collection Time: 09/06/21 11:05 AM   Result Value Ref Range    Ventricular Rate 71 BPM    Atrial Rate 71 BPM    P-R Interval 184 ms    QRS Duration 98 ms    Q-T Interval 444 ms    QTC Calculation (Bezet) 482 ms    Calculated P Axis 65 degrees    Calculated R Axis 63 degrees    Calculated T Axis 76 degrees    Diagnosis       Normal sinus rhythm  Moderate voltage criteria for LVH, may be normal variant ( Sokolow-Fry ,   [place] product )  Septal infarct (cited on or before 25-OCT-2017)  Abnormal ECG  When compared with ECG of 04-SEP-2021 20:33,  QT has shortened  Confirmed by Delia Santiago ([id number]) on 9/6/2021 11:18:22 AM     URINALYSIS W/ RFLX MICROSCOPIC    Collection Time: 09/06/21  2:31 PM   Result Value Ref Range    Color YELLOW      Appearance CLOUDY      Specific gravity 1.020 1.005 - 1.030      pH (UA) 7.0 5.0 - 8.0      Protein Negative NEG mg/dL    Glucose Negative NEG mg/dL    Ketone TRACE (A) NEG mg/dL    Bilirubin Negative NEG      Blood TRACE (A) NEG      Urobilinogen 1.0 0.2 - 1.0 EU/dL    Nitrites Positive (A) NEG      Leukocyte Esterase LARGE (A) NEG     DRUG SCREEN, URINE    Collection Time: 09/06/21  2:31 PM   Result Value Ref Range    BENZODIAZEPINES Negative NEG      BARBITURATES Negative NEG      THC (TH-CANNABINOL) Negative NEG      OPIATES Negative NEG      PCP(PHENCYCLIDINE) Negative NEG      COCAINE Positive (A) NEG      AMPHETAMINES Negative NEG      METHADONE Negative NEG      HDSCOM (NOTE)    URINE MICROSCOPIC ONLY    Collection Time: 09/06/21  2:31 PM   Result Value Ref Range    WBC 21 to 35 0 - 5 /hpf    RBC 0 to 3 0 - 5 /hpf    Epithelial cells FEW 0 - 5 /lpf    Bacteria 4+ (A) NEG /hpf    Trichomonas FEW (A) NEG     CBC WITH AUTOMATED DIFF    Collection Time: 09/06/21  2:45 PM   Result Value Ref Range    WBC 4.3 (L) 4.6 - 13.2 K/uL    RBC 3.70 (L) 4.20 - 5.30 M/uL    HGB 13.0 12.0 - 16.0 g/dL    HCT 37.4 35.0 - 45.0 %    .1 (H) 78.0 - 100.0 FL    MCH 35.1 (H) 24.0 - 34.0 PG    MCHC 34.8 31.0 - 37.0 g/dL    RDW 12.4 11.6 - 14.5 %    PLATELET 756 585 - 002 K/uL    MPV 9.1 (L) 9.2 - 11.8 FL    NEUTROPHILS 54 40 - 73 %    LYMPHOCYTES 36 21 - 52 %    MONOCYTES 8 3 - 10 %    EOSINOPHILS 2 0 - 5 %    BASOPHILS 0 0 - 2 %    ABS. NEUTROPHILS 2.3 1.8 - 8.0 K/UL    ABS. LYMPHOCYTES 1.5 0.9 - 3.6 K/UL    ABS. MONOCYTES 0.3 0.05 - 1.2 K/UL    ABS. EOSINOPHILS 0.1 0.0 - 0.4 K/UL    ABS. BASOPHILS 0.0 0.0 - 0.1 K/UL    DF AUTOMATED     METABOLIC PANEL, COMPREHENSIVE    Collection Time: 09/06/21  2:45 PM   Result Value Ref Range    Sodium 138 136 - 145 mmol/L    Potassium 3.2 (L) 3.5 - 5.5 mmol/L    Chloride 102 100 - 111 mmol/L    CO2 33 (H) 21 - 32 mmol/L    Anion gap 3 3.0 - 18 mmol/L    Glucose 81 74 - 99 mg/dL    BUN 10 7.0 - 18 MG/DL    Creatinine 0.57 (L) 0.6 - 1.3 MG/DL    BUN/Creatinine ratio 18 12 - 20      GFR est AA >60 >60 ml/min/1.73m2    GFR est non-AA >60 >60 ml/min/1.73m2    Calcium 8.6 8.5 - 10.1 MG/DL    Bilirubin, total 1.0 0.2 - 1.0 MG/DL    ALT (SGPT) 22 13 - 56 U/L    AST (SGOT) 31 10 - 38 U/L    Alk.  phosphatase 75 45 - 117 U/L    Protein, total 8.1 6.4 - 8.2 g/dL    Albumin 4.1 3.4 - 5.0 g/dL    Globulin 4.0 2.0 - 4.0 g/dL    A-G Ratio 1.0 0.8 - 1.7     TROPONIN I    Collection Time: 09/06/21  2:45 PM   Result Value Ref Range    Troponin-I, QT <0.02 0.0 - 0.045 NG/ML   NT-PRO BNP    Collection Time: 09/06/21  2:45 PM   Result Value Ref Range    NT pro- 0 - 900 PG/ML   SED RATE (ESR)    Collection Time: 09/06/21  2:45 PM   Result Value Ref Range    Sed rate, automated 19 0 - 30 mm/hr   C REACTIVE PROTEIN, QT    Collection Time: 09/06/21  2:45 PM   Result Value Ref Range    C-Reactive protein 1.5 (H) 0 - 0.3 mg/dL   CARDIAC PANEL,(CK, CKMB & TROPONIN)    Collection Time: 09/06/21  5:18 PM   Result Value Ref Range    CK - MB 1.7 <3.6 ng/ml    CK-MB Index 0.5 0.0 - 4.0 %     (H) 26 - 192 U/L    Troponin-I, QT <0.02 0.0 - 0.045 NG/ML   EKG, 12 LEAD, SUBSEQUENT    Collection Time: 09/06/21  5:26 PM   Result Value Ref Range    Ventricular Rate 72 BPM    Atrial Rate 72 BPM    P-R Interval 182 ms    QRS Duration 98 ms    Q-T Interval 452 ms    QTC Calculation (Bezet) 494 ms    Calculated P Axis 58 degrees    Calculated R Axis 58 degrees    Calculated T Axis 78 degrees    Diagnosis       Normal sinus rhythm  Moderate voltage criteria for LVH, may be normal variant ( Sokolow-Fry ,   [place] product )  Septal infarct (cited on or before 25-OCT-2017)  Abnormal ECG  When compared with ECG of 06-SEP-2021 11:05,  No significant change was found         Procedures/imaging: see electronic medical records for all procedures/Xrays and details which were not copied into this note but were reviewed prior to creation of Plan        Assessment/Plan     1. Chest pressure. EKG with ST changes, no STEMI. Troponin negative x2, repeat tomorrow morning. Aspirin, calcium channel blocker. N.p.o. after midnight pending cardiology input. I discussed the case with Dr. Ciro Mcmanus. 2.  Urine drug screen + cocaine. Avoid beta blockade. 3.  Urinary tract infection. Ceftriaxone. Please follow urine culture. 4.  Trichomoniasis. Flagyl 2 g p.o. x1 now. 5.  Heroin overdose note 421, discharged from ED after receiving Narcan, noted with improvement in breathing and mental status. 6.  Atrial fibrillation, brief episode in July as well as when admitted for alcohol abuse in 2017. She went back to sinus, mostly bradycardic, not discharged on rate control medications. Discharged on aspirin. 7.  Echo 7/31/2021 EF 50 to 55% mildly increased wall thickness, wall motion normal.  Mild AR, mild AR. RVSP 23 mmHg  8. Slightly elevated CK. Repeat in a.m.  9.  Thin body habitus. Consult nutritionist  10. History of alcohol use disorder, she denies current use. Multivitamin, thiamine, folic acid. 11. DVT prophylaxis  12. Full code. Admit to telemetry. Time spent 70 minutes.       Antolin Edward MD  September 6, 2021

## 2021-09-07 VITALS
DIASTOLIC BLOOD PRESSURE: 100 MMHG | HEART RATE: 81 BPM | WEIGHT: 115 LBS | SYSTOLIC BLOOD PRESSURE: 121 MMHG | BODY MASS INDEX: 19.63 KG/M2 | OXYGEN SATURATION: 100 % | TEMPERATURE: 98.4 F | RESPIRATION RATE: 16 BRPM | HEIGHT: 64 IN

## 2021-09-07 LAB
ANION GAP SERPL CALC-SCNC: 2 MMOL/L (ref 3–18)
BASOPHILS # BLD: 0 K/UL (ref 0–0.1)
BASOPHILS NFR BLD: 0 % (ref 0–2)
BUN SERPL-MCNC: 11 MG/DL (ref 7–18)
BUN/CREAT SERPL: 19 (ref 12–20)
CALCIUM SERPL-MCNC: 8.4 MG/DL (ref 8.5–10.1)
CHLORIDE SERPL-SCNC: 108 MMOL/L (ref 100–111)
CK SERPL-CCNC: 212 U/L (ref 26–192)
CO2 SERPL-SCNC: 31 MMOL/L (ref 21–32)
CREAT SERPL-MCNC: 0.58 MG/DL (ref 0.6–1.3)
DIFFERENTIAL METHOD BLD: ABNORMAL
EOSINOPHIL # BLD: 0.1 K/UL (ref 0–0.4)
EOSINOPHIL NFR BLD: 3 % (ref 0–5)
ERYTHROCYTE [DISTWIDTH] IN BLOOD BY AUTOMATED COUNT: 12.5 % (ref 11.6–14.5)
GLUCOSE SERPL-MCNC: 98 MG/DL (ref 74–99)
HCT VFR BLD AUTO: 32.8 % (ref 35–45)
HGB BLD-MCNC: 11.3 G/DL (ref 12–16)
INR PPP: 1 (ref 0.8–1.2)
LYMPHOCYTES # BLD: 1.4 K/UL (ref 0.9–3.6)
LYMPHOCYTES NFR BLD: 42 % (ref 21–52)
MAGNESIUM SERPL-MCNC: 1.8 MG/DL (ref 1.6–2.6)
MCH RBC QN AUTO: 34.5 PG (ref 24–34)
MCHC RBC AUTO-ENTMCNC: 34.5 G/DL (ref 31–37)
MCV RBC AUTO: 100 FL (ref 78–100)
MONOCYTES # BLD: 0.4 K/UL (ref 0.05–1.2)
MONOCYTES NFR BLD: 11 % (ref 3–10)
NEUTS SEG # BLD: 1.4 K/UL (ref 1.8–8)
NEUTS SEG NFR BLD: 44 % (ref 40–73)
PLATELET # BLD AUTO: 149 K/UL (ref 135–420)
PMV BLD AUTO: 9.3 FL (ref 9.2–11.8)
POTASSIUM SERPL-SCNC: 3.7 MMOL/L (ref 3.5–5.5)
PROTHROMBIN TIME: 12.7 SEC (ref 11.5–15.2)
RBC # BLD AUTO: 3.28 M/UL (ref 4.2–5.3)
SODIUM SERPL-SCNC: 141 MMOL/L (ref 136–145)
TROPONIN I SERPL-MCNC: <0.02 NG/ML (ref 0–0.04)
WBC # BLD AUTO: 3.3 K/UL (ref 4.6–13.2)

## 2021-09-07 PROCEDURE — 74011250636 HC RX REV CODE- 250/636: Performed by: HOSPITALIST

## 2021-09-07 PROCEDURE — 99218 HC RM OBSERVATION: CPT

## 2021-09-07 PROCEDURE — 96374 THER/PROPH/DIAG INJ IV PUSH: CPT

## 2021-09-07 PROCEDURE — 99217 PR OBSERVATION CARE DISCHARGE MANAGEMENT: CPT | Performed by: HOSPITALIST

## 2021-09-07 PROCEDURE — 84484 ASSAY OF TROPONIN QUANT: CPT

## 2021-09-07 PROCEDURE — 83735 ASSAY OF MAGNESIUM: CPT

## 2021-09-07 PROCEDURE — 74011250637 HC RX REV CODE- 250/637: Performed by: HOSPITALIST

## 2021-09-07 PROCEDURE — 85610 PROTHROMBIN TIME: CPT

## 2021-09-07 PROCEDURE — 99215 OFFICE O/P EST HI 40 MIN: CPT | Performed by: PHYSICIAN ASSISTANT

## 2021-09-07 PROCEDURE — 74011250637 HC RX REV CODE- 250/637: Performed by: FAMILY MEDICINE

## 2021-09-07 PROCEDURE — 82550 ASSAY OF CK (CPK): CPT

## 2021-09-07 PROCEDURE — 74011000250 HC RX REV CODE- 250: Performed by: HOSPITALIST

## 2021-09-07 PROCEDURE — 80048 BASIC METABOLIC PNL TOTAL CA: CPT

## 2021-09-07 PROCEDURE — 85025 COMPLETE CBC W/AUTO DIFF WBC: CPT

## 2021-09-07 RX ORDER — AMLODIPINE BESYLATE 5 MG/1
2.5 TABLET ORAL DAILY
Qty: 30 TABLET | Refills: 0 | Status: SHIPPED | OUTPATIENT
Start: 2021-09-07

## 2021-09-07 RX ORDER — NITROGLYCERIN 0.4 MG/1
0.4 TABLET SUBLINGUAL
Qty: 25 TABLET | Refills: 0 | Status: SHIPPED | OUTPATIENT
Start: 2021-09-07

## 2021-09-07 RX ADMIN — THERA TABS 1 TABLET: TAB at 11:02

## 2021-09-07 RX ADMIN — AMLODIPINE BESYLATE 2.5 MG: 5 TABLET ORAL at 11:00

## 2021-09-07 RX ADMIN — HYDROCODONE BITARTRATE AND ACETAMINOPHEN 2 TABLET: 5; 325 TABLET ORAL at 00:00

## 2021-09-07 RX ADMIN — METRONIDAZOLE 2000 MG: 500 TABLET ORAL at 00:06

## 2021-09-07 RX ADMIN — HYDROCODONE BITARTRATE AND ACETAMINOPHEN 1 TABLET: 5; 325 TABLET ORAL at 11:02

## 2021-09-07 RX ADMIN — Medication 10 ML: at 00:11

## 2021-09-07 RX ADMIN — Medication 81 MG: at 10:59

## 2021-09-07 RX ADMIN — WATER 1 G: 1 INJECTION INTRAMUSCULAR; INTRAVENOUS; SUBCUTANEOUS at 00:08

## 2021-09-07 RX ADMIN — Medication 100 MG: at 11:00

## 2021-09-07 RX ADMIN — FOLIC ACID 1 MG: 1 TABLET ORAL at 11:00

## 2021-09-07 NOTE — ED NOTES
6:47 PM lab called with positive blood culture results. The pt was just discharged home from the hospital today. I have spoken with hospitalist Dr. Goyo Mills who agrees with the plan to have the pt return to the hospital for IV abx. I have spoken with the pt and verbalized these results as well as the recommendation for her to return for IV abx. The pt states she will come back tomorrow morning. I have explained to the pt that this infection is life threatening and that she should return immediately. Pt verbalized understanding. Juju Cespedes PA-C     Dictation disclaimer:  Please note that this dictation was completed with Quat-E, the computer voice recognition software. Quite often unanticipated grammatical, syntax, homophones, and other interpretive errors are inadvertently transcribed by the computer software. Please disregard these errors. Please excuse any errors that have escaped final proofreading.

## 2021-09-07 NOTE — ED NOTES
Received call from lab.  Anerobic culture from yesterday @ 0423 is growing gram positive cocci in groups

## 2021-09-07 NOTE — PROGRESS NOTES
Chart reviewed  1 out of 4 blood culture positive for gram-positive cocci in groups  Spoke to ED RN, patient already been discharged and lab called after the patient's discharge. ED PA is talking to the patient to come back to the ED. spoke to ED PA April. PA already spoke to patient and patient will be coming back to ED. Patient will need to be readmitted once patient comes back with repeat blood cultures. 9/8/21 8.30 am  Called patient but did not answer, left a message to come to the ED as soon as possible. Called patient's mother and spoke to her and patient's dad over the phone and explained about patient's need to come to the ED for repeat cultures on admission. Told them about importance and severity of bacteremia including risk but not limited to leading to sepsis, septic shock and death. Per them, patient already left to go to the emergency room.

## 2021-09-07 NOTE — DISCHARGE INSTRUCTIONS
Discharge Instructions    Patient: Tracey Ramirez MRN: [id number]  CSN: [id number]    YOB: 1965  Age: 60 y.o. Sex: female    DOA: 9/6/2021 LOS:  LOS: 1 day   Discharge Date:      DIET:  Cardiac Diet    ACTIVITY: Activity as tolerated    ADDITIONAL INFORMATION: If you experience any of the following symptoms but not limited to Fever, chills, nausea, vomiting, diarrhea, change in mentation, falling, bleeding, shortness of breath, chest pain, please call your primary care physician or return to the emergency room if you cannot get hold of your doctor:     FOLLOW UP CARE:  Dr. Solis, Everardo Culp MD in 5-7 days. Please call and set up an appointment.   Dr. Haven Sánchez in 2 week      Yi Mayer MD  9/7/2021 2:35 PM
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
## 2021-09-07 NOTE — PROGRESS NOTES
completed the initial Spiritual Assessment of the patient in bed F of the emergency room as she lay in the hallway. Patient does not have an advance directive at this time. Patient is here due to possible drug use and complications. Patient does not have any Yazdanism/cultural needs that will affect patients preferences in health care. Chaplains will continue to follow and will provide pastoral care on an as needed/requested basis.     Jordin [facility] Care Department  [phone]

## 2021-09-07 NOTE — DISCHARGE SUMMARY
Discharge Summary    Patient: Henri Parker MRN: [id number]  CSN: [id number]    YOB: 1965  Age: 60 y.o. Sex: female    DOA: 9/6/2021 LOS:  LOS: 1 day        Disposition: Home     Discharge Date: 9/7/2021 COVID 19    Admission Diagnosis: Chest pain [R07.9]  Abnormal EKG [R94.31]  UTI (urinary tract infection) [N39.0]  Trichimoniasis [A59.9]    Discharge Diagnosis:    1. Chest pressure. 2.  Urine drug screen + cocaine. 3.  Urinary tract infection. 4.  Trichomoniasis. 5. Hx Heroin overdose    Discharge Condition: Stable      PHYSICAL EXAM  Visit Vitals  BP (!) 121/100   Pulse 81   Temp 98.4 °F (36.9 °C)   Resp 16   Ht 5' 4\" (1.626 m)   Wt 52.2 kg (115 lb)   SpO2 100%   BMI 19.74 kg/m²       General: Alert, cooperative, no acute distress    HEENT: PERRLA, EOMI. Anicteric sclerae. Lungs:  CTA Bilaterally. No Wheezing/Rales. Heart:             Regular rate and Rhythm. Abdomen: Soft, Non distended, Non tender. + Bowel sounds. Extremities: No edema. Psych:   Good insight. Not anxious or agitated. Neurologic:  AA, oriented X 3. Moves all ext                                 Hospital Course:   47-year-old -American female with known history of hypertension, proximal atrial fibrillation and her diabetes comes to the emergency room with complaints of chest pain. Patient had a fall few days back and since then she been having on and off chest pain and also pain radiating to her shoulder. Patient had some mild EKG changes, cardiac enzymes were negative. Cardiology been consulted. Patient states her chest pain is improved, she just feels some soreness now. Cardiac enzymes were negative. Cardiology saw the patient and recommended pain is most likely noncardiac and mostly musculoskeletal recommended no further intervention and okay for discharge from the standpoint. Discussed with cardiology PA, cleared patient for discharge.   Patient does not have any urinary symptoms, no dysuria, no frequency urgency of urine. She did have abnormal UA, previous cultures were pan sensitive. Since patient does not need any further work-up patient will be discharged home with outpatient follow-up with p.o. antibiotics. Discussed with the patient about discharge plan, follow-up appointments, medication compliance, advised them illicit drug use and healthy lifestyle. Patient understood and agreed with the plan. Procedures: None     Consults:   Dr. Alka Gallegos, cardiology    Imaging studies:       Discharge Medications:     Current Discharge Medication List      START taking these medications    Details   nitroglycerin (NITROSTAT) 0.4 mg SL tablet 1 Tablet by SubLINGual route every five (5) minutes as needed for Chest Pain for up to 3 doses. Qty: 25 Tablet, Refills: 0      cephALEXin (Keflex) 500 mg capsule Take 1 Capsule by mouth two (2) times a day for 7 days. Qty: 14 Capsule, Refills: 0      methocarbamoL (Robaxin) 500 mg tablet Take 1 Tablet by mouth three (3) times daily. Qty: 15 Tablet, Refills: 0         CONTINUE these medications which have CHANGED    Details   amLODIPine (NORVASC) 5 mg tablet Take 0.5 Tablets by mouth daily. Indications: high blood pressure  Qty: 30 Tablet, Refills: 0         CONTINUE these medications which have NOT CHANGED    Details   naloxone (Narcan) 4 mg/actuation nasal spray Use 1 spray intranasally, then discard. Repeat with new spray every 2 min as needed for opioid overdose symptoms, alternating nostrils. Qty: 2 Each, Refills: 0      aspirin delayed-release 81 mg tablet Take 1 Tab by mouth daily. Qty: 30 Tab, Refills: 0      atorvastatin (LIPITOR) 20 mg tablet Take 1 Tab by mouth nightly. Qty: 30 Tab, Refills: 0      folic acid (FOLVITE) 1 mg tablet Take 1 Tab by mouth daily. Qty: 30 Tab, Refills: 0      therapeutic multivitamin (THERAGRAN) tablet Take 1 Tab by mouth daily. Qty: 30 Tab, Refills: 0      thiamine (B-1) 100 mg tablet Take 1 Tab by mouth daily.   Qty: 30 Tab, Refills: 0         STOP taking these medications       metroNIDAZOLE (FlagyL) 500 mg tablet Comments:   Reason for Stopping:             · It is important that you take the medication exactly as they are prescribed. · Keep your medication in the bottles provided by the pharmacist and keep a list of the medication names, dosages, and times to be taken in your wallet. · Do not take other medications without consulting your doctor. DIET:  Cardiac Diet    ACTIVITY: Activity as tolerated    ADDITIONAL INFORMATION: If you experience any of the following symptoms but not limited to Fever, chills, nausea, vomiting, diarrhea, change in mentation, falling, bleeding, shortness of breath, chest pain, please call your primary care physician or return to the emergency room if you cannot get hold of your doctor:     FOLLOW UP CARE:  Dr. Cal Dailey, Juanis Arevalo MD in 5-7 days. Please call and set up an appointment. Dr. Rex Monteiro in 2 week    Minutes spent on discharge: 40 minutes spent coordinating this discharge (review instructions/follow-up, prescriptions, preparing report for sign off)    Lo Cheng MD  9/7/2021 2:37 PM    Disclaimer: Sections of this note are dictated using utilizing voice recognition software. Minor typographical errors may be present. If questions arise, please do not hesitate to contact me or call our department.

## 2021-09-08 ENCOUNTER — HOSPITAL ENCOUNTER (EMERGENCY)
Age: 56
Discharge: HOME | End: 2021-09-08
Attending: EMERGENCY MEDICINE
Payer: MEDICAID

## 2021-09-08 VITALS
OXYGEN SATURATION: 100 % | HEART RATE: 99 BPM | BODY MASS INDEX: 19.74 KG/M2 | DIASTOLIC BLOOD PRESSURE: 109 MMHG | RESPIRATION RATE: 16 BRPM | WEIGHT: 115 LBS | SYSTOLIC BLOOD PRESSURE: 141 MMHG | TEMPERATURE: 97.9 F

## 2021-09-08 DIAGNOSIS — R07.89 ATYPICAL CHEST PAIN: Primary | ICD-10-CM

## 2021-09-08 DIAGNOSIS — R78.81 POSITIVE BLOOD CULTURE: ICD-10-CM

## 2021-09-08 LAB
ALBUMIN SERPL-MCNC: 3.6 G/DL (ref 3.4–5)
ALBUMIN/GLOB SERPL: 0.9 {RATIO} (ref 0.8–1.7)
ALP SERPL-CCNC: 73 U/L (ref 45–117)
ALT SERPL-CCNC: 17 U/L (ref 13–56)
ANION GAP SERPL CALC-SCNC: 3 MMOL/L (ref 3–18)
AST SERPL-CCNC: 24 U/L (ref 10–38)
ATRIAL RATE: 81 BPM
BACTERIA SPEC CULT: ABNORMAL
BASOPHILS # BLD: 0 K/UL (ref 0–0.1)
BASOPHILS NFR BLD: 0 % (ref 0–2)
BILIRUB SERPL-MCNC: 0.8 MG/DL (ref 0.2–1)
BUN SERPL-MCNC: 9 MG/DL (ref 7–18)
BUN/CREAT SERPL: 14 (ref 12–20)
CALCIUM SERPL-MCNC: 8.7 MG/DL (ref 8.5–10.1)
CALCULATED P AXIS, ECG09: 79 DEGREES
CALCULATED R AXIS, ECG10: 78 DEGREES
CALCULATED T AXIS, ECG11: 84 DEGREES
CHLORIDE SERPL-SCNC: 107 MMOL/L (ref 100–111)
CK MB CFR SERPL CALC: NORMAL % (ref 0–4)
CK MB SERPL-MCNC: <1 NG/ML (ref 5–25)
CK SERPL-CCNC: 192 U/L (ref 26–192)
CO2 SERPL-SCNC: 30 MMOL/L (ref 21–32)
CREAT SERPL-MCNC: 0.65 MG/DL (ref 0.6–1.3)
DIAGNOSIS, 93000: NORMAL
DIFFERENTIAL METHOD BLD: ABNORMAL
EOSINOPHIL # BLD: 0 K/UL (ref 0–0.4)
EOSINOPHIL NFR BLD: 1 % (ref 0–5)
ERYTHROCYTE [DISTWIDTH] IN BLOOD BY AUTOMATED COUNT: 12.6 % (ref 11.6–14.5)
GLOBULIN SER CALC-MCNC: 3.9 G/DL (ref 2–4)
GLUCOSE SERPL-MCNC: 95 MG/DL (ref 74–99)
GRAM STN SPEC: ABNORMAL
GRAM STN SPEC: ABNORMAL
HCT VFR BLD AUTO: 37.7 % (ref 35–45)
HGB BLD-MCNC: 13 G/DL (ref 12–16)
LACTATE BLD-SCNC: 1.41 MMOL/L (ref 0.4–2)
LYMPHOCYTES # BLD: 1 K/UL (ref 0.9–3.6)
LYMPHOCYTES NFR BLD: 26 % (ref 21–52)
MCH RBC QN AUTO: 34.9 PG (ref 24–34)
MCHC RBC AUTO-ENTMCNC: 34.5 G/DL (ref 31–37)
MCV RBC AUTO: 101.3 FL (ref 78–100)
MONOCYTES # BLD: 0.3 K/UL (ref 0.05–1.2)
MONOCYTES NFR BLD: 9 % (ref 3–10)
NEUTS SEG # BLD: 2.5 K/UL (ref 1.8–8)
NEUTS SEG NFR BLD: 64 % (ref 40–73)
P-R INTERVAL, ECG05: 182 MS
PLATELET # BLD AUTO: 163 K/UL (ref 135–420)
PMV BLD AUTO: 10.2 FL (ref 9.2–11.8)
POTASSIUM SERPL-SCNC: 4.1 MMOL/L (ref 3.5–5.5)
PROT SERPL-MCNC: 7.5 G/DL (ref 6.4–8.2)
Q-T INTERVAL, ECG07: 388 MS
QRS DURATION, ECG06: 94 MS
QTC CALCULATION (BEZET), ECG08: 450 MS
RBC # BLD AUTO: 3.72 M/UL (ref 4.2–5.3)
SERVICE CMNT-IMP: ABNORMAL
SODIUM SERPL-SCNC: 140 MMOL/L (ref 136–145)
TROPONIN I SERPL-MCNC: <0.02 NG/ML (ref 0–0.04)
VENTRICULAR RATE, ECG03: 81 BPM
WBC # BLD AUTO: 3.9 K/UL (ref 4.6–13.2)

## 2021-09-08 PROCEDURE — 82553 CREATINE MB FRACTION: CPT

## 2021-09-08 PROCEDURE — 85025 COMPLETE CBC W/AUTO DIFF WBC: CPT

## 2021-09-08 PROCEDURE — 87040 BLOOD CULTURE FOR BACTERIA: CPT

## 2021-09-08 PROCEDURE — 83605 ASSAY OF LACTIC ACID: CPT

## 2021-09-08 PROCEDURE — 99283 EMERGENCY DEPT VISIT LOW MDM: CPT

## 2021-09-08 PROCEDURE — 80053 COMPREHEN METABOLIC PANEL: CPT

## 2021-09-08 PROCEDURE — 93005 ELECTROCARDIOGRAM TRACING: CPT

## 2021-09-08 NOTE — DISCHARGE INSTRUCTIONS
You presented to the ER today because you were called for 1 out of 2 positive blood cultures. I discussed the findings with infectious disease doctor who states it likely is a contaminant and in the absence of any other new symptoms concerning for infection, you should be able to go home and if the identification of the blood culture returns as something concerning requiring IV antibiotics, you will be called to return to the ER. You are having some atypical chest discomfort to the left side of your chest which was consistent with musculoskeletal pain. We did order another EKG which which was much improved from 2 days ago. Your heart enzyme was also normal as well as your other labs. Please continue taking Keflex for UTI at home return to the ER if you develop fevers, chills, nausea, vomiting, lethargy, worsening chest discomfort.

## 2021-09-08 NOTE — ED TRIAGE NOTES
Pt states she was seen here 2 days ago w/ chest pain. Pt was d/c then was called back for positive blood cultures.

## 2021-09-08 NOTE — ED PROVIDER NOTES
EMERGENCY DEPARTMENT HISTORY AND PHYSICAL EXAM      Patient Name: Christelle Sawyer    History of Presenting Illness     HPI:     49-year-old female with a history of HTN, polysubstance abuse, recent hospital admission for cocaine related chest pain and abnormal EKG 2 days ago, presents to the ED today after being called to return here abnormal blood culture that was drawn in the hospital 2 days prior. Patient states she is overall feeling well, much better than when she presented to the hospital 2 days ago. She states she has intermittent soreness to the left side of her chest however it is bearable. She denies any fevers, chills, cough, nausea, vomiting, weakness, lethargy, dysuria, rashes, any other skin changes, back pain, night sweats. PCP: Lorrie Mueller MD    No current facility-administered medications on file prior to encounter. Current Outpatient Medications on File Prior to Encounter   Medication Sig Dispense Refill    amLODIPine (NORVASC) 5 mg tablet Take 0.5 Tablets by mouth daily. Indications: high blood pressure 30 Tablet 0    nitroglycerin (NITROSTAT) 0.4 mg SL tablet 1 Tablet by SubLINGual route every five (5) minutes as needed for Chest Pain for up to 3 doses. 25 Tablet 0    cephALEXin (Keflex) 500 mg capsule Take 1 Capsule by mouth two (2) times a day for 7 days. 14 Capsule 0    methocarbamoL (Robaxin) 500 mg tablet Take 1 Tablet by mouth three (3) times daily. 15 Tablet 0    naloxone (Narcan) 4 mg/actuation nasal spray Use 1 spray intranasally, then discard. Repeat with new spray every 2 min as needed for opioid overdose symptoms, alternating nostrils. 2 Each 0    aspirin delayed-release 81 mg tablet Take 1 Tab by mouth daily. 30 Tab 0    atorvastatin (LIPITOR) 20 mg tablet Take 1 Tab by mouth nightly. 30 Tab 0    folic acid (FOLVITE) 1 mg tablet Take 1 Tab by mouth daily. 30 Tab 0    therapeutic multivitamin (THERAGRAN) tablet Take 1 Tab by mouth daily.  30 Tab 0    thiamine (B-1) 100 mg tablet Take 1 Tab by mouth daily. 30 Tab 0       Past History     Past Medical History:  Past Medical History:   Diagnosis Date    Hypertension        Past Surgical History:  No past surgical history on file. Family History:  No family history on file. Social History:  Social History     Tobacco Use    Smoking status: Never Smoker    Smokeless tobacco: Never Used   Substance Use Topics    Alcohol use: Yes     Comment: occational beer    Drug use: No       Allergies:  No Known Allergies    Review of Nursing Notes: I have reviewed the relevant nursing notes that were available at the time of this entry. Portions of the Family and Social history, as well as medications and allergies, may have been entered into my documentation by nursing or other ancillary staff; I have confirmed and may have supplemented that information to the best of my ability at the time the note was reviewed. There are some disagreements between the nursing notes and my evaluation at times. Some of the above referenced nursing documentation appears in my note after completion of my review. Review of Systems     CONSTITUTIONAL: Denies fevers, chills, sweats, or weight changes. EYES: Denies any visual changes or symptoms  HENT: Denies headaches, changes to hearing, rhinitis, sore throat, dysphagia, or change in voice. CV: Denies palpitations. Lungs/Chest: Denies dyspnea, wheezing, or cough. GI: Denies nausea, vomiting, diarrhea, constipation, abdominal pain, hematochezia, or melena. : Denies urinary retention or incontinence. No genital discharge. No dysuria. MSK: Denies myalgias or arthralgias. NEURO: Denies chronic headaches or seizures. No numbness, tingling or weakness. PSYCHIATRIC: Denies problems with mood disturbance and anxiety. DERMATOLOGIC: Denies any rashes or skin changes. Physical Exam     General: In no apparent distress. Well-nourished/well-developed.   Head/Neck: Normocephalic, atraumatic. Nontender midline neck, normal ROM. EENT: PERRLA. EOM intact bilaterally. Oropharyngeal mucosa is moist, lesions or erythema. No nasal discharge. Cardiovascular: RRR, no murmurs, gallops, or rubs. Peripheral pulses normal and intact in BUE and BLE. Lungs/Chest: CTAB, no wheezing, rhonchi, or rales. Abdomen: No distention. No organomegaly. No rebound, rigidity, or guarding. Nontender. Extremities/Skin: Warm distal extremities No deformities. No edema. No rashes. No splinter hemorrhages or Osler nodes or Janeway lesions. Neuro: A&O x 3. Grossly intact sensations and motor function in upper and lower extremities bilaterally. Psych: Appropriate mood and affect. Diagnostic Study Results     Labs -     Recent Results (from the past 12 hour(s))   CBC WITH AUTOMATED DIFF    Collection Time: 09/08/21  1:35 PM   Result Value Ref Range    WBC 3.9 (L) 4.6 - 13.2 K/uL    RBC 3.72 (L) 4.20 - 5.30 M/uL    HGB 13.0 12.0 - 16.0 g/dL    HCT 37.7 35.0 - 45.0 %    .3 (H) 78.0 - 100.0 FL    MCH 34.9 (H) 24.0 - 34.0 PG    MCHC 34.5 31.0 - 37.0 g/dL    RDW 12.6 11.6 - 14.5 %    PLATELET 816 623 - 998 K/uL    MPV 10.2 9.2 - 11.8 FL    NEUTROPHILS 64 40 - 73 %    LYMPHOCYTES 26 21 - 52 %    MONOCYTES 9 3 - 10 %    EOSINOPHILS 1 0 - 5 %    BASOPHILS 0 0 - 2 %    ABS. NEUTROPHILS 2.5 1.8 - 8.0 K/UL    ABS. LYMPHOCYTES 1.0 0.9 - 3.6 K/UL    ABS. MONOCYTES 0.3 0.05 - 1.2 K/UL    ABS. EOSINOPHILS 0.0 0.0 - 0.4 K/UL    ABS.  BASOPHILS 0.0 0.0 - 0.1 K/UL    DF AUTOMATED     METABOLIC PANEL, COMPREHENSIVE    Collection Time: 09/08/21  1:35 PM   Result Value Ref Range    Sodium 140 136 - 145 mmol/L    Potassium 4.1 3.5 - 5.5 mmol/L    Chloride 107 100 - 111 mmol/L    CO2 30 21 - 32 mmol/L    Anion gap 3 3.0 - 18 mmol/L    Glucose 95 74 - 99 mg/dL    BUN 9 7.0 - 18 MG/DL    Creatinine 0.65 0.6 - 1.3 MG/DL    BUN/Creatinine ratio 14 12 - 20      GFR est AA >60 >60 ml/min/1.73m2    GFR est non-AA >60 >60 ml/min/1.73m2 Calcium 8.7 8.5 - 10.1 MG/DL    Bilirubin, total 0.8 0.2 - 1.0 MG/DL    ALT (SGPT) 17 13 - 56 U/L    AST (SGOT) 24 10 - 38 U/L    Alk. phosphatase 73 45 - 117 U/L    Protein, total 7.5 6.4 - 8.2 g/dL    Albumin 3.6 3.4 - 5.0 g/dL    Globulin 3.9 2.0 - 4.0 g/dL    A-G Ratio 0.9 0.8 - 1.7     CARDIAC PANEL,(CK, CKMB & TROPONIN)    Collection Time: 09/08/21  1:35 PM   Result Value Ref Range    CK - MB <1.0 <3.6 ng/ml    CK-MB Index  0.0 - 4.0 %     CALCULATION NOT PERFORMED WHEN RESULT IS BELOW LINEAR LIMIT     26 - 192 U/L    Troponin-I, QT <0.02 0.0 - 0.045 NG/ML   POC LACTIC ACID    Collection Time: 09/08/21  1:39 PM   Result Value Ref Range    Lactic Acid (POC) 1.41 0.40 - 2.00 mmol/L         Medical Decision Making     I am the first provider for this patient. Vital Signs- I personally reviewed and interpreted the patient's vital signs. No data found. Pulse Oximetry Analysis - 100% on room air . ED interpretation: normal saturation    Cardiac Monitor:   Rate: 98 bpm  Rhythm: Normal Sinus Rhythm      EKG interpretation: Normal sinus rhythm, rate eighty-one, normal axis, normal intervals, nonspecific ST/T changes. Significant improvement from prior EKG on 9/6/2021. EKG read and interpreted by ED physician at 1900. Records Reviewed: I reviewed the patient's records to interpret any previous medical data available to me including EKGs, previous medical records, previous images, previous labs. Provider Notes (Medical Decision Making):     Overall well-appearing 39-year-old female who was hospitalized overnight 2 days ago for cocaine related chest pain and new EKG changes, presents to the ED after she received a phone call to return due to an abnormal blood culture that was drawn on hospital admission 2 days prior. Patient has no new complaints today, overall states she is feeling well however does have some intermittent left chest wall discomfort.   Reviewed the blood culture in question from 9/6, 1 out of 2 cultures grew gram-positive cocci in groups. Upon my examination, patient is afebrile and overall well appearing. Nontoxic. Hemodynamically normal. Neurovascularly intact. Auscultation is RRR, no evidence of septic emboli on exam.    Given today's clinical picture, my differential diagnoses indlude: Lab error, blood culture contaminant, ACS, musculoskeletal pain, anemia, arrhythmia, electrolyte abnormalities. To further refine my diagnosis, I will order EKG, labs including cardiac biomarkers, will repeat new blood cultures. Discussed with Dr. Damaris Pagan (ID) and reports it's likely a contaminant, however, to re-draw blood cultures today and discharge if she is overall well-appearing. Dr. Damaris Pagan reports that if the ID report comes back as staph aureus, to ask the patient to come back to the ER for IV antibiotics. Patient remained stable throughout their ED course. I discussed relevant findings with patient. My plan is to discharge home with return precautions and PCP follow-up within two days. Additional verbal discharge instructions were given and discussed with the patient. Patient expressed understanding, agrees to plan, and all of their questions were answered. ED Course:     ED Course as of Sep 09 0029   Thu Sep 09, 2021   0027 I reviewed the blood cultures from 9/6, the identification came back as coagulase-negative Staphylococcus species. This is more consistent with staph epidermidis/staph saprophyticus. Not staph aureus. This supports it was most likely contaminant.   The repeat cultures from today will need to be followed as well.    [EK]      ED Course User Index  [EK] Allan Rodas DO       Kenan Beat, DO  Date: 9/8/2021

## 2021-09-09 LAB
BACTERIA SPEC CULT: ABNORMAL
CC UR VC: ABNORMAL
SERVICE CMNT-IMP: ABNORMAL

## 2021-09-09 NOTE — ED NOTES
7130: Spoke with Dr Ngozi Tom, attending, who recommended consult ID. Spoke with Dr Agnes Antoine, ID, who recommends admission for gram (-) bacteremia until blood cultures fully result. She also recommends CT abd pelv w/ contrast for hydronephrosis and to r/o GI and  causes. [id number] with patient and confirmed using 2 patient identifiers. Discussed with patient that she should return to ED immediately for further management. Pt states she will return tomorrow. Stressed to pt that this is a life threatening condition and she should return ASAP due to concern for possible death. Pt states she will return \"when she can\".

## 2021-09-10 ENCOUNTER — APPOINTMENT (OUTPATIENT)
Dept: CT IMAGING | Age: 56
End: 2021-09-10
Attending: EMERGENCY MEDICINE
Payer: MEDICAID

## 2021-09-10 ENCOUNTER — HOSPITAL ENCOUNTER (EMERGENCY)
Age: 56
Discharge: HOME | End: 2021-09-10
Attending: EMERGENCY MEDICINE | Admitting: INTERNAL MEDICINE
Payer: MEDICAID

## 2021-09-10 ENCOUNTER — APPOINTMENT (OUTPATIENT)
Dept: GENERAL RADIOLOGY | Age: 56
End: 2021-09-10
Attending: EMERGENCY MEDICINE
Payer: MEDICAID

## 2021-09-10 VITALS — BODY MASS INDEX: 19.63 KG/M2 | HEIGHT: 64 IN | WEIGHT: 115 LBS | HEART RATE: 67 BPM

## 2021-09-10 DIAGNOSIS — R78.81 BACTEREMIA: Primary | ICD-10-CM

## 2021-09-10 LAB
BASOPHILS # BLD: 0 K/UL (ref 0–0.1)
BASOPHILS NFR BLD: 0 % (ref 0–2)
DIFFERENTIAL METHOD BLD: ABNORMAL
EOSINOPHIL # BLD: 0 K/UL (ref 0–0.4)
EOSINOPHIL NFR BLD: 1 % (ref 0–5)
ERYTHROCYTE [DISTWIDTH] IN BLOOD BY AUTOMATED COUNT: 12.8 % (ref 11.6–14.5)
HCT VFR BLD AUTO: 39.4 % (ref 35–45)
HGB BLD-MCNC: 13.6 G/DL (ref 12–16)
LACTATE BLD-SCNC: 0.91 MMOL/L (ref 0.4–2)
LYMPHOCYTES # BLD: 1.4 K/UL (ref 0.9–3.6)
LYMPHOCYTES NFR BLD: 36 % (ref 21–52)
MCH RBC QN AUTO: 35.2 PG (ref 24–34)
MCHC RBC AUTO-ENTMCNC: 34.5 G/DL (ref 31–37)
MCV RBC AUTO: 102.1 FL (ref 78–100)
MONOCYTES # BLD: 0.4 K/UL (ref 0.05–1.2)
MONOCYTES NFR BLD: 11 % (ref 3–10)
NEUTS SEG # BLD: 2 K/UL (ref 1.8–8)
NEUTS SEG NFR BLD: 51 % (ref 40–73)
PLATELET # BLD AUTO: 175 K/UL (ref 135–420)
PMV BLD AUTO: 10.5 FL (ref 9.2–11.8)
RBC # BLD AUTO: 3.86 M/UL (ref 4.2–5.3)
WBC # BLD AUTO: 3.9 K/UL (ref 4.6–13.2)

## 2021-09-10 PROCEDURE — 83605 ASSAY OF LACTIC ACID: CPT

## 2021-09-10 PROCEDURE — 85025 COMPLETE CBC W/AUTO DIFF WBC: CPT

## 2021-09-10 PROCEDURE — 99282 EMERGENCY DEPT VISIT SF MDM: CPT

## 2021-09-10 PROCEDURE — 74176 CT ABD & PELVIS W/O CONTRAST: CPT

## 2021-09-10 PROCEDURE — 87040 BLOOD CULTURE FOR BACTERIA: CPT

## 2021-09-10 PROCEDURE — 93005 ELECTROCARDIOGRAM TRACING: CPT

## 2021-09-10 PROCEDURE — 71045 X-RAY EXAM CHEST 1 VIEW: CPT

## 2021-09-10 RX ORDER — LEVOFLOXACIN 5 MG/ML
750 INJECTION, SOLUTION INTRAVENOUS EVERY 24 HOURS
Status: DISCONTINUED | OUTPATIENT
Start: 2021-09-10 | End: 2021-09-10 | Stop reason: HOSPADM

## 2021-09-10 RX ORDER — CEPHALEXIN 500 MG/1
500 CAPSULE ORAL 4 TIMES DAILY
Qty: 12 CAPSULE | Refills: 0 | Status: SHIPPED | OUTPATIENT
Start: 2021-09-10 | End: 2021-09-13

## 2021-09-10 RX ORDER — SODIUM CHLORIDE 0.9 % (FLUSH) 0.9 %
5-10 SYRINGE (ML) INJECTION AS NEEDED
Status: DISCONTINUED | OUTPATIENT
Start: 2021-09-10 | End: 2021-09-10 | Stop reason: HOSPADM

## 2021-09-10 NOTE — Clinical Note
Status[de-identified] INPATIENT [101]   Type of Bed: Medical [8]   Cardiac Monitoring Required?: No   Inpatient Hospitalization Certified Necessary for the Following Reasons: 3. Patient receiving treatment that can only be provided in an inpatient setting (further clarification in H&P documentation)   Admitting Diagnosis: Bacteremia [790. 7. ICD-9-CM]   Admitting Physician: Redd Florentino [[id number]]   Attending Physician: Redd Florentino [[id number]]   Estimated Length of Stay: 2 Midnights   Discharge Plan[de-identified] Home with Office Follow-up

## 2021-09-10 NOTE — ED NOTES
7:31 PM :Pt care assumed from Dr. Radha Acevedo , ED provider. Pt complaint(s), current treatment plan, progression and available diagnostic results have been discussed thoroughly. Rounding occurred: no  Intended Disposition: ADMIT   Pending diagnostic reports and/or labs (please list): CT    Patient CT scan shows no evidence of intra-abdominal source of infection. Due to this will admit patient to medicine for further treatment and evaluation. 8:42 PM    Spoke with admitting team who reached out to infectious disease. After further discussion as well as admitting team evaluating patient they have determined that patient acceptable for discharge with extra 3 days worth of Keflex for treatment. Will provide patient with return precautions and follow-up recommendations. Patient verbalized understanding and is without any further questions.

## 2021-09-10 NOTE — ED PROVIDER NOTES
EMERGENCY DEPARTMENT HISTORY AND PHYSICAL EXAM  This was created with voice recognition software and transcription errors may be present. 11:49 AM  Date: (Not on file)  Patient Name: Tyrel Brown    History of Presenting Illness     Chief Complaint:    History Provided By:     HPI: Tyrel Brown is a 60 y.o. female past medical history hypertension and polysubstance abuse presents because of positive blood cultures. Patient has some chest pain seen for earlier last week. She then received gram-positive staph coag negative in the anaerobic bottle and she was called back the following day and had blood cultures drawn those were both negative however she also had a urine sample and her aerobic bottle had gram-negative rods sensitivities pending note some still some mild chest and tenderness nonexertional no nausea or vomiting no fevers or chills nonpleuritic.  worse with movement    PCP: Chantel Lauren MD      Past History     Past Medical History:  Past Medical History:   Diagnosis Date    Hypertension        Past Surgical History:  No past surgical history on file. Family History:  No family history on file. Social History:  Social History     Tobacco Use    Smoking status: Never Smoker    Smokeless tobacco: Never Used   Substance Use Topics    Alcohol use: Yes     Comment: occational beer    Drug use: No       Allergies:  No Known Allergies    Review of Systems     Review of Systems   All other systems reviewed and are negative. 10 point review of systems otherwise negative unless noted in HPI. Physical Exam       Physical Exam  Constitutional:       Appearance: She is well-developed. HENT:      Head: Normocephalic and atraumatic. Eyes:      Pupils: Pupils are equal, round, and reactive to light. Cardiovascular:      Rate and Rhythm: Normal rate and regular rhythm. Heart sounds: Normal heart sounds. No murmur heard. No friction rub.    Pulmonary:      Effort: Pulmonary effort is normal. No respiratory distress. Breath sounds: Normal breath sounds. No wheezing. Abdominal:      General: There is no distension. Palpations: Abdomen is soft. Tenderness: There is no abdominal tenderness. There is no guarding or rebound. Musculoskeletal:         General: Normal range of motion. Cervical back: Normal range of motion and neck supple. Skin:     General: Skin is warm and dry. Neurological:      Mental Status: She is alert and oriented to person, place, and time. Psychiatric:         Behavior: Behavior normal.         Thought Content: Thought content normal.         Diagnostic Study Results     Vital Signs There were no vitals taken for this visit. EKG: EKG shows sinus at 67 normal axis normal intervals there is no ST elevation or depression no hypertrophy  Labs:   Imaging:     Medical Decision Making     ED Course: Progress Notes, Reevaluation, and Consults:    I will be the provider of record for this patient. Provider Notes (Medical Decision Making): Case was discussed with Dr. Fan Roy for ID as she had positive blood cultures and urine cultures patient was called back to observe in the hospital and repeat some cultures. We will send off more blood cultures and antibiotics      Dr. Iwona Mendoza for Cultures [phone] called twice times a busy signal also recommends getting a CT abdomen pelvis    Unable to reach given for the cultures. Given this will admit the patient for observation pending cultures. Discussed this with her she was in agreement       Diagnosis     Clinical Impression: No diagnosis found. Disposition:        Patient's Medications   Start Taking    No medications on file   Continue Taking    AMLODIPINE (NORVASC) 5 MG TABLET    Take 0.5 Tablets by mouth daily. Indications: high blood pressure    ASPIRIN DELAYED-RELEASE 81 MG TABLET    Take 1 Tab by mouth daily.     ATORVASTATIN (LIPITOR) 20 MG TABLET    Take 1 Tab by mouth nightly. CEPHALEXIN (KEFLEX) 500 MG CAPSULE    Take 1 Capsule by mouth two (2) times a day for 7 days. FOLIC ACID (FOLVITE) 1 MG TABLET    Take 1 Tab by mouth daily. METHOCARBAMOL (ROBAXIN) 500 MG TABLET    Take 1 Tablet by mouth three (3) times daily. NALOXONE (NARCAN) 4 MG/ACTUATION NASAL SPRAY    Use 1 spray intranasally, then discard. Repeat with new spray every 2 min as needed for opioid overdose symptoms, alternating nostrils. NITROGLYCERIN (NITROSTAT) 0.4 MG SL TABLET    1 Tablet by SubLINGual route every five (5) minutes as needed for Chest Pain for up to 3 doses. THERAPEUTIC MULTIVITAMIN (THERAGRAN) TABLET    Take 1 Tab by mouth daily. THIAMINE (B-1) 100 MG TABLET    Take 1 Tab by mouth daily.    These Medications have changed    No medications on file   Stop Taking    No medications on file

## 2021-09-11 LAB
ATRIAL RATE: 67 BPM
CALCULATED P AXIS, ECG09: 66 DEGREES
CALCULATED R AXIS, ECG10: 32 DEGREES
CALCULATED T AXIS, ECG11: 80 DEGREES
DIAGNOSIS, 93000: NORMAL
P-R INTERVAL, ECG05: 186 MS
Q-T INTERVAL, ECG07: 406 MS
QRS DURATION, ECG06: 94 MS
QTC CALCULATION (BEZET), ECG08: 429 MS
VENTRICULAR RATE, ECG03: 67 BPM

## 2021-09-11 NOTE — ED NOTES
I have reviewed discharge instructions with the patient. The patient verbalized understanding. Patient looks comfortable, left ED in stable condition.

## 2021-09-11 NOTE — ED NOTES
Patient made aware I need to obtain her vital signs. Patient states, \"i'm good, I don't need my vital signs done\". Patient looks comfortable.

## 2021-09-11 NOTE — PROGRESS NOTES
Spoke with patient and subsequently with ID Dr. Arlene Angeles. Mahamed Coles. Patient feeling well - only report is some continued L sided chest discomfort with cough which is stable from time of discharge. She denies any urinary complaints, flank pain, fevers / chills. She states she is feeling well and is ambulating in ER with steady gait. Reviewed home medications since d/c keflex 6 tabs remaining. Blood cultures negative x 2 days, abdominal CT without any hydronephrosis or other acute findings. Reiterated importance of no illicit drug use (patient states last use previous Saturday), denies injecting drugs. Vitals stable - taken by this provider as follows:   97.2 F oral temp  HR 74  BP - 160/90  Resp - 20  Oxygen 100% on RA    Stable for discharge with OP follow up (PCP and cardiology), extend keflex for additional 3 days to total 10 day course. Spoke with ED MD whom will discharge.      Signed By: Gioia Lesch, NP     September 10, 2021 Verbal consent given. Patient tolerated well.

## 2021-09-14 LAB
BACTERIA SPEC CULT: ABNORMAL
BACTERIA SPEC CULT: NORMAL
BACTERIA SPEC CULT: NORMAL
GRAM STN SPEC: ABNORMAL
GRAM STN SPEC: ABNORMAL
SERVICE CMNT-IMP: ABNORMAL
SERVICE CMNT-IMP: NORMAL
SERVICE CMNT-IMP: NORMAL

## 2021-09-16 LAB
BACTERIA SPEC CULT: NORMAL
BACTERIA SPEC CULT: NORMAL
SERVICE CMNT-IMP: NORMAL
SERVICE CMNT-IMP: NORMAL

## 2021-09-23 LAB
AEROBIC ID BY MALDI, ORJ11T: NORMAL
ORGANISM ID BY MALDI, ORJ1T: NORMAL
SOURCE, RSRC62: NORMAL
SOURCE, RSRC67: NORMAL

## 2021-09-24 LAB
AEROBIC ID BY SEQ, ORI2T: NORMAL
ORG ID BY SEQUENCING, ORI1T: NORMAL
SPECIMEN SOURCE: NORMAL
SPECIMEN SOURCE: NORMAL

## 2021-09-27 LAB
BACTERIA ISLT: ABNORMAL
OTHER ANTIBIOTIC SUSC ISLT: ABNORMAL
OTHER ANTIBIOTIC SUSC ISLT: ABNORMAL
SOURCE, RSRC70: ABNORMAL
SPECIMEN SOURCE: ABNORMAL

## 2021-12-25 ENCOUNTER — HOSPITAL ENCOUNTER (EMERGENCY)
Age: 56
Discharge: HOME | End: 2021-12-25
Attending: EMERGENCY MEDICINE
Payer: MEDICAID

## 2021-12-25 VITALS
SYSTOLIC BLOOD PRESSURE: 136 MMHG | HEIGHT: 64 IN | TEMPERATURE: 97.7 F | WEIGHT: 115 LBS | OXYGEN SATURATION: 100 % | BODY MASS INDEX: 19.63 KG/M2 | RESPIRATION RATE: 19 BRPM | HEART RATE: 80 BPM | DIASTOLIC BLOOD PRESSURE: 95 MMHG

## 2021-12-25 DIAGNOSIS — T40.1X1A ACCIDENTAL OVERDOSE OF HEROIN, INITIAL ENCOUNTER (HCC): Primary | ICD-10-CM

## 2021-12-25 PROCEDURE — 99283 EMERGENCY DEPT VISIT LOW MDM: CPT

## 2021-12-25 RX ORDER — NALOXONE HYDROCHLORIDE 4 MG/.1ML
SPRAY NASAL
Qty: 2 EACH | Refills: 0 | Status: SHIPPED | OUTPATIENT
Start: 2021-12-25

## 2021-12-25 NOTE — ED TRIAGE NOTES
Per medic: \" bystander called stating patient was unresponsive. Upon EMS arrival patient was noted to have agonal respirations with pin point pupils. Patient received 2 mgs of Narcan Int. Upon arrival to the ER patient was noted to be alert and oriented x 4.  NAD noted

## 2021-12-25 NOTE — DISCHARGE INSTRUCTIONS
[facility] in [address] Fax: [phone], phone: [phone]    [place] Fax: [phone], phone: [phone]    [place]:   [address] - [address]  [place]- [address]- [address] - [phone]     [address]-  Fax: [phone], phone [phone] or [phone]    [facility]- [phone]

## 2021-12-25 NOTE — ED PROVIDER NOTES
EMERGENCY DEPARTMENT HISTORY AND PHYSICAL EXAM    1:16 PM      Date: 12/25/2021  Patient Name: Sia Childress    History of Presenting Illness     No chief complaint on file. History Provided By: Patient  Location/Duration/Severity/Modifying factors   The patient is a 20-year-old female with a history of hypertension, opioid dependence, concern for infection recently with medications at the pharmacy for her to start taking, the presents emergency department with a complaint of passing out after using half a cap of heroin. Patient says she does not use regularly anymore and used a half cap of heroin and then became unresponsive. The patient was resuscitated with Narcan and now just complains of feeling cold and would like to go home and have Adrienne breakfast.  The patient denies any other aggravating or alleviating factors. Patient denies cough, congestion, runny nose and has been vaccinated for COVID-19. Patient denies being a smoker, is an occasional alcohol user, and admits to history of on and off heroin abuse. PCP: Liz Zhong MD    Current Outpatient Medications   Medication Sig Dispense Refill    naloxone (Narcan) 4 mg/actuation nasal spray Use 1 spray intranasally, then discard. Repeat with new spray every 2 min as needed for opioid overdose symptoms, alternating nostrils. 2 Each 0    amLODIPine (NORVASC) 5 mg tablet Take 0.5 Tablets by mouth daily. Indications: high blood pressure 30 Tablet 0    nitroglycerin (NITROSTAT) 0.4 mg SL tablet 1 Tablet by SubLINGual route every five (5) minutes as needed for Chest Pain for up to 3 doses. 25 Tablet 0    methocarbamoL (Robaxin) 500 mg tablet Take 1 Tablet by mouth three (3) times daily. 15 Tablet 0    aspirin delayed-release 81 mg tablet Take 1 Tab by mouth daily. 30 Tab 0    atorvastatin (LIPITOR) 20 mg tablet Take 1 Tab by mouth nightly. 30 Tab 0    folic acid (FOLVITE) 1 mg tablet Take 1 Tab by mouth daily.  30 Tab 0    therapeutic multivitamin (THERAGRAN) tablet Take 1 Tab by mouth daily. 30 Tab 0    thiamine (B-1) 100 mg tablet Take 1 Tab by mouth daily. 30 Tab 0       Past History     Past Medical History:  Past Medical History:   Diagnosis Date    Hypertension        Past Surgical History:  No past surgical history on file. Family History:  No family history on file. Social History:  Social History     Tobacco Use    Smoking status: Never Smoker    Smokeless tobacco: Never Used   Substance Use Topics    Alcohol use: Yes     Comment: occational beer    Drug use: No       Allergies:  No Known Allergies      Review of Systems       Review of Systems   Constitutional: Negative for activity change, chills, fatigue and fever. HENT: Negative for congestion and rhinorrhea. Eyes: Negative for visual disturbance. Respiratory: Negative for shortness of breath. Cardiovascular: Negative for chest pain and palpitations. Gastrointestinal: Negative for abdominal pain, diarrhea, nausea and vomiting. Genitourinary: Negative for dysuria and hematuria. Musculoskeletal: Negative for back pain. Skin: Negative for rash. Neurological: Negative for dizziness, weakness and light-headedness. All other systems reviewed and are negative. Physical Exam     Visit Vitals  BP (!) 136/95 (BP 1 Location: Left upper arm, BP Patient Position: At rest)   Pulse 80   Temp 97.7 °F (36.5 °C)   Resp 19   Ht 5' 4\" (1.626 m)   Wt 52.2 kg (115 lb)   SpO2 100%   BMI 19.74 kg/m²         Physical Exam  Vitals and nursing note reviewed. Constitutional:       General: She is not in acute distress. Appearance: She is well-developed. HENT:      Head: Normocephalic and atraumatic. Right Ear: External ear normal.      Left Ear: External ear normal.      Nose: Nose normal.   Eyes:      General: No scleral icterus. Conjunctiva/sclera: Conjunctivae normal.      Pupils: Pupils are equal, round, and reactive to light.    Neck: Thyroid: No thyromegaly. Vascular: No JVD. Trachea: No tracheal deviation. Cardiovascular:      Rate and Rhythm: Normal rate and regular rhythm. Heart sounds: Normal heart sounds. No murmur heard. No friction rub. No gallop. Pulmonary:      Effort: Pulmonary effort is normal.      Breath sounds: Normal breath sounds. Chest:      Chest wall: No tenderness. Abdominal:      General: Bowel sounds are normal. There is no distension. Palpations: Abdomen is soft. Tenderness: There is no abdominal tenderness. There is no guarding or rebound. Musculoskeletal:         General: No tenderness. Normal range of motion. Cervical back: Normal range of motion and neck supple. Lymphadenopathy:      Cervical: No cervical adenopathy. Skin:     General: Skin is warm and dry. Neurological:      Mental Status: She is alert and oriented to person, place, and time. Cranial Nerves: No cranial nerve deficit. Coordination: Coordination normal.      Comments: No sensory loss, Gait normal, Motor 5/5   Psychiatric:         Behavior: Behavior normal.         Thought Content: Thought content normal.         Judgment: Judgment normal.           Diagnostic Study Results     Labs -  No results found for this or any previous visit (from the past 12 hour(s)). Radiologic Studies -   No orders to display         Medical Decision Making   I am the first provider for this patient. I reviewed the vital signs, available nursing notes, past medical history, past surgical history, family history and social history. Vital Signs-Reviewed the patient's vital signs. Records Reviewed: Nursing Notes, Old Medical Records, Previous Radiology Studies and Previous Laboratory Studies (Time of Review: 1:16 PM)    ED Course: Progress Notes, Reevaluation, and Consults: The patient's been reevaluated, tolerating p.o. in no distress. We will discharge the patient.     Workup and recommendations were reviewed with the patient and all questions were answered. The patient understands the plan and will proceed with close outpatient care. I have encouraged the patient to return if at all worsened or concerned. Giuliano Simms DO 1:21 PM      Provider Notes (Medical Decision Making):   MDM  Number of Diagnoses or Management Options  Accidental overdose of heroin, initial encounter [facility])  Diagnosis management comments: Patient is a 52-year-old female with a history of opioid dependence and hypertension the presents emergency department after a drug overdose on heroin that she had sniffed. This was witnessed by her significant other is in the emergency department the patient was resuscitated with Narcan. The patient says she feels much better and would like to go home and has been observed for over an hour with any evidence of new sedation and is ambulating, tolerating p.o., and has no hypoxia. We will proceed with close outpatient care and refill her Narcan encouraged her to take her antibiotics that are at the pharmacy for her and will also give her information on recovery resources including Suboxone programs. The patient will return if at worsened or concerned and is being discharged into the care of her significant other. Procedures          Diagnosis     Clinical Impression:   1. Accidental overdose of heroin, initial encounter [facility])        Disposition: DC    Follow-up Information     Follow up With Specialties Details Why Contact Info    Rica Ortiz MD Internal Medicine On 12/27/2021  [facility] [id number] [phone]      Peer recovery support services at [phone]  Today      Suboxone programs as below               Current Discharge Medication List      CONTINUE these medications which have CHANGED    Details   naloxone (Narcan) 4 mg/actuation nasal spray Use 1 spray intranasally, then discard.  Repeat with new spray every 2 min as needed for opioid overdose symptoms, alternating nostrils. Qty: 2 Each, Refills: 0         CONTINUE these medications which have NOT CHANGED    Details   amLODIPine (NORVASC) 5 mg tablet Take 0.5 Tablets by mouth daily. Indications: high blood pressure  Qty: 30 Tablet, Refills: 0      nitroglycerin (NITROSTAT) 0.4 mg SL tablet 1 Tablet by SubLINGual route every five (5) minutes as needed for Chest Pain for up to 3 doses. Qty: 25 Tablet, Refills: 0      methocarbamoL (Robaxin) 500 mg tablet Take 1 Tablet by mouth three (3) times daily. Qty: 15 Tablet, Refills: 0      aspirin delayed-release 81 mg tablet Take 1 Tab by mouth daily. Qty: 30 Tab, Refills: 0      atorvastatin (LIPITOR) 20 mg tablet Take 1 Tab by mouth nightly. Qty: 30 Tab, Refills: 0      folic acid (FOLVITE) 1 mg tablet Take 1 Tab by mouth daily. Qty: 30 Tab, Refills: 0      therapeutic multivitamin (THERAGRAN) tablet Take 1 Tab by mouth daily. Qty: 30 Tab, Refills: 0      thiamine (B-1) 100 mg tablet Take 1 Tab by mouth daily. Qty: 30 Tab, Refills: 0           Disclaimer: Sections of this note are dictated using utilizing voice recognition software. Minor typographical errors may be present. If questions arise, please do not hesitate to contact me or call our department.

## 2022-03-18 PROBLEM — R79.89 ELEVATED TROPONIN: Status: ACTIVE | Noted: 2017-10-24

## 2022-03-18 PROBLEM — R77.8 ELEVATED TROPONIN: Status: ACTIVE | Noted: 2017-10-24

## 2022-03-18 PROBLEM — R94.31 ABNORMAL EKG: Status: ACTIVE | Noted: 2021-09-06

## 2022-03-18 PROBLEM — A59.9 TRICHIMONIASIS: Status: ACTIVE | Noted: 2021-09-06

## 2022-03-18 PROBLEM — I48.91 NEW ONSET ATRIAL FIBRILLATION (HCC): Status: ACTIVE | Noted: 2021-07-30

## 2022-03-18 PROBLEM — F10.10 ALCOHOL ABUSE: Status: ACTIVE | Noted: 2017-10-24

## 2022-03-19 PROBLEM — R78.81 BACTEREMIA: Status: ACTIVE | Noted: 2021-09-10

## 2022-03-19 PROBLEM — R07.9 CHEST PAIN: Status: ACTIVE | Noted: 2021-09-06

## 2022-03-19 PROBLEM — I21.4 NSTEMI (NON-ST ELEVATED MYOCARDIAL INFARCTION) (HCC): Status: ACTIVE | Noted: 2017-10-24

## 2022-03-19 PROBLEM — N39.0 UTI (URINARY TRACT INFECTION): Status: ACTIVE | Noted: 2021-09-06

## 2022-03-19 PROBLEM — R94.31 QT PROLONGATION: Status: ACTIVE | Noted: 2017-10-24

## 2022-03-19 PROBLEM — R55 SYNCOPE: Status: ACTIVE | Noted: 2017-10-24

## 2022-03-20 PROBLEM — F14.10 COCAINE ABUSE (HCC): Status: ACTIVE | Noted: 2017-10-24

## 2022-05-29 ENCOUNTER — HOSPITAL ENCOUNTER (EMERGENCY)
Age: 57
Discharge: HOME | End: 2022-05-29
Attending: STUDENT IN AN ORGANIZED HEALTH CARE EDUCATION/TRAINING PROGRAM
Payer: MEDICAID

## 2022-05-29 VITALS
BODY MASS INDEX: 19.63 KG/M2 | OXYGEN SATURATION: 99 % | HEIGHT: 64 IN | TEMPERATURE: 98.7 F | RESPIRATION RATE: 16 BRPM | DIASTOLIC BLOOD PRESSURE: 88 MMHG | HEART RATE: 81 BPM | SYSTOLIC BLOOD PRESSURE: 135 MMHG | WEIGHT: 115 LBS

## 2022-05-29 DIAGNOSIS — F11.10 HEROIN ABUSE (HCC): Primary | ICD-10-CM

## 2022-05-29 PROCEDURE — 99283 EMERGENCY DEPT VISIT LOW MDM: CPT

## 2022-05-29 NOTE — ED TRIAGE NOTES
Patient comes in by EMS after being found unresponsive. Patient was given 4 mg IN Narcan by police. Patient admits to using heroin.  Patient c/o N/V on arrival.

## 2022-05-29 NOTE — ED PROVIDER NOTES
EMERGENCY DEPARTMENT HISTORY AND PHYSICAL EXAM    I have evaluated the patient at 5:23 AM      Date: 5/29/2022  Patient Name: Vish Rosen    History of Presenting Illness     Chief Complaint   Patient presents with    Drug Overdose         History Provided By: Patient  Location/Duration/Severity/Modifying factors   67-year-old female with history of of opioid abuse presenting to the emergency department via EMS after being found unresponsive. Responded to 4 mg intranasal Narcan given by police. Patient admits to using heroin earlier tonight. Patient significant other at bedside reports that the she has had this issue ongoing for approximately 5 years now. No complaints otherwise. PCP: Elner Lefort, MD    Current Outpatient Medications   Medication Sig Dispense Refill    naloxone (Narcan) 4 mg/actuation nasal spray Use 1 spray intranasally, then discard. Repeat with new spray every 2 min as needed for opioid overdose symptoms, alternating nostrils. 2 Each 0    amLODIPine (NORVASC) 5 mg tablet Take 0.5 Tablets by mouth daily. Indications: high blood pressure 30 Tablet 0    nitroglycerin (NITROSTAT) 0.4 mg SL tablet 1 Tablet by SubLINGual route every five (5) minutes as needed for Chest Pain for up to 3 doses. 25 Tablet 0    methocarbamoL (Robaxin) 500 mg tablet Take 1 Tablet by mouth three (3) times daily. 15 Tablet 0    aspirin delayed-release 81 mg tablet Take 1 Tab by mouth daily. 30 Tab 0    atorvastatin (LIPITOR) 20 mg tablet Take 1 Tab by mouth nightly. 30 Tab 0    folic acid (FOLVITE) 1 mg tablet Take 1 Tab by mouth daily. 30 Tab 0    therapeutic multivitamin (THERAGRAN) tablet Take 1 Tab by mouth daily. 30 Tab 0    thiamine (B-1) 100 mg tablet Take 1 Tab by mouth daily. 30 Tab 0       Past History     Past Medical History:  Past Medical History:   Diagnosis Date    Hypertension        Past Surgical History:  No past surgical history on file.     Family History:  No family history on file. Social History:  Social History     Tobacco Use    Smoking status: Never Smoker    Smokeless tobacco: Never Used   Substance Use Topics    Alcohol use: Yes     Comment: occational beer    Drug use: No       Allergies:  No Known Allergies      Review of Systems       Review of Systems   Constitutional: Negative for activity change, chills, diaphoresis, fatigue and fever. Eyes: Negative for photophobia, pain and visual disturbance. Respiratory: Negative for cough, chest tightness, shortness of breath, wheezing and stridor. Cardiovascular: Negative for chest pain and palpitations. Gastrointestinal: Negative for abdominal distention, abdominal pain, constipation, diarrhea, nausea and vomiting. Genitourinary: Negative for difficulty urinating, dysuria and hematuria. Musculoskeletal: Negative for back pain, joint swelling and myalgias. Skin: Negative for rash and wound. Neurological: Negative for dizziness, weakness and headaches. Psychiatric/Behavioral: Negative for agitation. The patient is not nervous/anxious. Physical Exam     Visit Vitals  /80   Pulse 81   Temp 98.7 °F (37.1 °C)   Resp 16   Ht 5' 4\" (1.626 m)   Wt 52.2 kg (115 lb)   SpO2 94%   BMI 19.74 kg/m²         Physical Exam  Constitutional:       General: She is not in acute distress. Appearance: She is not toxic-appearing. HENT:      Head: Normocephalic and atraumatic. Mouth/Throat:      Mouth: Mucous membranes are moist.   Eyes:      Extraocular Movements: Extraocular movements intact. Pupils: Pupils are equal, round, and reactive to light. Cardiovascular:      Rate and Rhythm: Normal rate and regular rhythm. Heart sounds: Normal heart sounds. No murmur heard. No friction rub. No gallop. Pulmonary:      Effort: Pulmonary effort is normal.      Breath sounds: Normal breath sounds. Abdominal:      General: There is no distension. Palpations: Abdomen is soft. There is no mass. Tenderness: There is no abdominal tenderness. There is no guarding. Hernia: No hernia is present. Musculoskeletal:         General: No swelling, tenderness or deformity. Cervical back: Normal range of motion and neck supple. Skin:     General: Skin is warm and dry. Findings: No rash. Neurological:      General: No focal deficit present. Mental Status: She is alert and oriented to person, place, and time. Psychiatric:         Mood and Affect: Mood normal.           Diagnostic Study Results     Labs -  No results found for this or any previous visit (from the past 12 hour(s)). Radiologic Studies -   No orders to display         Medical Decision Making   I am the first provider for this patient. I reviewed the vital signs, available nursing notes, past medical history, past surgical history, family history and social history. Vital Signs-Reviewed the patient's vital signs. Records Reviewed: Nursing Notes and Old Medical Records (Time of Review: 5:23 AM)    ED Course: Progress Notes, Reevaluation, and Consults:         Provider Notes (Medical Decision Making):   MDM  Number of Diagnoses or Management Options  Heroin abuse ([address]  Diagnosis management comments: 75-year-old female presenting for evaluation of opioid overdose responded to Narcan. She has been monitored here for 4 and half hours. Vital signs are stable. She is alert and oriented and has ambulated. Is safe for discharge at this time. Diagnosis     Clinical Impression:   1.  Heroin abuse ([address]        Disposition: home    Follow-up Information     Follow up With Specialties Details Why [address] [facility] EMERGENCY DEPT Emergency Medicine  As needed, If symptoms worsen [address] [place] [id number]  Marcella Guy MD Internal Medicine Physician Call   [address] [place] [phone]             Patient's Medications   Start Taking    No medications on file   Continue Taking    AMLODIPINE (NORVASC) 5 MG TABLET    Take 0.5 Tablets by mouth daily. Indications: high blood pressure    ASPIRIN DELAYED-RELEASE 81 MG TABLET    Take 1 Tab by mouth daily. ATORVASTATIN (LIPITOR) 20 MG TABLET    Take 1 Tab by mouth nightly. FOLIC ACID (FOLVITE) 1 MG TABLET    Take 1 Tab by mouth daily. METHOCARBAMOL (ROBAXIN) 500 MG TABLET    Take 1 Tablet by mouth three (3) times daily. NALOXONE (NARCAN) 4 MG/ACTUATION NASAL SPRAY    Use 1 spray intranasally, then discard. Repeat with new spray every 2 min as needed for opioid overdose symptoms, alternating nostrils. NITROGLYCERIN (NITROSTAT) 0.4 MG SL TABLET    1 Tablet by SubLINGual route every five (5) minutes as needed for Chest Pain for up to 3 doses. THERAPEUTIC MULTIVITAMIN (THERAGRAN) TABLET    Take 1 Tab by mouth daily. THIAMINE (B-1) 100 MG TABLET    Take 1 Tab by mouth daily. These Medications have changed    No medications on file   Stop Taking    No medications on file     Disclaimer: Sections of this note are dictated using utilizing voice recognition software. Minor typographical errors may be present. If questions arise, please do not hesitate to contact me or call our department.

## 2022-05-29 NOTE — ED NOTES
Patient given discharge papers. Patient would like to wait for her \"old man\" before getting up because she's not sure how she will feel.  is on the way to come and pick patient up.

## 2022-09-25 ENCOUNTER — HOSPITAL ENCOUNTER (EMERGENCY)
Age: 57
Discharge: HOME | End: 2022-09-25
Attending: STUDENT IN AN ORGANIZED HEALTH CARE EDUCATION/TRAINING PROGRAM
Payer: MEDICAID

## 2022-09-25 VITALS
TEMPERATURE: 98 F | SYSTOLIC BLOOD PRESSURE: 124 MMHG | OXYGEN SATURATION: 97 % | HEART RATE: 76 BPM | BODY MASS INDEX: 20.14 KG/M2 | WEIGHT: 118 LBS | HEIGHT: 64 IN | DIASTOLIC BLOOD PRESSURE: 92 MMHG | RESPIRATION RATE: 18 BRPM

## 2022-09-25 DIAGNOSIS — T40.1X1A HEROIN OVERDOSE, ACCIDENTAL OR UNINTENTIONAL, INITIAL ENCOUNTER (HCC): Primary | ICD-10-CM

## 2022-09-25 PROCEDURE — 99284 EMERGENCY DEPT VISIT MOD MDM: CPT

## 2022-09-25 PROCEDURE — 74011250636 HC RX REV CODE- 250/636: Performed by: PHYSICIAN ASSISTANT

## 2022-09-25 PROCEDURE — 74011250637 HC RX REV CODE- 250/637: Performed by: PHYSICIAN ASSISTANT

## 2022-09-25 PROCEDURE — 96374 THER/PROPH/DIAG INJ IV PUSH: CPT

## 2022-09-25 RX ORDER — ONDANSETRON 4 MG/1
4 TABLET, FILM COATED ORAL
Qty: 20 TABLET | Refills: 0 | Status: SHIPPED | OUTPATIENT
Start: 2022-09-25

## 2022-09-25 RX ORDER — ONDANSETRON 4 MG/1
4 TABLET, ORALLY DISINTEGRATING ORAL
Status: COMPLETED | OUTPATIENT
Start: 2022-09-25 | End: 2022-09-25

## 2022-09-25 RX ORDER — ONDANSETRON 2 MG/ML
4 INJECTION INTRAMUSCULAR; INTRAVENOUS
Status: COMPLETED | OUTPATIENT
Start: 2022-09-25 | End: 2022-09-25

## 2022-09-25 RX ORDER — NALOXONE HYDROCHLORIDE 4 MG/.1ML
SPRAY NASAL
Qty: 2 EACH | Refills: 0 | Status: SHIPPED | OUTPATIENT
Start: 2022-09-25

## 2022-09-25 RX ADMIN — ONDANSETRON 4 MG: 2 INJECTION INTRAMUSCULAR; INTRAVENOUS at 15:25

## 2022-09-25 RX ADMIN — ONDANSETRON 4 MG: 4 TABLET, ORALLY DISINTEGRATING ORAL at 17:50

## 2022-09-25 NOTE — ED PROVIDER NOTES
EMERGENCY DEPARTMENT HISTORY AND PHYSICAL EXAM    Date: 9/25/2022  Patient Name: Adore Calhoun    History of Presenting Illness     Chief Complaint   Patient presents with    Drug Overdose         History Provided By: Patient    Chief Complaint: Heroin abuse, overdose  Duration: Prior to arrival  Timing: Acute  Location: N/A  Quality: Apneic  Severity: Severe  Modifying Factors: Worse after snorting heroin  Associated Symptoms: none       Additional History (Context): Adore Calhoun is a 60 y.o. female with a history of hypertension and substance abuse who presents today for issues listed above. Patient reports went to [employer] this morning and then after [employer] snorted heroin. Did not do this and attempt to harm her self. Patient was found apneic, given Narcan by EMS and became alert and oriented x4. Patient's main complaint at this point is nausea and vomiting. PCP: Deanna Santillan MD    Current Outpatient Medications   Medication Sig Dispense Refill    naloxone (Narcan) 4 mg/actuation nasal spray Use 1 spray intranasally, then discard. Repeat with new spray every 2 min as needed for opioid overdose symptoms, alternating nostrils. 2 Each 0    naloxone (Narcan) 4 mg/actuation nasal spray Use 1 spray intranasally, then discard. Repeat with new spray every 2 min as needed for opioid overdose symptoms, alternating nostrils. 2 Each 0    amLODIPine (NORVASC) 5 mg tablet Take 0.5 Tablets by mouth daily. Indications: high blood pressure 30 Tablet 0    nitroglycerin (NITROSTAT) 0.4 mg SL tablet 1 Tablet by SubLINGual route every five (5) minutes as needed for Chest Pain for up to 3 doses. 25 Tablet 0    methocarbamoL (Robaxin) 500 mg tablet Take 1 Tablet by mouth three (3) times daily. 15 Tablet 0    aspirin delayed-release 81 mg tablet Take 1 Tab by mouth daily. 30 Tab 0    atorvastatin (LIPITOR) 20 mg tablet Take 1 Tab by mouth nightly. 30 Tab 0    folic acid (FOLVITE) 1 mg tablet Take 1 Tab by mouth daily. 30 Tab 0    therapeutic multivitamin (THERAGRAN) tablet Take 1 Tab by mouth daily. 30 Tab 0    thiamine (B-1) 100 mg tablet Take 1 Tab by mouth daily. 30 Tab 0       Past History     Past Medical History:  Past Medical History:   Diagnosis Date    Hypertension        Past Surgical History:  No past surgical history on file. Family History:  No family history on file. Social History:  Social History     Tobacco Use    Smoking status: Never    Smokeless tobacco: Never   Substance Use Topics    Alcohol use: Yes     Comment: occational beer    Drug use: No       Allergies:  No Known Allergies      Review of Systems   Review of Systems   Constitutional:  Negative for chills and fever. HENT:  Negative for congestion, rhinorrhea and sore throat. Respiratory:  Positive for apnea. Negative for cough and shortness of breath. Cardiovascular:  Negative for chest pain. Gastrointestinal:  Positive for nausea and vomiting. Negative for abdominal pain, blood in stool, constipation and diarrhea. Genitourinary:  Negative for dysuria, frequency and hematuria. Musculoskeletal:  Negative for back pain and myalgias. Skin:  Negative for rash and wound. Neurological:  Negative for dizziness and headaches. All other systems reviewed and are negative. All Other Systems Negative  Physical Exam     Vitals:    09/25/22 1342 09/25/22 1417   BP: (!) 124/92    Pulse: 76    Resp: 18    Temp: 98 °F (36.7 °C)    SpO2:  97%   Weight: 53.5 kg (118 lb)    Height: 5' 4\" (1.626 m)      Physical Exam  Vitals and nursing note reviewed. Constitutional:       General: She is not in acute distress. Appearance: She is well-developed. She is not diaphoretic. HENT:      Head: Normocephalic and atraumatic. Eyes:      Conjunctiva/sclera: Conjunctivae normal.   Cardiovascular:      Rate and Rhythm: Normal rate and regular rhythm. Heart sounds: Normal heart sounds.    Pulmonary:      Effort: Pulmonary effort is normal. No respiratory distress. Breath sounds: Normal breath sounds. Chest:      Chest wall: No tenderness. Abdominal:      General: Bowel sounds are normal. There is no distension. Palpations: Abdomen is soft. Tenderness: There is no abdominal tenderness. There is no guarding or rebound. Musculoskeletal:         General: No deformity. Cervical back: Normal range of motion and neck supple. Skin:     General: Skin is warm and dry. Neurological:      General: No focal deficit present. Mental Status: She is alert and oriented to person, place, and time. GCS: GCS eye subscore is 4. GCS verbal subscore is 5. GCS motor subscore is 6. Cranial Nerves: Cranial nerves 2-12 are intact. Sensory: Sensation is intact. Motor: Motor function is intact. Coordination: Coordination is intact. Gait: Gait is intact. Deep Tendon Reflexes: Reflexes are normal and symmetric. Diagnostic Study Results     Labs -   No results found for this or any previous visit (from the past 12 hour(s)). Radiologic Studies -   No orders to display     CT Results  (Last 48 hours)      None          CXR Results  (Last 48 hours)      None              Medical Decision Making   I am the first provider for this patient. I reviewed the vital signs, available nursing notes, past medical history, past surgical history, family history and social history. Vital Signs-Reviewed the patient's vital signs. Records Reviewed: Nursing Notes and Old Medical Records     Procedures: None   Procedures    Provider Notes (Medical Decision Making):     Differential: Heroin abuse, cocaine abuse, alcohol abuse, SI, HI    Plan: Do not feel patient requires any additional Narcan at this time. We will closely monitor. Will order dose of Zofran. 4:05 PM  Patient has had an uneventful observation while in the emergency department. Nausea vomiting has resolved.   Will discharge home with Narcan and have strongly encourage patient to stop abusing heroin. She states understanding. Will discharge home. MED RECONCILIATION:  No current facility-administered medications for this encounter. Current Outpatient Medications   Medication Sig    naloxone (Narcan) 4 mg/actuation nasal spray Use 1 spray intranasally, then discard. Repeat with new spray every 2 min as needed for opioid overdose symptoms, alternating nostrils. naloxone (Narcan) 4 mg/actuation nasal spray Use 1 spray intranasally, then discard. Repeat with new spray every 2 min as needed for opioid overdose symptoms, alternating nostrils. amLODIPine (NORVASC) 5 mg tablet Take 0.5 Tablets by mouth daily. Indications: high blood pressure    nitroglycerin (NITROSTAT) 0.4 mg SL tablet 1 Tablet by SubLINGual route every five (5) minutes as needed for Chest Pain for up to 3 doses. methocarbamoL (Robaxin) 500 mg tablet Take 1 Tablet by mouth three (3) times daily. aspirin delayed-release 81 mg tablet Take 1 Tab by mouth daily. atorvastatin (LIPITOR) 20 mg tablet Take 1 Tab by mouth nightly. folic acid (FOLVITE) 1 mg tablet Take 1 Tab by mouth daily. therapeutic multivitamin (THERAGRAN) tablet Take 1 Tab by mouth daily. thiamine (B-1) 100 mg tablet Take 1 Tab by mouth daily. Disposition:  Home     DISCHARGE NOTE:   Pt has been reexamined. Patient has no new complaints, changes, or physical findings. Care plan outlined and precautions discussed. Results of workup were reviewed with the patient. All medications were reviewed with the patient. All of pt's questions and concerns were addressed. Patient was instructed and agrees to follow up with PCP as well as to return to the ED upon further deterioration. Patient is ready to go home.     Follow-up Information       Follow up With Specialties Details Why Contact Info    [facility] EMERGENCY DEPT Emergency Medicine  As needed [address] [place] [id number] [phone] Edward Fry MD Internal Medicine Physician Schedule an appointment as soon as possible for a visit   [address]  [phone]              Current Discharge Medication List        START taking these medications    Details   !! naloxone (Narcan) 4 mg/actuation nasal spray Use 1 spray intranasally, then discard. Repeat with new spray every 2 min as needed for opioid overdose symptoms, alternating nostrils. Qty: 2 Each, Refills: 0  Start date: 9/25/2022       !! - Potential duplicate medications found. Please discuss with provider. CONTINUE these medications which have NOT CHANGED    Details   !! naloxone (Narcan) 4 mg/actuation nasal spray Use 1 spray intranasally, then discard. Repeat with new spray every 2 min as needed for opioid overdose symptoms, alternating nostrils. Qty: 2 Each, Refills: 0       !! - Potential duplicate medications found. Please discuss with provider. Diagnosis     Clinical Impression:   1. Heroin overdose, accidental or unintentional, initial encounter [facility])          \"Please note that this dictation was completed with [employer], the computer voice recognition software. Quite often unanticipated grammatical, syntax, homophones, and other interpretive errors are inadvertently transcribed by the computer software. Please disregard these errors. Please excuse any errors that have escaped final proofreading. \"

## 2023-05-19 ENCOUNTER — HOSPITAL ENCOUNTER (EMERGENCY)
Facility: HOSPITAL | Age: 58
Discharge: LEFT BEFORE DISCHARGE | End: 2023-05-20
Attending: EMERGENCY MEDICINE
Payer: MEDICAID

## 2023-05-19 ENCOUNTER — APPOINTMENT (OUTPATIENT)
Facility: HOSPITAL | Age: 58
End: 2023-05-19
Payer: MEDICAID

## 2023-05-19 VITALS
TEMPERATURE: 97.7 F | HEART RATE: 77 BPM | RESPIRATION RATE: 16 BRPM | OXYGEN SATURATION: 98 % | DIASTOLIC BLOOD PRESSURE: 89 MMHG | SYSTOLIC BLOOD PRESSURE: 129 MMHG

## 2023-05-19 DIAGNOSIS — T50.901A ACCIDENTAL DRUG OVERDOSE, INITIAL ENCOUNTER: Primary | ICD-10-CM

## 2023-05-19 DIAGNOSIS — R29.898 RIGHT ARM WEAKNESS: ICD-10-CM

## 2023-05-19 LAB
ALBUMIN SERPL-MCNC: 3.9 G/DL (ref 3.4–5)
ALBUMIN/GLOB SERPL: 1 (ref 0.8–1.7)
ALP SERPL-CCNC: 72 U/L (ref 45–117)
ALT SERPL-CCNC: 24 U/L (ref 13–56)
ANION GAP SERPL CALC-SCNC: 7 MMOL/L (ref 3–18)
AST SERPL-CCNC: 38 U/L (ref 10–38)
BASOPHILS # BLD: 0 K/UL (ref 0–0.1)
BASOPHILS NFR BLD: 0 % (ref 0–2)
BILIRUB SERPL-MCNC: 0.5 MG/DL (ref 0.2–1)
BUN SERPL-MCNC: 16 MG/DL (ref 7–18)
BUN/CREAT SERPL: 24 (ref 12–20)
CALCIUM SERPL-MCNC: 9 MG/DL (ref 8.5–10.1)
CHLORIDE SERPL-SCNC: 108 MMOL/L (ref 100–111)
CO2 SERPL-SCNC: 25 MMOL/L (ref 21–32)
CREAT SERPL-MCNC: 0.68 MG/DL (ref 0.6–1.3)
DIFFERENTIAL METHOD BLD: ABNORMAL
EOSINOPHIL # BLD: 0 K/UL (ref 0–0.4)
EOSINOPHIL NFR BLD: 0 % (ref 0–5)
ERYTHROCYTE [DISTWIDTH] IN BLOOD BY AUTOMATED COUNT: 12.8 % (ref 11.6–14.5)
ETHANOL SERPL-MCNC: 77 MG/DL (ref 0–3)
GLOBULIN SER CALC-MCNC: 3.8 G/DL (ref 2–4)
GLUCOSE SERPL-MCNC: 92 MG/DL (ref 74–99)
HCT VFR BLD AUTO: 39.4 % (ref 35–45)
HGB BLD-MCNC: 13.4 G/DL (ref 12–16)
IMM GRANULOCYTES # BLD AUTO: 0.3 K/UL (ref 0–0.04)
IMM GRANULOCYTES NFR BLD AUTO: 2 % (ref 0–0.5)
LYMPHOCYTES # BLD: 0.8 K/UL (ref 0.9–3.6)
LYMPHOCYTES NFR BLD: 7 % (ref 21–52)
MCH RBC QN AUTO: 35.7 PG (ref 24–34)
MCHC RBC AUTO-ENTMCNC: 34 G/DL (ref 31–37)
MCV RBC AUTO: 105.1 FL (ref 78–100)
MONOCYTES # BLD: 0.5 K/UL (ref 0.05–1.2)
MONOCYTES NFR BLD: 4 % (ref 3–10)
NEUTS SEG # BLD: 10.1 K/UL (ref 1.8–8)
NEUTS SEG NFR BLD: 86 % (ref 40–73)
NRBC # BLD: 0 K/UL (ref 0–0.01)
NRBC BLD-RTO: 0 PER 100 WBC
PLATELET # BLD AUTO: 227 K/UL (ref 135–420)
PMV BLD AUTO: 8.9 FL (ref 9.2–11.8)
POTASSIUM SERPL-SCNC: 4.5 MMOL/L (ref 3.5–5.5)
PROT SERPL-MCNC: 7.7 G/DL (ref 6.4–8.2)
RBC # BLD AUTO: 3.75 M/UL (ref 4.2–5.3)
SODIUM SERPL-SCNC: 140 MMOL/L (ref 136–145)
TROPONIN I SERPL HS-MCNC: 10 NG/L (ref 0–54)
WBC # BLD AUTO: 11.7 K/UL (ref 4.6–13.2)

## 2023-05-19 PROCEDURE — 84484 ASSAY OF TROPONIN QUANT: CPT

## 2023-05-19 PROCEDURE — 82077 ASSAY SPEC XCP UR&BREATH IA: CPT

## 2023-05-19 PROCEDURE — 93005 ELECTROCARDIOGRAM TRACING: CPT

## 2023-05-19 PROCEDURE — 70450 CT HEAD/BRAIN W/O DYE: CPT

## 2023-05-19 PROCEDURE — 71045 X-RAY EXAM CHEST 1 VIEW: CPT

## 2023-05-19 PROCEDURE — 85025 COMPLETE CBC W/AUTO DIFF WBC: CPT

## 2023-05-19 PROCEDURE — 72141 MRI NECK SPINE W/O DYE: CPT

## 2023-05-19 PROCEDURE — 6370000000 HC RX 637 (ALT 250 FOR IP)

## 2023-05-19 PROCEDURE — 99221 1ST HOSP IP/OBS SF/LOW 40: CPT | Performed by: PSYCHIATRY & NEUROLOGY

## 2023-05-19 PROCEDURE — 73030 X-RAY EXAM OF SHOULDER: CPT

## 2023-05-19 PROCEDURE — 70551 MRI BRAIN STEM W/O DYE: CPT

## 2023-05-19 PROCEDURE — 99285 EMERGENCY DEPT VISIT HI MDM: CPT

## 2023-05-19 PROCEDURE — 80053 COMPREHEN METABOLIC PANEL: CPT

## 2023-05-19 PROCEDURE — 72125 CT NECK SPINE W/O DYE: CPT

## 2023-05-19 RX ORDER — ONDANSETRON 2 MG/ML
4 INJECTION INTRAMUSCULAR; INTRAVENOUS
Status: DISCONTINUED | OUTPATIENT
Start: 2023-05-19 | End: 2023-05-19

## 2023-05-19 RX ORDER — ONDANSETRON 4 MG/1
4 TABLET, ORALLY DISINTEGRATING ORAL
Status: COMPLETED | OUTPATIENT
Start: 2023-05-19 | End: 2023-05-19

## 2023-05-19 RX ADMIN — ONDANSETRON 4 MG: 4 TABLET, ORALLY DISINTEGRATING ORAL at 22:57

## 2023-05-19 ASSESSMENT — ENCOUNTER SYMPTOMS
NAUSEA: 0
COUGH: 0
ABDOMINAL PAIN: 0
CHEST TIGHTNESS: 0
DIARRHEA: 0
SHORTNESS OF BREATH: 0
BACK PAIN: 1
VOMITING: 0
CONSTIPATION: 0

## 2023-05-19 NOTE — ED PROVIDER NOTES
EMERGENCY DEPARTMENT HISTORY AND PHYSICAL EXAM    7:27 PM      Date: 5/19/2023  Patient Name: Alejandra Tipton    History of Presenting Illness     Chief Complaint   Patient presents with    Drug Overdose         History Provided By: the patient. Additional History (Context): Alejandra Tipton is a 59 y.o. female with a history of hypertension presenting to the emergency department due to suspected overdose. EMS reports that the patient was found unresponsive on the stairs outside of her home with agonal respirations and pinpoint pupils. Patient was given 2 mg of IM Narcan. Patient was alert and oriented x4 following this. Patient admits to back and right shoulder pain. Patient states that she is having numbness and tingling in her right arm. Admits to both alcohol and heroin use. Patient denies any chest pain or shortness of breath. Denies any fever or chills. PCP: Ana Paula Lopez MD    Current Facility-Administered Medications   Medication Dose Route Frequency Provider Last Rate Last Admin    ondansetron (ZOFRAN-ODT) disintegrating tablet 4 mg  4 mg Oral NOW Trev Wilson PA-C         Current Outpatient Medications   Medication Sig Dispense Refill    amLODIPine (NORVASC) 5 MG tablet Take 2.5 mg by mouth daily      aspirin 81 MG EC tablet Take 81 mg by mouth daily      atorvastatin (LIPITOR) 20 MG tablet Take 20 mg by mouth      folic acid (FOLVITE) 1 MG tablet Take 1 mg by mouth daily      methocarbamol (ROBAXIN) 500 MG tablet Take 500 mg by mouth 3 times daily      naloxone 4 MG/0.1ML LIQD nasal spray Use 1 spray intranasally, then discard. Repeat with new spray every 2 min as needed for opioid overdose symptoms, alternating nostrils.       nitroGLYCERIN (NITROSTAT) 0.4 MG SL tablet Place 0.4 mg under the tongue      ondansetron (ZOFRAN) 4 MG tablet Take 4 mg by mouth every 8 hours as needed      thiamine 100 MG tablet Take 100 mg by mouth daily         Past History     Past Medical History:  Past

## 2023-05-19 NOTE — ED TRIAGE NOTES
Patient to ED via EMS s/p suspected drug overdose. Pt reportedly found unresponsive on stairs outside of a home with agonal respirations and pinpoint pupils. Given 2mg Narcan IM. Pt arrives alert and oriented x4. C/o back pain. Admits to ETOH and \"a little heroin\".

## 2023-05-20 NOTE — ED PROVIDER NOTES
Heena known to me as someone who had overdosed fallen and gone down landing on her right upper extremity and now unable to move it. X-rays are unremarkable and neuro on-call provider came to evaluate patient. Dr. Angeles Zuniga examined the patient because of the concern that she is not able to bend at the elbow nor dorsiflex at the wrist patient has brachial plexus injury. MRI of brain and cervical spine showed no significant cord impingement intracranial hemorrhage. Updated Dr. Temitope Ellis but patient now has decided that she is unable and unwilling to stay despite her friend trying to convince her to. Gave her Dr. Rosie Freitas name and follow-up for outpatient follow-up but she offered her to return at any time morning her that of course her weakness could become permanent. Patient has signed out AMA. - Leslie jo 97, PA  05/20/23 0111

## 2023-05-20 NOTE — CONSULTS
Neurology    CC: Right weakness x 12 hours    HPI: The patient is a 59year old right handed woman with a h/o polydrug abuse who was found down earlier today with agonal respirations; Narcan resulted in responsiveness. She noted numbness an tingling in the right arm and weakness of the entire right UE. Some minimal neck pain. CT head and C spine are negative. Labs show normal chemistries, . PMH:Multiple admissions for drug overdoses. ROS: Left side and both legs normal in strength and sensation. No speech or vision changes. Exam: Cachectic appearing AA woman in NAD. Full ROM of neck. Cranial nerves: Grossly normal.    Motor: LUE and LLE intact, 5/5. RUE: Finger abductors 2/5, wrist extension 0/5, extension & flexion at the elbow 0/5, arm abduction 0/5, shoulder shrug 5/5 B. RLE strength is intact. Sensory: Decreased LT and PP from shoulder to fingers on the right. DTRs: Absent throughout. Impression: R/O lesion of multiple R cervical roots or of the R cervical plexus. Rec: MRI C spine and brachial plexus.

## 2023-05-20 NOTE — ED NOTES
Pt alert,  asking for soda.   PA states to  hold drink at this time     Veronica Lopez RN  05/20/23 0011

## 2023-05-21 LAB
EKG ATRIAL RATE: 67 BPM
EKG DIAGNOSIS: NORMAL
EKG P AXIS: 69 DEGREES
EKG P-R INTERVAL: 202 MS
EKG Q-T INTERVAL: 466 MS
EKG QRS DURATION: 102 MS
EKG QTC CALCULATION (BAZETT): 492 MS
EKG R AXIS: 42 DEGREES
EKG T AXIS: 74 DEGREES
EKG VENTRICULAR RATE: 67 BPM

## 2023-05-21 PROCEDURE — 93010 ELECTROCARDIOGRAM REPORT: CPT | Performed by: INTERNAL MEDICINE

## 2023-05-25 ENCOUNTER — HOSPITAL ENCOUNTER (EMERGENCY)
Facility: HOSPITAL | Age: 58
Discharge: HOME | End: 2023-05-25
Attending: EMERGENCY MEDICINE
Payer: MEDICAID

## 2023-05-25 VITALS
TEMPERATURE: 97.3 F | DIASTOLIC BLOOD PRESSURE: 86 MMHG | SYSTOLIC BLOOD PRESSURE: 119 MMHG | OXYGEN SATURATION: 96 % | HEART RATE: 86 BPM | RESPIRATION RATE: 16 BRPM

## 2023-05-25 DIAGNOSIS — R20.0 RIGHT ARM NUMBNESS: Primary | ICD-10-CM

## 2023-05-25 PROCEDURE — 99282 EMERGENCY DEPT VISIT SF MDM: CPT

## 2023-05-25 ASSESSMENT — ENCOUNTER SYMPTOMS
SORE THROAT: 0
DIARRHEA: 0
NAUSEA: 0
VOMITING: 0
ABDOMINAL DISTENTION: 0
WHEEZING: 0
SHORTNESS OF BREATH: 0
EYE DISCHARGE: 0

## 2023-05-25 ASSESSMENT — PAIN - FUNCTIONAL ASSESSMENT: PAIN_FUNCTIONAL_ASSESSMENT: NONE - DENIES PAIN

## 2023-05-25 NOTE — ED PROVIDER NOTES
Take 500 mg by mouth 3 times daily    naloxone 4 MG/0.1ML LIQD nasal spray Use 1 spray intranasally, then discard. Repeat with new spray every 2 min as needed for opioid overdose symptoms, alternating nostrils. nitroGLYCERIN (NITROSTAT) 0.4 MG SL tablet Place 0.4 mg under the tongue    ondansetron (ZOFRAN) 4 MG tablet Take 4 mg by mouth every 8 hours as needed    thiamine 100 MG tablet Take 100 mg by mouth daily       Disposition:  Home     DISCHARGE NOTE:   Pt has been reexamined. Patient has no new complaints, changes, or physical findings. Care plan outlined and precautions discussed. Results of workup were reviewed with the patient. All medications were reviewed with the patient. All of pt's questions and concerns were addressed. Patient was instructed and agrees to follow up with PCP/Ortho/Neuro as well as to return to the ED upon further deterioration. Patient is ready to go home. Medication List        ASK your doctor about these medications      amLODIPine 5 MG tablet  Commonly known as: NORVASC     aspirin 81 MG EC tablet     atorvastatin 20 MG tablet  Commonly known as: LIPITOR     folic acid 1 MG tablet  Commonly known as: FOLVITE     methocarbamol 500 MG tablet  Commonly known as: ROBAXIN     naloxone 4 MG/0.1ML Liqd nasal spray     nitroGLYCERIN 0.4 MG SL tablet  Commonly known as: NITROSTAT     ondansetron 4 MG tablet  Commonly known as: ZOFRAN     thiamine 100 MG tablet                  Diagnosis     Clinical Impression:   1. Right arm numbness          \"Please note that this dictation was completed with [employer], the computer voice recognition software. Quite often unanticipated grammatical, syntax, homophones, and other interpretive errors are inadvertently transcribed by the computer software. Please disregard these errors. Please excuse any errors that have escaped final proofreading. \"     MARIA LUISA Causey  05/25/23 [id number] Lashonda Hyde  05/25/23 8234

## 2023-06-02 ENCOUNTER — OFFICE VISIT (OUTPATIENT)
Age: 58
End: 2023-06-02

## 2023-06-02 VITALS — WEIGHT: 101 LBS | BODY MASS INDEX: 17.24 KG/M2 | HEIGHT: 64 IN

## 2023-06-02 DIAGNOSIS — R20.0 NUMBNESS AND TINGLING IN RIGHT HAND: ICD-10-CM

## 2023-06-02 DIAGNOSIS — M48.02 CERVICAL SPINAL STENOSIS: Primary | ICD-10-CM

## 2023-06-02 DIAGNOSIS — R20.2 NUMBNESS AND TINGLING IN RIGHT HAND: ICD-10-CM

## 2023-06-02 DIAGNOSIS — M79.2 RADICULAR PAIN OF RIGHT UPPER EXTREMITY: ICD-10-CM

## 2023-06-02 RX ORDER — METHYLPREDNISOLONE 4 MG/1
TABLET ORAL
Qty: 1 KIT | Refills: 0 | Status: SHIPPED | OUTPATIENT
Start: 2023-06-02 | End: 2023-06-08

## 2023-06-02 NOTE — PROGRESS NOTES
Alexus Amaya  1965   Chief Complaint   Patient presents with    Shoulder Pain     Rt         HISTORY OF PRESENT ILLNESS  Alexus Amaya is a 59 y.o. female who presents today for evaluation of right arm numbness. Patient reports this has been going on for the past 23 weeks. Reports it all began after she overdosed and fell on her arm wrong. She went to the ED on 5/25/23. She states before this injury she was able to use his arm and had no problems. Today she notes that she can not move the arm away from her body either forward or to the side. She notes numbness and tingling and weakness in her hands. She also feels like her hand is swollen. Pain is a 9/10. Has tried following treatments: Injections:No; Brace:No; Therapy:No; Cane/Crutch:No      No Known Allergies     Past Medical History:   Diagnosis Date    Hypertension       Social History       Tobacco History       Smoking Status  Never      Smokeless Tobacco Use  Never              Alcohol History       Alcohol Use Status  Yes              Drug Use       Drug Use Status  No              Sexual Activity       Sexually Active  Not Asked                   History reviewed. No pertinent surgical history. History reviewed. No pertinent family history. Current Outpatient Medications   Medication Sig    amLODIPine (NORVASC) 5 MG tablet Take 2.5 mg by mouth daily    aspirin 81 MG EC tablet Take 81 mg by mouth daily    atorvastatin (LIPITOR) 20 MG tablet Take 20 mg by mouth    folic acid (FOLVITE) 1 MG tablet Take 1 mg by mouth daily    methocarbamol (ROBAXIN) 500 MG tablet Take 500 mg by mouth 3 times daily    naloxone 4 MG/0.1ML LIQD nasal spray Use 1 spray intranasally, then discard. Repeat with new spray every 2 min as needed for opioid overdose symptoms, alternating nostrils.     nitroGLYCERIN (NITROSTAT) 0.4 MG SL tablet Place 0.4 mg under the tongue    ondansetron (ZOFRAN) 4 MG tablet Take 4 mg by mouth every 8 hours as needed

## 2023-07-11 ENCOUNTER — HOSPITAL ENCOUNTER (EMERGENCY)
Facility: HOSPITAL | Age: 58
Discharge: HOME | End: 2023-07-12
Attending: EMERGENCY MEDICINE
Payer: MEDICAID

## 2023-07-11 VITALS
RESPIRATION RATE: 13 BRPM | DIASTOLIC BLOOD PRESSURE: 95 MMHG | HEART RATE: 94 BPM | SYSTOLIC BLOOD PRESSURE: 126 MMHG | OXYGEN SATURATION: 97 %

## 2023-07-11 DIAGNOSIS — T50.901A ACCIDENTAL OVERDOSE, INITIAL ENCOUNTER: Primary | ICD-10-CM

## 2023-07-11 PROCEDURE — 93005 ELECTROCARDIOGRAM TRACING: CPT | Performed by: STUDENT IN AN ORGANIZED HEALTH CARE EDUCATION/TRAINING PROGRAM

## 2023-07-11 PROCEDURE — 99283 EMERGENCY DEPT VISIT LOW MDM: CPT

## 2023-07-11 ASSESSMENT — ENCOUNTER SYMPTOMS
VOMITING: 0
ABDOMINAL DISTENTION: 0
BACK PAIN: 0
CHEST TIGHTNESS: 0
SHORTNESS OF BREATH: 0
NAUSEA: 0

## 2023-07-12 LAB
EKG ATRIAL RATE: 93 BPM
EKG DIAGNOSIS: NORMAL
EKG P AXIS: 66 DEGREES
EKG P-R INTERVAL: 222 MS
EKG Q-T INTERVAL: 384 MS
EKG QRS DURATION: 112 MS
EKG QTC CALCULATION (BAZETT): 477 MS
EKG R AXIS: 22 DEGREES
EKG T AXIS: 75 DEGREES
EKG VENTRICULAR RATE: 93 BPM

## 2023-07-12 PROCEDURE — 93010 ELECTROCARDIOGRAM REPORT: CPT | Performed by: INTERNAL MEDICINE

## 2023-07-12 NOTE — ED TRIAGE NOTES
Pt presents to ED via EMS c/o OD. Pt not forthcoming w/ what drug she took but believes it was heroin. Pt got 0.4 mg Narcan IV in route.

## 2023-07-12 NOTE — ED PROVIDER NOTES
medications on file     Controlled Substances Monitoring:     No flowsheet data found.     (Please note that portions of this note were completed with a voice recognition program.  Efforts were made to edit the dictations but occasionally words are mis-transcribed.)    Juany Cano DO (electronically signed)  Attending Emergency Physician           Juany Cano DO  Resident  07/12/23 [address] [place] By DO Karen  Resident  07/12/23 1492

## 2023-07-26 ENCOUNTER — HOSPITAL ENCOUNTER (EMERGENCY)
Facility: HOSPITAL | Age: 58
Discharge: HOME | End: 2023-07-26
Attending: STUDENT IN AN ORGANIZED HEALTH CARE EDUCATION/TRAINING PROGRAM
Payer: MEDICAID

## 2023-07-26 VITALS
RESPIRATION RATE: 16 BRPM | DIASTOLIC BLOOD PRESSURE: 72 MMHG | SYSTOLIC BLOOD PRESSURE: 119 MMHG | OXYGEN SATURATION: 100 % | TEMPERATURE: 97.8 F | HEART RATE: 79 BPM

## 2023-07-26 DIAGNOSIS — T40.1X4A HEROIN OVERDOSE, UNDETERMINED INTENT, INITIAL ENCOUNTER (HCC): Primary | ICD-10-CM

## 2023-07-26 LAB
ALBUMIN SERPL-MCNC: 3.2 G/DL (ref 3.4–5)
ALBUMIN/GLOB SERPL: 1 (ref 0.8–1.7)
ALP SERPL-CCNC: 87 U/L (ref 45–117)
ALT SERPL-CCNC: 20 U/L (ref 13–56)
ANION GAP SERPL CALC-SCNC: 11 MMOL/L (ref 3–18)
AST SERPL-CCNC: 26 U/L (ref 10–38)
BILIRUB SERPL-MCNC: 0.3 MG/DL (ref 0.2–1)
BUN SERPL-MCNC: 15 MG/DL (ref 7–18)
BUN/CREAT SERPL: 19 (ref 12–20)
CALCIUM SERPL-MCNC: 8.9 MG/DL (ref 8.5–10.1)
CHLORIDE SERPL-SCNC: 112 MMOL/L (ref 100–111)
CO2 SERPL-SCNC: 22 MMOL/L (ref 21–32)
CREAT SERPL-MCNC: 0.81 MG/DL (ref 0.6–1.3)
EKG ATRIAL RATE: 85 BPM
EKG DIAGNOSIS: NORMAL
EKG P AXIS: 69 DEGREES
EKG P-R INTERVAL: 190 MS
EKG Q-T INTERVAL: 408 MS
EKG QRS DURATION: 98 MS
EKG QTC CALCULATION (BAZETT): 485 MS
EKG R AXIS: 37 DEGREES
EKG T AXIS: 84 DEGREES
EKG VENTRICULAR RATE: 85 BPM
ERYTHROCYTE [DISTWIDTH] IN BLOOD BY AUTOMATED COUNT: 11.9 % (ref 11.6–14.5)
GLOBULIN SER CALC-MCNC: 3.3 G/DL (ref 2–4)
GLUCOSE SERPL-MCNC: 128 MG/DL (ref 74–99)
HCT VFR BLD AUTO: 37 % (ref 35–45)
HGB BLD-MCNC: 12.6 G/DL (ref 12–16)
LIPASE SERPL-CCNC: 139 U/L (ref 73–393)
MCH RBC QN AUTO: 36.4 PG (ref 24–34)
MCHC RBC AUTO-ENTMCNC: 34.1 G/DL (ref 31–37)
MCV RBC AUTO: 106.9 FL (ref 78–100)
NRBC # BLD: 0 K/UL (ref 0–0.01)
NRBC BLD-RTO: 0 PER 100 WBC
PLATELET # BLD AUTO: 206 K/UL (ref 135–420)
PMV BLD AUTO: 9.5 FL (ref 9.2–11.8)
POTASSIUM SERPL-SCNC: 3.5 MMOL/L (ref 3.5–5.5)
PROT SERPL-MCNC: 6.5 G/DL (ref 6.4–8.2)
RBC # BLD AUTO: 3.46 M/UL (ref 4.2–5.3)
SODIUM SERPL-SCNC: 145 MMOL/L (ref 136–145)
TROPONIN I SERPL HS-MCNC: 4 NG/L (ref 0–54)
WBC # BLD AUTO: 3.3 K/UL (ref 4.6–13.2)

## 2023-07-26 PROCEDURE — 80053 COMPREHEN METABOLIC PANEL: CPT

## 2023-07-26 PROCEDURE — 93010 ELECTROCARDIOGRAM REPORT: CPT | Performed by: INTERNAL MEDICINE

## 2023-07-26 PROCEDURE — 84484 ASSAY OF TROPONIN QUANT: CPT

## 2023-07-26 PROCEDURE — 99284 EMERGENCY DEPT VISIT MOD MDM: CPT

## 2023-07-26 PROCEDURE — 85027 COMPLETE CBC AUTOMATED: CPT

## 2023-07-26 PROCEDURE — 93005 ELECTROCARDIOGRAM TRACING: CPT | Performed by: STUDENT IN AN ORGANIZED HEALTH CARE EDUCATION/TRAINING PROGRAM

## 2023-07-26 PROCEDURE — 83690 ASSAY OF LIPASE: CPT

## 2023-07-26 ASSESSMENT — ENCOUNTER SYMPTOMS
NAUSEA: 0
SHORTNESS OF BREATH: 0
DIARRHEA: 0
WHEEZING: 0
SORE THROAT: 0
EYE DISCHARGE: 0
ABDOMINAL DISTENTION: 0
VOMITING: 0

## 2023-07-26 ASSESSMENT — PAIN - FUNCTIONAL ASSESSMENT: PAIN_FUNCTIONAL_ASSESSMENT: NONE - DENIES PAIN

## 2023-07-26 NOTE — ED PROVIDER NOTES
EMERGENCY DEPARTMENT HISTORY AND PHYSICAL EXAM    Date: 7/26/2023  Patient Name: Cynthia Malcolm    History of Presenting Illness     Chief Complaint   Patient presents with    Drug Overdose         History Provided By: Patient      Additional History (Context): Cynthia Malcolm is a 59 y.o. female with a history of hypertension and polysubstance abuse who presents today for an overdose. Patient admits to doing heroin prior to arrival.  Patient was found down not breathing by EMS, was given 2 mg of Narcan and was quickly arousable. Patient has no complaints or concerns at this time      PCP: Thuy Durant MD    No current facility-administered medications for this encounter. Current Outpatient Medications   Medication Sig Dispense Refill    amoxicillin-clavulanate (AUGMENTIN) 875-125 MG per tablet Take 1 tablet by mouth in the morning and 1 tablet in the evening. for 10 days. amLODIPine (NORVASC) 5 MG tablet Take 0.5 tablets by mouth daily      aspirin 81 MG EC tablet Take 81 mg by mouth daily (Patient not taking: Reported on 6/14/2023)      atorvastatin (LIPITOR) 20 MG tablet Take 1 tablet by mouth      folic acid (FOLVITE) 1 MG tablet Take 1 tablet by mouth daily      methocarbamol (ROBAXIN) 500 MG tablet Take 1 tablet by mouth 3 times daily      naloxone 4 MG/0.1ML LIQD nasal spray Use 1 spray intranasally, then discard. Repeat with new spray every 2 min as needed for opioid overdose symptoms, alternating nostrils. (Patient not taking: Reported on 6/14/2023)      nitroGLYCERIN (NITROSTAT) 0.4 MG SL tablet Place 1 tablet under the tongue      ondansetron (ZOFRAN) 4 MG tablet Take 1 tablet by mouth every 8 hours as needed      thiamine 100 MG tablet Take 1 tablet by mouth daily         Past History     Past Medical History:  Past Medical History:   Diagnosis Date    Hypertension        Past Surgical History:  No past surgical history on file. Family History:  No family history on file.     Social

## 2023-07-26 NOTE — ED NOTES
.  Chief Complaint   Patient presents with    Drug Overdose       Patient came in due to drug overdose. She was found unresponsive and not breathing by EMS personnel. Total of 3 mg Narcan given by EMS (2mg intranasal, 1mg Intravenous). Patient stated that she use illegal drugs \"sometimes\". Also, she said she is homeless and sometimes going to his friend's house.      Rusty Albrecht RN  07/26/23 0663

## 2023-07-30 ENCOUNTER — APPOINTMENT (OUTPATIENT)
Facility: HOSPITAL | Age: 58
End: 2023-07-30
Payer: MEDICAID

## 2023-07-30 ENCOUNTER — HOSPITAL ENCOUNTER (EMERGENCY)
Facility: HOSPITAL | Age: 58
Discharge: HOME | End: 2023-07-30
Attending: EMERGENCY MEDICINE
Payer: MEDICAID

## 2023-07-30 VITALS
TEMPERATURE: 97.7 F | SYSTOLIC BLOOD PRESSURE: 111 MMHG | OXYGEN SATURATION: 98 % | RESPIRATION RATE: 19 BRPM | HEART RATE: 93 BPM | DIASTOLIC BLOOD PRESSURE: 89 MMHG

## 2023-07-30 DIAGNOSIS — T50.901A ACCIDENTAL DRUG OVERDOSE, INITIAL ENCOUNTER: Primary | ICD-10-CM

## 2023-07-30 PROCEDURE — 70450 CT HEAD/BRAIN W/O DYE: CPT

## 2023-07-30 PROCEDURE — 72125 CT NECK SPINE W/O DYE: CPT

## 2023-07-30 PROCEDURE — 99284 EMERGENCY DEPT VISIT MOD MDM: CPT

## 2023-07-30 ASSESSMENT — ENCOUNTER SYMPTOMS
DIARRHEA: 0
SHORTNESS OF BREATH: 0
ABDOMINAL PAIN: 0
NAUSEA: 0
CHEST TIGHTNESS: 0
CONSTIPATION: 0
VOMITING: 0
COUGH: 0

## 2023-07-30 ASSESSMENT — LIFESTYLE VARIABLES: HOW OFTEN DO YOU HAVE A DRINK CONTAINING ALCOHOL: 2-3 TIMES A WEEK

## 2023-07-31 NOTE — ED NOTES
Pt d/cd to home awake, alert and in NAD. All questions answered.       [facility], RN  07/30/23 4233

## 2023-07-31 NOTE — ED NOTES
Patient taken to CT scan because she stated that she fell and hit her head.      Denisha Waite RN  07/30/23 2053
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
## 2023-07-31 NOTE — DISCHARGE INSTRUCTIONS
Discontinue drug use. Follow-up with PCP within the next 2 days in order to be reevaluated. Return to the ED with any new or worsening symptoms.
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
## 2024-01-10 ENCOUNTER — APPOINTMENT (OUTPATIENT)
Facility: HOSPITAL | Age: 59
End: 2024-01-10
Payer: MEDICAID

## 2024-01-10 ENCOUNTER — HOSPITAL ENCOUNTER (EMERGENCY)
Facility: HOSPITAL | Age: 59
Discharge: HOME | End: 2024-01-10
Attending: STUDENT IN AN ORGANIZED HEALTH CARE EDUCATION/TRAINING PROGRAM
Payer: MEDICAID

## 2024-01-10 VITALS
DIASTOLIC BLOOD PRESSURE: 92 MMHG | BODY MASS INDEX: 18.78 KG/M2 | OXYGEN SATURATION: 100 % | HEIGHT: 64 IN | HEART RATE: 77 BPM | RESPIRATION RATE: 17 BRPM | SYSTOLIC BLOOD PRESSURE: 139 MMHG | TEMPERATURE: 98.1 F | WEIGHT: 110 LBS

## 2024-01-10 DIAGNOSIS — S92.422A DISPLACED FRACTURE OF DISTAL PHALANX OF LEFT GREAT TOE, INITIAL ENCOUNTER FOR CLOSED FRACTURE: Primary | ICD-10-CM

## 2024-01-10 PROCEDURE — 73610 X-RAY EXAM OF ANKLE: CPT

## 2024-01-10 PROCEDURE — 73630 X-RAY EXAM OF FOOT: CPT

## 2024-01-10 PROCEDURE — 73590 X-RAY EXAM OF LOWER LEG: CPT

## 2024-01-10 PROCEDURE — 99283 EMERGENCY DEPT VISIT LOW MDM: CPT

## 2024-01-10 ASSESSMENT — ENCOUNTER SYMPTOMS
CONSTIPATION: 0
CHEST TIGHTNESS: 0
NAUSEA: 0
ABDOMINAL PAIN: 0
COUGH: 0
DIARRHEA: 0
SHORTNESS OF BREATH: 0
VOMITING: 0

## 2024-01-10 ASSESSMENT — PAIN DESCRIPTION - ORIENTATION: ORIENTATION: LEFT

## 2024-01-10 ASSESSMENT — PAIN - FUNCTIONAL ASSESSMENT
PAIN_FUNCTIONAL_ASSESSMENT: 0-10
PAIN_FUNCTIONAL_ASSESSMENT: NONE - DENIES PAIN

## 2024-01-10 ASSESSMENT — PAIN SCALES - GENERAL: PAINLEVEL_OUTOF10: 8

## 2024-01-10 ASSESSMENT — PAIN DESCRIPTION - LOCATION: LOCATION: FOOT
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
## 2024-01-10 NOTE — ED PROVIDER NOTES
Forefoot soft tissue swelling. First   MTP degenerative changes.            Medical Decision Making   I am the first provider for this patient.    I reviewed the vital signs, available nursing notes, past medical history, past surgical history, family history and social history.    Vital Signs-Reviewed the patient's vital signs.    Pulse Oximetry Analysis -  100 on room air (Interpretation)    Records Reviewed: Prior medical records(Time of Review: 8:14 PM)    ED Course: Progress Notes, Reevaluation, and Consults:  DDx: Tibia fracture, fibula fracture, tarsal fracture, contusion, dislocation, ligament injury    Provider Notes (Medical Decision Making):   Patient is a 58-year-old female present to the emergency department due to left ankle and foot pain.  On exam, patient is alert, oriented x 3, no acute distress.  Heart regular rate and rhythm.  No murmurs appreciated. Lungs clear to auscultation bilaterally.  Abdomen is soft and nontender. Left lower extremity is neurovascularly intact.  Swelling noted to the left ankle and left foot.  Area is ecchymotic and tender on palpation.  Decreased range of motion due to pain and swelling.  Will obtain x-ray of the left foot, left ankle, and left tib/fib.    X-ray of the left hip/fib shows no acute osseous findings.  Mild distal lower leg soft tissue swelling.  Left ankle shows no acute osseous findings.  Soft tissue swelling.  Left foot shows great toe base of distal phalanx fracture.  Forefoot swelling.    Patient was discussed with podiatry Dr. Thomas due to significant swelling.  Recommends placing the patient in a walking boot.  States that he can see her in the office tomorrow.  Patient informed of all results as well as need for close follow-up.  Patient instructed alternate Tylenol and ibuprofen as needed for pain.  Instructed to rest, ice, compress, elevate the affected leg to decrease pain and swelling.  Educated on signs and symptoms to monitor for and give

## 2024-01-10 NOTE — DISCHARGE INSTRUCTIONS
Alternate tylenol and ibuprofen as needed for pain.   Leave boot in place until you're re-evaluated by the Podiatrist. Please call Dr. Thomas.   Return to the ED with any new or worsening symptoms.

## 2024-04-28 ENCOUNTER — HOSPITAL ENCOUNTER (EMERGENCY)
Facility: HOSPITAL | Age: 59
Discharge: HOME | End: 2024-04-28
Payer: MEDICAID

## 2024-04-28 VITALS
HEIGHT: 64 IN | RESPIRATION RATE: 20 BRPM | SYSTOLIC BLOOD PRESSURE: 160 MMHG | HEART RATE: 61 BPM | OXYGEN SATURATION: 100 % | DIASTOLIC BLOOD PRESSURE: 97 MMHG | WEIGHT: 110 LBS | TEMPERATURE: 97.2 F | BODY MASS INDEX: 18.78 KG/M2

## 2024-04-28 DIAGNOSIS — T40.1X1A ACCIDENTAL OVERDOSE OF HEROIN, INITIAL ENCOUNTER (HCC): Primary | ICD-10-CM

## 2024-04-28 PROCEDURE — 96374 THER/PROPH/DIAG INJ IV PUSH: CPT

## 2024-04-28 PROCEDURE — 2580000003 HC RX 258

## 2024-04-28 PROCEDURE — 6360000002 HC RX W HCPCS

## 2024-04-28 PROCEDURE — 99284 EMERGENCY DEPT VISIT MOD MDM: CPT

## 2024-04-28 PROCEDURE — 96361 HYDRATE IV INFUSION ADD-ON: CPT

## 2024-04-28 RX ORDER — ONDANSETRON 2 MG/ML
4 INJECTION INTRAMUSCULAR; INTRAVENOUS
Status: COMPLETED | OUTPATIENT
Start: 2024-04-28 | End: 2024-04-28

## 2024-04-28 RX ORDER — 0.9 % SODIUM CHLORIDE 0.9 %
1000 INTRAVENOUS SOLUTION INTRAVENOUS ONCE
Status: COMPLETED | OUTPATIENT
Start: 2024-04-28 | End: 2024-04-28

## 2024-04-28 RX ADMIN — SODIUM CHLORIDE 1000 ML: 9 INJECTION, SOLUTION INTRAVENOUS at 17:15

## 2024-04-28 RX ADMIN — ONDANSETRON 4 MG: 2 INJECTION INTRAMUSCULAR; INTRAVENOUS at 17:15

## 2024-04-28 ASSESSMENT — PAIN - FUNCTIONAL ASSESSMENT: PAIN_FUNCTIONAL_ASSESSMENT: NONE - DENIES PAIN

## 2024-04-28 ASSESSMENT — PAIN SCALES - GENERAL: PAINLEVEL_OUTOF10: 0

## 2024-04-28 NOTE — ED NOTES
Pt awake, alert and oriented at this time. Pt medicated per MAR. Warm blankets provided for comfort

## 2024-04-28 NOTE — ED PROVIDER NOTES
disregard these errors.  Please excuse any errors that have escaped final proofreading.        Nancy Velazquez PA-C  04/28/24 1957

## 2024-05-11 ENCOUNTER — HOSPITAL ENCOUNTER (EMERGENCY)
Facility: HOSPITAL | Age: 59
Discharge: HOME | End: 2024-05-11
Attending: EMERGENCY MEDICINE
Payer: MEDICAID

## 2024-05-11 VITALS
HEART RATE: 80 BPM | HEIGHT: 64 IN | DIASTOLIC BLOOD PRESSURE: 95 MMHG | RESPIRATION RATE: 21 BRPM | OXYGEN SATURATION: 97 % | BODY MASS INDEX: 17.28 KG/M2 | WEIGHT: 101.2 LBS | SYSTOLIC BLOOD PRESSURE: 134 MMHG | TEMPERATURE: 97.3 F

## 2024-05-11 DIAGNOSIS — F19.10 POLYSUBSTANCE ABUSE (HCC): ICD-10-CM

## 2024-05-11 DIAGNOSIS — T40.601A OPIATE OVERDOSE, ACCIDENTAL OR UNINTENTIONAL, INITIAL ENCOUNTER (HCC): Primary | ICD-10-CM

## 2024-05-11 LAB
ANION GAP SERPL CALC-SCNC: 6 MMOL/L (ref 3–18)
BASOPHILS # BLD: 0 K/UL (ref 0–0.1)
BASOPHILS NFR BLD: 0 % (ref 0–2)
BUN SERPL-MCNC: 8 MG/DL (ref 7–18)
BUN/CREAT SERPL: 23 (ref 12–20)
CALCIUM SERPL-MCNC: 6.4 MG/DL (ref 8.5–10.1)
CHLORIDE SERPL-SCNC: 119 MMOL/L (ref 100–111)
CO2 SERPL-SCNC: 21 MMOL/L (ref 21–32)
CREAT SERPL-MCNC: 0.35 MG/DL (ref 0.6–1.3)
DIFFERENTIAL METHOD BLD: ABNORMAL
EOSINOPHIL # BLD: 0 K/UL (ref 0–0.4)
EOSINOPHIL NFR BLD: 1 % (ref 0–5)
ERYTHROCYTE [DISTWIDTH] IN BLOOD BY AUTOMATED COUNT: 12.2 % (ref 11.6–14.5)
ETHANOL SERPL-MCNC: 107 MG/DL (ref 0–3)
GLUCOSE SERPL-MCNC: 93 MG/DL (ref 74–99)
HCT VFR BLD AUTO: 41 % (ref 35–45)
HGB BLD-MCNC: 13.5 G/DL (ref 12–16)
IMM GRANULOCYTES # BLD AUTO: 0 K/UL (ref 0–0.04)
IMM GRANULOCYTES NFR BLD AUTO: 0 % (ref 0–0.5)
LYMPHOCYTES # BLD: 1.6 K/UL (ref 0.9–3.6)
LYMPHOCYTES NFR BLD: 57 % (ref 21–52)
MCH RBC QN AUTO: 34.5 PG (ref 24–34)
MCHC RBC AUTO-ENTMCNC: 32.9 G/DL (ref 31–37)
MCV RBC AUTO: 104.9 FL (ref 78–100)
MONOCYTES # BLD: 0.2 K/UL (ref 0.05–1.2)
MONOCYTES NFR BLD: 7 % (ref 3–10)
NEUTS SEG # BLD: 1 K/UL (ref 1.8–8)
NEUTS SEG NFR BLD: 35 % (ref 40–73)
NRBC # BLD: 0 K/UL (ref 0–0.01)
NRBC BLD-RTO: 0 PER 100 WBC
PLATELET # BLD AUTO: 161 K/UL (ref 135–420)
PMV BLD AUTO: 10.5 FL (ref 9.2–11.8)
POTASSIUM SERPL-SCNC: 3.2 MMOL/L (ref 3.5–5.5)
RBC # BLD AUTO: 3.91 M/UL (ref 4.2–5.3)
SODIUM SERPL-SCNC: 146 MMOL/L (ref 136–145)
WBC # BLD AUTO: 2.9 K/UL (ref 4.6–13.2)

## 2024-05-11 PROCEDURE — 99284 EMERGENCY DEPT VISIT MOD MDM: CPT

## 2024-05-11 PROCEDURE — 80048 BASIC METABOLIC PNL TOTAL CA: CPT

## 2024-05-11 PROCEDURE — 85025 COMPLETE CBC W/AUTO DIFF WBC: CPT

## 2024-05-11 PROCEDURE — 82077 ASSAY SPEC XCP UR&BREATH IA: CPT

## 2024-05-11 PROCEDURE — 6370000000 HC RX 637 (ALT 250 FOR IP): Performed by: EMERGENCY MEDICINE

## 2024-05-11 PROCEDURE — 93005 ELECTROCARDIOGRAM TRACING: CPT

## 2024-05-11 RX ORDER — NALOXONE HYDROCHLORIDE 4 MG/.1ML
1 SPRAY NASAL PRN
Qty: 1 EACH | Refills: 0 | Status: SHIPPED | OUTPATIENT
Start: 2024-05-11

## 2024-05-11 RX ORDER — NALOXONE HYDROCHLORIDE 0.4 MG/ML
0.4 INJECTION, SOLUTION INTRAMUSCULAR; INTRAVENOUS; SUBCUTANEOUS
Status: DISCONTINUED | OUTPATIENT
Start: 2024-05-11 | End: 2024-05-11 | Stop reason: HOSPADM

## 2024-05-11 RX ORDER — POTASSIUM CHLORIDE 20 MEQ/1
40 TABLET, EXTENDED RELEASE ORAL ONCE
Status: DISCONTINUED | OUTPATIENT
Start: 2024-05-11 | End: 2024-05-11 | Stop reason: HOSPADM

## 2024-05-11 ASSESSMENT — PAIN - FUNCTIONAL ASSESSMENT: PAIN_FUNCTIONAL_ASSESSMENT: NONE - DENIES PAIN

## 2024-05-11 NOTE — ED PROVIDER NOTES
EMERGENCY DEPARTMENT HISTORY AND PHYSICAL EXAM      Date: 5/11/2024  Patient Name: Kati Ordaz    History of Presenting Illness     Chief Complaint   Patient presents with    Drug Overdose       History (Context): Kati Ordaz is a 58 y.o. female with a past medical history of hypertension and polysubstance abuse who presents via EMS after being found responsive in the street. EMS reports that the patient was initially unresponsive and police administered intranasal Narcan with good response.  Patient presents awake and alert with normal respirations. She endorses alcohol and heroin use tonight, but is not sure how much or at what time.      PCP: Miguel Joshi MD    Current Facility-Administered Medications   Medication Dose Route Frequency Provider Last Rate Last Admin    naloxone (NARCAN) injection 0.4 mg  0.4 mg IntraVENous NOW Hailey Samson DO         Current Outpatient Medications   Medication Sig Dispense Refill    amoxicillin-clavulanate (AUGMENTIN) 875-125 MG per tablet Take 1 tablet by mouth in the morning and 1 tablet in the evening. for 10 days.      amLODIPine (NORVASC) 5 MG tablet Take 0.5 tablets by mouth daily      aspirin 81 MG EC tablet Take 81 mg by mouth daily (Patient not taking: Reported on 6/14/2023)      atorvastatin (LIPITOR) 20 MG tablet Take 1 tablet by mouth      folic acid (FOLVITE) 1 MG tablet Take 1 tablet by mouth daily      methocarbamol (ROBAXIN) 500 MG tablet Take 1 tablet by mouth 3 times daily      naloxone 4 MG/0.1ML LIQD nasal spray Use 1 spray intranasally, then discard. Repeat with new spray every 2 min as needed for opioid overdose symptoms, alternating nostrils. (Patient not taking: Reported on 6/14/2023)      nitroGLYCERIN (NITROSTAT) 0.4 MG SL tablet Place 1 tablet under the tongue      ondansetron (ZOFRAN) 4 MG tablet Take 1 tablet by mouth every 8 hours as needed      thiamine 100 MG tablet Take 1 tablet by mouth daily         Past History     Past  patient demonstrated adequate understanding.  Patient was instructed to follow up with PCP, as well as given strict return precautions to the ED upon further deterioration. Patient is ready for discharge home.          Dragon Disclaimer     Please note that this dictation was completed with [employer]alar, the computer voice recognition software.  Quite often unanticipated grammatical, syntax, homophones, and other interpretive errors are inadvertently transcribed by the computer software.  Please disregard these errors.  Please excuse any errors that have escaped final proofreading.    Hailey Samson DO, PGY-2  [facility]

## 2024-05-11 NOTE — ED TRIAGE NOTES
Pt arrives via EMS for drug overdose. Per EMS, patient was found unresponsive in the middle of the street by a third party, called 911; police administered 4mg Narcan IN, once EMS arrived, patient was placed on stretcher and woke up. Pt alert and oriented upon arrival; denies pain; reports ETOH and drug use, reports used Heroin. Patient placed on cardiac monitor, PIV placed, blood obtained. Pt repeatedly asking for boyfriend.

## 2024-05-12 LAB
EKG ATRIAL RATE: 77 BPM
EKG DIAGNOSIS: NORMAL
EKG P AXIS: 73 DEGREES
EKG P-R INTERVAL: 216 MS
EKG Q-T INTERVAL: 452 MS
EKG QRS DURATION: 106 MS
EKG QTC CALCULATION (BAZETT): 511 MS
EKG R AXIS: 29 DEGREES
EKG T AXIS: 73 DEGREES
EKG VENTRICULAR RATE: 77 BPM

## 2024-05-12 PROCEDURE — 93010 ELECTROCARDIOGRAM REPORT: CPT | Performed by: INTERNAL MEDICINE

## 2024-06-20 ENCOUNTER — HOSPITAL ENCOUNTER (EMERGENCY)
Facility: HOSPITAL | Age: 59
Discharge: HOME | End: 2024-06-21
Attending: EMERGENCY MEDICINE
Payer: MEDICAID

## 2024-06-20 DIAGNOSIS — Z78.9 ALCOHOL USE: ICD-10-CM

## 2024-06-20 DIAGNOSIS — F14.10 COCAINE ABUSE (HCC): ICD-10-CM

## 2024-06-20 DIAGNOSIS — N30.00 ACUTE CYSTITIS WITHOUT HEMATURIA: ICD-10-CM

## 2024-06-20 DIAGNOSIS — T40.601A OPIATE OVERDOSE, ACCIDENTAL OR UNINTENTIONAL, INITIAL ENCOUNTER (HCC): Primary | ICD-10-CM

## 2024-06-20 PROCEDURE — 99284 EMERGENCY DEPT VISIT MOD MDM: CPT

## 2024-06-21 VITALS
OXYGEN SATURATION: 98 % | RESPIRATION RATE: 13 BRPM | DIASTOLIC BLOOD PRESSURE: 76 MMHG | TEMPERATURE: 97.8 F | SYSTOLIC BLOOD PRESSURE: 105 MMHG | HEART RATE: 81 BPM

## 2024-06-21 LAB
ALBUMIN SERPL-MCNC: 3.5 G/DL (ref 3.4–5)
ALBUMIN/GLOB SERPL: 1 (ref 0.8–1.7)
ALP SERPL-CCNC: 84 U/L (ref 45–117)
ALT SERPL-CCNC: 15 U/L (ref 13–56)
AMPHET UR QL SCN: NEGATIVE
ANION GAP SERPL CALC-SCNC: 7 MMOL/L (ref 3–18)
APPEARANCE UR: ABNORMAL
AST SERPL-CCNC: 16 U/L (ref 10–38)
BACTERIA URNS QL MICRO: ABNORMAL /HPF
BARBITURATES UR QL SCN: NEGATIVE
BASOPHILS # BLD: 0 K/UL (ref 0–0.1)
BASOPHILS NFR BLD: 0 % (ref 0–2)
BENZODIAZ UR QL: NEGATIVE
BILIRUB SERPL-MCNC: 0.3 MG/DL (ref 0.2–1)
BILIRUB UR QL: NEGATIVE
BUN SERPL-MCNC: 12 MG/DL (ref 7–18)
BUN/CREAT SERPL: 23 (ref 12–20)
CALCIUM SERPL-MCNC: 8.9 MG/DL (ref 8.5–10.1)
CANNABINOIDS UR QL SCN: NEGATIVE
CHLORIDE SERPL-SCNC: 108 MMOL/L (ref 100–111)
CO2 SERPL-SCNC: 27 MMOL/L (ref 21–32)
COCAINE UR QL SCN: POSITIVE
COLOR UR: YELLOW
CREAT SERPL-MCNC: 0.53 MG/DL (ref 0.6–1.3)
DIFFERENTIAL METHOD BLD: ABNORMAL
EOSINOPHIL # BLD: 0.1 K/UL (ref 0–0.4)
EOSINOPHIL NFR BLD: 2 % (ref 0–5)
EPITH CASTS URNS QL MICRO: ABNORMAL /LPF (ref 0–5)
ERYTHROCYTE [DISTWIDTH] IN BLOOD BY AUTOMATED COUNT: 12 % (ref 11.6–14.5)
ETHANOL SERPL-MCNC: 59 MG/DL (ref 0–3)
GLOBULIN SER CALC-MCNC: 3.5 G/DL (ref 2–4)
GLUCOSE SERPL-MCNC: 94 MG/DL (ref 74–99)
GLUCOSE UR STRIP.AUTO-MCNC: NEGATIVE MG/DL
HCT VFR BLD AUTO: 34.8 % (ref 35–45)
HGB BLD-MCNC: 11.9 G/DL (ref 12–16)
HGB UR QL STRIP: NEGATIVE
IMM GRANULOCYTES # BLD AUTO: 0 K/UL (ref 0–0.04)
IMM GRANULOCYTES NFR BLD AUTO: 0 % (ref 0–0.5)
KETONES UR QL STRIP.AUTO: NEGATIVE MG/DL
LEUKOCYTE ESTERASE UR QL STRIP.AUTO: ABNORMAL
LYMPHOCYTES # BLD: 1.1 K/UL (ref 0.9–3.6)
LYMPHOCYTES NFR BLD: 33 % (ref 21–52)
Lab: ABNORMAL
MCH RBC QN AUTO: 36.1 PG (ref 24–34)
MCHC RBC AUTO-ENTMCNC: 34.2 G/DL (ref 31–37)
MCV RBC AUTO: 105.5 FL (ref 78–100)
METHADONE UR QL: NEGATIVE
MONOCYTES # BLD: 0.2 K/UL (ref 0.05–1.2)
MONOCYTES NFR BLD: 6 % (ref 3–10)
NEUTS SEG # BLD: 2.1 K/UL (ref 1.8–8)
NEUTS SEG NFR BLD: 59 % (ref 40–73)
NITRITE UR QL STRIP.AUTO: NEGATIVE
NRBC # BLD: 0 K/UL (ref 0–0.01)
NRBC BLD-RTO: 0 PER 100 WBC
OPIATES UR QL: NEGATIVE
PCP UR QL: NEGATIVE
PH UR STRIP: 8.5 (ref 5–8)
PLATELET # BLD AUTO: 183 K/UL (ref 135–420)
PMV BLD AUTO: 9.1 FL (ref 9.2–11.8)
POTASSIUM SERPL-SCNC: 3.3 MMOL/L (ref 3.5–5.5)
PROT SERPL-MCNC: 7 G/DL (ref 6.4–8.2)
PROT UR STRIP-MCNC: 30 MG/DL
RBC # BLD AUTO: 3.3 M/UL (ref 4.2–5.3)
RBC #/AREA URNS HPF: NEGATIVE /HPF (ref 0–5)
SODIUM SERPL-SCNC: 142 MMOL/L (ref 136–145)
SP GR UR REFRACTOMETRY: 1.02 (ref 1–1.03)
TRI-PHOS CRY URNS QL MICRO: ABNORMAL
UROBILINOGEN UR QL STRIP.AUTO: 1 EU/DL (ref 0.2–1)
WBC # BLD AUTO: 3.5 K/UL (ref 4.6–13.2)
WBC URNS QL MICRO: ABNORMAL /HPF (ref 0–4)

## 2024-06-21 PROCEDURE — 6360000002 HC RX W HCPCS: Performed by: EMERGENCY MEDICINE

## 2024-06-21 PROCEDURE — 85025 COMPLETE CBC W/AUTO DIFF WBC: CPT

## 2024-06-21 PROCEDURE — 81001 URINALYSIS AUTO W/SCOPE: CPT

## 2024-06-21 PROCEDURE — 2580000003 HC RX 258: Performed by: EMERGENCY MEDICINE

## 2024-06-21 PROCEDURE — 80307 DRUG TEST PRSMV CHEM ANLYZR: CPT

## 2024-06-21 PROCEDURE — 87086 URINE CULTURE/COLONY COUNT: CPT

## 2024-06-21 PROCEDURE — 80053 COMPREHEN METABOLIC PANEL: CPT

## 2024-06-21 PROCEDURE — 82077 ASSAY SPEC XCP UR&BREATH IA: CPT

## 2024-06-21 PROCEDURE — 96374 THER/PROPH/DIAG INJ IV PUSH: CPT

## 2024-06-21 RX ORDER — CEPHALEXIN 500 MG/1
500 CAPSULE ORAL 4 TIMES DAILY
Qty: 28 CAPSULE | Refills: 0 | Status: SHIPPED | OUTPATIENT
Start: 2024-06-21 | End: 2024-06-28

## 2024-06-21 RX ORDER — NALOXONE HYDROCHLORIDE 4 MG/.1ML
1 SPRAY NASAL PRN
Qty: 2 EACH | Refills: 0 | Status: SHIPPED | OUTPATIENT
Start: 2024-06-21

## 2024-06-21 RX ADMIN — WATER 1000 MG: 1 INJECTION INTRAMUSCULAR; INTRAVENOUS; SUBCUTANEOUS at 05:18

## 2024-06-21 ASSESSMENT — PAIN SCALES - GENERAL: PAINLEVEL_OUTOF10: 0

## 2024-06-21 NOTE — ED NOTES
Patient discharged to home. IV removed. Discharge papers explained and given to patient. Patient ambulated to ER exit. [employer] cab called.

## 2024-06-21 NOTE — ED PROVIDER NOTES
Hematocrit 34.8 (L) 35.0 - 45.0 %    .5 (H) 78.0 - 100.0 FL    MCH 36.1 (H) 24.0 - 34.0 PG    MCHC 34.2 31.0 - 37.0 g/dL    RDW 12.0 11.6 - 14.5 %    Platelets 183 135 - 420 K/uL    MPV 9.1 (L) 9.2 - 11.8 FL    Nucleated RBCs 0.0 0  WBC    nRBC 0.00 0.00 - 0.01 K/uL    Neutrophils % 59 40 - 73 %    Lymphocytes % 33 21 - 52 %    Monocytes % 6 3 - 10 %    Eosinophils % 2 0 - 5 %    Basophils % 0 0 - 2 %    Immature Granulocytes % 0 0.0 - 0.5 %    Neutrophils Absolute 2.1 1.8 - 8.0 K/UL    Lymphocytes Absolute 1.1 0.9 - 3.6 K/UL    Monocytes Absolute 0.2 0.05 - 1.2 K/UL    Eosinophils Absolute 0.1 0.0 - 0.4 K/UL    Basophils Absolute 0.0 0.0 - 0.1 K/UL    Immature Granulocytes Absolute 0.0 0.00 - 0.04 K/UL    Differential Type AUTOMATED     Comprehensive Metabolic Panel    Collection Time: 06/21/24 12:15 AM   Result Value Ref Range    Sodium 142 136 - 145 mmol/L    Potassium 3.3 (L) 3.5 - 5.5 mmol/L    Chloride 108 100 - 111 mmol/L    CO2 27 21 - 32 mmol/L    Anion Gap 7 3.0 - 18 mmol/L    Glucose 94 74 - 99 mg/dL    BUN 12 7.0 - 18 MG/DL    Creatinine 0.53 (L) 0.6 - 1.3 MG/DL    BUN/Creatinine Ratio 23 (H) 12 - 20      Est, Glom Filt Rate >90 >60 ml/min/1.73m2    Calcium 8.9 8.5 - 10.1 MG/DL    Total Bilirubin 0.3 0.2 - 1.0 MG/DL    ALT 15 13 - 56 U/L    AST 16 10 - 38 U/L    Alk Phosphatase 84 45 - 117 U/L    Total Protein 7.0 6.4 - 8.2 g/dL    Albumin 3.5 3.4 - 5.0 g/dL    Globulin 3.5 2.0 - 4.0 g/dL    Albumin/Globulin Ratio 1.0 0.8 - 1.7     ETOH    Collection Time: 06/21/24 12:15 AM   Result Value Ref Range    Ethanol Lvl 59 (H) 0 - 3 MG/DL   Urine Drug Screen    Collection Time: 06/21/24  2:55 AM   Result Value Ref Range    Benzodiazepines, Urine Negative NEG      Barbiturates, Urine Negative NEG      THC, TH-Cannabinol, Urine Negative NEG      Opiates, Urine Negative NEG      Phencyclidine, Urine Negative NEG      Cocaine, Urine Positive (A) NEG      Amphetamine, Urine Negative NEG      Methadone,

## 2024-06-21 NOTE — ED TRIAGE NOTES
Patient brought in by medics after she passed out on the porch of a acquaintance.  Medics were told that patient has a  history of drug abuse.  Patient was noted to not have effective respirations so her respirations were supported by WILMA bean for about 10 min patient did not respond to intranasal narcan so an IV was placed and IV narcan was given patient responded to this and arrived awake alert and oriented X 4.

## 2024-06-21 NOTE — ED NOTES
Patient requested that this nurse call [phone] and tell Rayo she is at [facility]. Rayo is aware.

## 2024-06-23 LAB
BACTERIA SPEC CULT: ABNORMAL
BACTERIA SPEC CULT: ABNORMAL
CC UR VC: ABNORMAL
SERVICE CMNT-IMP: ABNORMAL

## 2024-06-26 LAB
BACTERIA SPEC CULT: ABNORMAL
CC UR VC: ABNORMAL
SERVICE CMNT-IMP: ABNORMAL

## 2024-07-09 ENCOUNTER — HOSPITAL ENCOUNTER (EMERGENCY)
Facility: HOSPITAL | Age: 59
Discharge: HOME | End: 2024-07-10
Attending: EMERGENCY MEDICINE
Payer: MEDICAID

## 2024-07-09 DIAGNOSIS — T50.901A ACCIDENTAL OVERDOSE, INITIAL ENCOUNTER: Primary | ICD-10-CM

## 2024-07-09 PROCEDURE — 99283 EMERGENCY DEPT VISIT LOW MDM: CPT

## 2024-07-10 VITALS
DIASTOLIC BLOOD PRESSURE: 87 MMHG | SYSTOLIC BLOOD PRESSURE: 121 MMHG | RESPIRATION RATE: 14 BRPM | TEMPERATURE: 97.8 F | HEART RATE: 73 BPM | OXYGEN SATURATION: 98 %

## 2024-07-10 ASSESSMENT — ENCOUNTER SYMPTOMS
RESPIRATORY NEGATIVE: 1
GASTROINTESTINAL NEGATIVE: 1

## 2024-07-10 NOTE — ED PROVIDER NOTES
Breath sounds: Normal breath sounds. No stridor. No wheezing, rhonchi or rales.   Chest:      Chest wall: No tenderness.   Abdominal:      Palpations: Abdomen is soft.      Tenderness: There is no abdominal tenderness.   Musculoskeletal:         General: No swelling, tenderness, deformity or signs of injury.      Cervical back: Neck supple.      Right lower leg: No edema.      Left lower leg: No edema.   Skin:     General: Skin is warm.      Capillary Refill: Capillary refill takes less than 2 seconds.      Coloration: Skin is not jaundiced or pale.      Findings: No bruising, erythema, lesion or rash.   Neurological:      Mental Status: She is alert and oriented to person, place, and time.      Cranial Nerves: No cranial nerve deficit.      Motor: No weakness.      Gait: Gait normal.         DIAGNOSTIC RESULTS     EKG: All EKG's are interpreted by the Emergency Department Physician who either signs or Co-signs this chart in the absence of a cardiologist.        RADIOLOGY:   Non-plain film images such as CT, Ultrasound and MRI are read by the radiologist. Plain radiographic images are visualized and preliminarily interpreted by the emergency physician with the below findings:        Interpretation per the Radiologist below, if available at the time of this note:    No orders to display         ED BEDSIDE ULTRASOUND:   Performed by ED Physician - none    LABS:  Labs Reviewed - No data to display    All other labs were within normal range or not returned as of this dictation.    EMERGENCY DEPARTMENT COURSE and DIFFERENTIAL DIAGNOSIS/MDM:   Vitals:    Vitals:    07/09/24 2216 07/10/24 0209   BP: (!) 143/107 121/87   Pulse: 80 73   Resp: 13 14   Temp: 97.3 °F (36.3 °C) 97.8 °F (36.6 °C)   TempSrc: Oral    SpO2: 99% 98%           Medical Decision Making  58-year-old female presents emergency department with opioid overdose.  I am seeing is opioid overdose because she responded to Narcan was very sleepy when she came in

## 2024-09-02 ENCOUNTER — HOSPITAL ENCOUNTER (EMERGENCY)
Facility: HOSPITAL | Age: 59
Discharge: HOME | End: 2024-09-02
Attending: STUDENT IN AN ORGANIZED HEALTH CARE EDUCATION/TRAINING PROGRAM
Payer: MEDICAID

## 2024-09-02 VITALS
OXYGEN SATURATION: 99 % | RESPIRATION RATE: 20 BRPM | DIASTOLIC BLOOD PRESSURE: 92 MMHG | SYSTOLIC BLOOD PRESSURE: 121 MMHG | HEART RATE: 89 BPM | TEMPERATURE: 97.7 F

## 2024-09-02 DIAGNOSIS — T40.601A OPIATE OVERDOSE, ACCIDENTAL OR UNINTENTIONAL, INITIAL ENCOUNTER (HCC): Primary | ICD-10-CM

## 2024-09-02 PROCEDURE — 99283 EMERGENCY DEPT VISIT LOW MDM: CPT

## 2024-09-02 ASSESSMENT — ENCOUNTER SYMPTOMS
VOMITING: 0
SORE THROAT: 0
ABDOMINAL PAIN: 0
DIARRHEA: 0
SHORTNESS OF BREATH: 0

## 2024-09-02 ASSESSMENT — PAIN - FUNCTIONAL ASSESSMENT: PAIN_FUNCTIONAL_ASSESSMENT: NONE - DENIES PAIN

## 2024-09-02 NOTE — ED TRIAGE NOTES
Pt in ED from home via EMS for heroin/fentanyl overdose. Pt was found in back yard by boyfriend not breathing and unresponsive. EMS administered 6mg narcan IM and bagged pt. EMS report pt being \"gray in color, apneic and had a weak pulse\" on scene. Pt has hx of HTN. Pt now alert and oriented x 4. Pts nasopharyngeal airway removed. Pt does not appear to be in any distress and given phone to make a call per request.

## 2024-09-03 NOTE — ED PROVIDER NOTES
[facility] EMERGENCY DEPT  EMERGENCY DEPARTMENT HISTORY AND PHYSICAL EXAM      Date: 9/2/2024  Patient Name: Kati Ordaz  MRN: [id number]  YOB: 1965  Date of evaluation: 9/2/2024  Provider: SMITHA Vo NP   Note Started: 8:20 PM EDT 9/2/24      8750 Patient signed out to me by Desiree WOLFE PA-C due to end of shift.  Please see their note for HPI, PE, and initial work-up.    BP (!) 121/92   Pulse 89   Temp 97.7 °F (36.5 °C) (Oral)   Resp 20   SpO2 99%       Discharge Note:  The patient has been re-evaluated and is ready for discharge. Reviewed available results with patient. Counseled patient on diagnosis and care plan. Patient has expressed understanding, and all questions have been answered. Patient agrees with plan and agrees to follow up as recommended, or to return to the ED if their symptoms worsen. Discharge instructions have been provided and explained to the patient, along with reasons to return to the ED.        Diagnosis     Clinical Impression:   1. Opiate overdose, accidental or unintentional, initial encounter ([facility])        Disposition: home      Miguel Joshi MD  [address] [place] [id number] [phone]    Schedule an appointment as soon as possible for a visit in 1 day      [facility] EMERGENCY DEPT  [address] [place] [id number] [phone]    As needed, If symptoms worsen          Medication List        ASK your doctor about these medications      amLODIPine 5 MG tablet  Commonly known as: NORVASC     amoxicillin-clavulanate 875-125 MG per tablet  Commonly known as: AUGMENTIN     aspirin 81 MG EC tablet     atorvastatin 20 MG tablet  Commonly known as: LIPITOR     folic acid 1 MG tablet  Commonly known as: FOLVITE     methocarbamol 500 MG tablet  Commonly known as: ROBAXIN     * naloxone 4 MG/0.1ML Liqd nasal spray     * naloxone 4 MG/0.1ML Liqd nasal spray  1 spray by Nasal route as needed for Opioid Reversal     * naloxone 4 MG/0.1ML Liqd nasal 
Take 1 tablet by mouth every 8 hours as needed      thiamine 100 MG tablet Take 1 tablet by mouth daily         Past History     Past Medical History:   Past Medical History:   Diagnosis Date    Hypertension        Past Surgical History:  History reviewed. No pertinent surgical history.    Family History:  History reviewed. No pertinent family history.    Social History:   Social History     Tobacco Use    Smoking status: Never    Smokeless tobacco: Never   Substance Use Topics    Alcohol use: Yes    Drug use: Yes     Types: Opiates        Allergies:  No Known Allergies    PMH, PSH, family history, social history, allergies reviewed with the patient with significant items noted above.  Review of Systems   Review of Systems   Constitutional:  Negative for activity change and fatigue.   HENT:  Negative for congestion and sore throat.    Respiratory:  Negative for shortness of breath.    Cardiovascular:  Negative for chest pain.   Gastrointestinal:  Negative for abdominal pain, diarrhea and vomiting.   Genitourinary:  Negative for dysuria and vaginal discharge.   Musculoskeletal:  Negative for arthralgias and myalgias.   Skin:  Negative for wound.   Neurological:  Negative for dizziness, syncope and headaches.   All other systems reviewed and are negative.      Physical Exam     Vitals:    09/02/24 1723 09/02/24 1730 09/02/24 1733   BP: (!) 132/90 113/80    Pulse: 85 84 85   Resp: 21 18    Temp: 97.7 °F (36.5 °C)     TempSrc: Oral     SpO2: 100% 100%        Physical Exam  Vitals and nursing note reviewed.   Constitutional:       Appearance: Normal appearance.      Comments: Pt in NAD   HENT:      Head: Normocephalic and atraumatic.   Eyes:      General: No scleral icterus.     Comments: PERRL  EOM intact   Cardiovascular:      Rate and Rhythm: Normal rate and regular rhythm.      Pulses: No decreased pulses.   Pulmonary:      Effort: Pulmonary effort is normal.      Breath sounds: Normal breath sounds and air entry.